# Patient Record
Sex: FEMALE | Race: WHITE | NOT HISPANIC OR LATINO | Employment: UNEMPLOYED | URBAN - METROPOLITAN AREA
[De-identification: names, ages, dates, MRNs, and addresses within clinical notes are randomized per-mention and may not be internally consistent; named-entity substitution may affect disease eponyms.]

---

## 2023-01-10 ENCOUNTER — TRANSFERRED RECORDS (OUTPATIENT)
Dept: HEALTH INFORMATION MANAGEMENT | Facility: CLINIC | Age: 53
End: 2023-01-10
Payer: COMMERCIAL

## 2023-01-20 ENCOUNTER — PATIENT OUTREACH (OUTPATIENT)
Dept: ONCOLOGY | Facility: CLINIC | Age: 53
End: 2023-01-20
Payer: COMMERCIAL

## 2023-01-20 ENCOUNTER — TRANSCRIBE ORDERS (OUTPATIENT)
Dept: OTHER | Age: 53
End: 2023-01-20

## 2023-01-20 DIAGNOSIS — C50.919 INVASIVE DUCTAL CARCINOMA OF BREAST (H): Primary | ICD-10-CM

## 2023-01-23 ENCOUNTER — PRE VISIT (OUTPATIENT)
Dept: ONCOLOGY | Facility: CLINIC | Age: 53
End: 2023-01-23
Payer: COMMERCIAL

## 2023-01-23 NOTE — PROGRESS NOTES
New Patient Oncology Nurse Navigator Note     Referring provider: Self     Referred to (specialty:) Medical Oncology and Cancer Surgery     Requested provider (if applicable): Dr. Marc Judd or Dr. Maria G Mckeon     Date Referral Received: January 23, 2023     Evaluation for:  Breast cancer     Clinical History (per Nurse review of records provided):       Patient had bilateral screening mammograms on 12/27/22 and indeterminate grouped versus clustered fine microcalcifications at the 2:00 position of the left breast, middle depth. Recommend correlation with a mediolateral view and spot magnification CC and MLO views of the left breast for further evaluation. Diagnostic imaging followed on 12/30 and clustered microcalcifications in the upper outer aspect of the left breast are suspicious of malignancy. Recommend stereotactically guided biopsy.     1/10/23 - Left breast, stereotactic needle core biopsy at 2:00 at [facility]   Invasive ductal carcinoma, only microinvasion present (not graded) , DCIS, Grade 3, ER+ (weak) in DCIS, GA not performed, HER2 not performed    1/19/23 - She did meet with Karley Garcia -  CONSULT NOTE NEEDED    Genetic counseling consult scheduled for 1/25 at [facility] - with Trang Chacko MS, [facility]  GENETIC TESTING RESULTS NEEDED WHEN AVAILABLE    Records Location: Care Everywhere, Media and See Bookmarked material     Records Needed:   Breast imaging for past 5 years  Pathology report from 1/10/23  Consult notes from Dr. Karley Garcia 1/19/23  Genetic counseling notes (scheduled for 1/25/23)     Additional testing needed prior to consult:   Possible breast MRI ordered at [facility]    Telephoned and spoke with Philippe to assist in scheduling consult. Both of her parents are nurses and they recommended Dr. Judd or Dr. Mckeon.  Philippe had appropriate questions regarding sequence of events in early stage cancer workup. We were in process of discussing  these when she received another call from [facility] (schedulers) and we agreed to discuss when she called back. Writer did leave voice message with my number inviting calls.

## 2023-01-23 NOTE — PROGRESS NOTES
MEDICAL ONCOLOGY NEW PATIENT NOTE    Philippe Tom  Female, 52 year old, 1970  MRN:   [id number]    January 26, 2023     Dear Dr. Diggs,    Thank you for referring Philippe Tom regarding a newly diagnosed DCIS with a microinvasive component.    PROBLEM: New diagnosis of left sided DCIS with invasive component    HISTORY OF PRESENT ILLNESS: Ms. Tom is a 52 year old woman from [place], [place] with a new diagnosis of left breast Nuclear Grade 3 ER+(15-20%) DCIS with less than 1 mm of invasive disease as part of workup following routine screening mammogram that detected calcifications. She has not undergone surgical management as of yet, and is establishing care at the [facility] with Medical Oncology, and will be seeing Dr. Noamn Diggs for surgical consultation on 1/27/2023.     Her oncologic diagnosis came to light after routine screening mammogram on 12/27/2022 demonstrated indeterminate grouped versus clustered fine microcalcifcations at the 2 o'clock position of the left breast at mid depth. Of note, she does have a history of previous breast biopsies in the right breast on 12/10/2015 that detected fibroadenomas at the 8 o'clock position, 5 cm from the nipple measuring 11 x 6 x 10 mm, and at the 10 o'clock position, 7 cm from the nipple measuring 8 x 4 x 7 mm. Within the 8 o'clock position, rare calcifications were noted adjacent to the fibroadenoma. She also had a diagnostic mammogram for a palpable lump in the left breast on 5/21/2019 at the 2 o'clock position. Focused left breast ultrasound demonstrated an anechoic cyst at the 2 o'clock psition, 8 cm from the nipple measuring 1.7 x 1.2 x 1.5 cm, noted to be benign. An additional anechoic cyst was noted at 1:30 position, 7 cm from the nipple measurign up to 8 mm and routine yearly mammography was recommended.    She underwent stereotactic guided biopsy at [facility] on 1/10/2023 with pathology (Specimen ID  [id number]) demonstrating Nuclear Grade 3 ER+ (11-20%, weak) DCIS with central comedonecrosis, with less than 1 mm of invasive disease noted within the specimen.     She was seen by Dr. Kelly Garcia for surgical consultation on 1/19/2023 who discussed surgical options with the patient. Note unavailable in care everywhere    She saw Medical Genetics at [facility] on 1/25/2023, seeing Sasha Chacko. [facility]'s STAT breast panel was sent.     SUMMARY OF OUTSIDE STUDIES:  Patient had bilateral screening mammograms on 12/27/22 and indeterminate grouped versus clustered fine microcalcifications at the 2:00 position of the left breast, middle depth. Recommend correlation with a mediolateral view and spot magnification CC and MLO views of the left breast for further evaluation. Diagnostic imaging followed on 12/30 and clustered microcalcifications in the upper outer aspect of the left breast are suspicious of malignancy. Recommend stereotactically guided biopsy.     1/10/23 - Left breast, stereotactic needle core biopsy at 2:00:  Biopsy at [facility] - pathology report needed.   Left breast, needle core biopsy at 2:00: Ductal carcinoma in situ with   calcifications and at least microinvasion.    Patient states this was left invasive ductal carcinoma,  ER+    <1mm of invasive -- micro  1/19/23 - She did meet with Karley Garcia - consult note needed  Genetic counseling consult scheduled for 1/25 at [facility] - consult note needed.    RECENT HISTORY:  Ms. Tom is seen today with her , Esau Medellin, and her sister. She notes that she has been feeling some twinges in the left breast, intermittent, and self-remitting. She describes this as similar to the sensation of milk letting down. She notes anxiety and depression, for which she is seeing a counselor. She notes ongoing fatigue. She is working full time as a  at [employer]. She denies pain, weight  loss, numbness, tingling or weakness in the arms or legs. Regarding surgical management, the patient states that she has not made a decision regarding lumpectomy versus mastectomy yet, as she is hopeful to have genetics prior to making this decision.    Breast Cancer Risk Factors:   Breast biopsies: 2 breast biopsies on the right breast that demonstrated fibroadenomas  OCPs: duration of 10 years in college  Menarche: around 7th grade, estimated at 15 years old  Gestational history: 3 pregnancies, 2 live births at 30 years and 32 years, 1  at 20 years  Currently using Mirena IUD for significant bleeding from fibroids. Estimated LMP is 2023  No first degree relatives with breast or ovarian cancer.  She does have a family history of a maternal great aunt and maternal first cousin with breast cancer.    Menstrual history: Menarche age 15.  First pregnancy age 20.  Ended in .  Pregnancies age 30 and 32 with live births.  She has fibroids has a Mirena IUD with light periods.  She did have a history of heavy menstrual bleeding related to the fibroids, before the Mirena IUD was placed.  No history of hormone replacement therapy.    PMH:   Attention deficit disorder  Allergies  Migraines  Fibroids  The patient does not have a history of low bone density, diabetes mellitus, hyperlipidemia, or hypertension    She has no history of breast surgery, breast cancer in the past, radiation therapy in the past, radiation exposure in the past, tumor of any kind, heart problems, heart attack, breathing problems, blood clots, seizures, arthritis, peptic ulcer disease, osteoporosis, bone fractures.  She is not currently participating in a clinical trial.    PSH:  Intrauterine device insertion    MEDICATIONS:  Cetirizine  Cyclobenzaprine  Fremanezumab-vfrm (Ajovy)  Methylphenidate  Sumatriptan  Tretinoin  Adderall  Rimegepant  Epinephrine 0.3 mg/0.3 mL    ALLERGY:  Bee  "pollen  Walnuts  Doxycycline  Sulfa  Wasps       Dog hair  - She follows with Dr. Rufus Cardenas at [facility]    FAMILY HISTORY:  Mother with skin cancer on the leg at 65 years  Father with prostate cancer at 62 years old, currently alive, no recurrence [id number]. Menard 3+4.  Paternal grandfather with prostate cancer, passed at 78 years  Maternal aunt with breast cancer at 70 years old  Maternal first cousin with breast cancer  Maternal grandfather with non-Hodgkin's lymphoma at 82 years  Maternal uncle with colon cancer at 70 years  No male relatives with breast cancer.      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 The last noted menstrual cycle completed on January 22, 2023.     REVIEW OF SYMPTOMS: She has no breast complaints and specifically no breast lumps, skin changes nipple changes or dimpling.  She denies fevers or chills, cough, chest pain, shortness of breath, nausea, vomiting, diarrhea, bone pain, back pain, headache, numbness or tingling in the hands or feet, hearing loss, or depression.  A full 10-point review of systems was performed as per nursing note.  Pertinent negatives and positives reviewed.       PHYSICAL EXAMINATION:    /74 (BP Location: Right arm, Patient Position: Sitting, Cuff Size: Adult Regular)   Pulse 84   Temp 98.8  F (37.1  C) (Oral)   Ht 1.65 m (5' 4.96\")   Wt 74.3 kg (163 lb 14.4 oz)   SpO2 100%   BMI 27.31 kg/m    Gen: Alert, " well-appearing 52 year old woman in no acute distress  Eyes: EOMI, sclera anicteric  HENT      Head: NC/AT     Ears: No external auricular lesions     Nose/sinus: No rhinorrhea or epistaxis     Oral Cavity/Oropharynx: MMM, no thrush noted  Pulm: Breathing comfortably on room air, no audible wheezes or ronchi  CV: Well-perfused, no cyanosis  Abdominal: Soft, nondistended  Skin: Normal color and turgor  Neurologic/MSK: motor grossly intact, normal gait  Lymph: No supraclavicular, infraclavicular, or axillary adenopathy noted bilaterally  Breast: Both breasts are examined in the seated and supine positions. The breasts and nipples appear everted and symmetric. There are no palpable masses noted in the bilateral breasts. No cervical, supraclavicular, subclavicular, or axillary adenopathy noted bilaterally.  Left breast biopsy site in left breast well healed.   Psych: Appropriate mood and affect    ASSESSMENT AND PLAN:   1.  Philippe Tom is a 52 year old pre-menopausal woman with a recent diagnosis of a left breast  nuclear grade 3 DCIS with a microinvasive component < 1 mm diagnosed from a stereotactic biopsy after routine screening mammogram revealed calcifications. ECOG 0. She has not undergone surgical management, and will meet with Dr. Diggs tomorrow to discuss surgical options. Given that we do not have the full pathology, as will be delineated after surgical management, we discussed in general what options would be. We also discussed OncotypeDx would only be sent if her invasive component of disease was 5 mm or greater.   2.  Grade 3 DCIS with invasive component  - Awaiting surgical management. She meets with Dr. Noman Diggs on 1/27/2023. We will plan on seeing the patient after she has had surgery regarding management. Given her age, she will likely require adjuvant radiation therapy and endocrine therapy with tamoxifen for 5 years. Genetics panel is pending.   - The patient asked many good questions regarding  possible management strategies; these are all dependent on her final pathology.  Other calcifications remain to be biopsied. Contrast mammogram may be helpful.  - We will plan on her returning to clinic following completion of staging and pathology review.  - Review in Multidisciplinary Breast Conference tomorrow, 1/27/2023  3. Menorrhagia secondary to fibroids  - Currently using a Mirena IUD for menorrhagia  We do recommend discontinuation of the Mirena IUD in consultation with her gynecologist because of the association between levonorgestrel and breast cancer risk in epidemiological studies.   4.  Recommendations of exercise of 150 minutes/week based on the lace and pathway study.  5. Recommendation of a Mediterranean style diet low in saturated fat but not low in fat with more fruits and vegetables and less red meat.   6.  Recommendation for bone health includes vitamin D3 2000 international units daily and calcium 1 g/day either by diet or reading labels.  We also recommend bone strengthening exercises including stretch band hand weights and yoga.  7. Fatigue / Anxiety  - She currently does have a counselor. Not currently using medication towards this.  8. COVID-19 vaccination  - She has had her last (4th) booster in November 2022.   10.  Migraines, associated with menses  - Using sumatriptan  11.  Follow up with me in 2 weeks to go over the results of imaging and pathology.     Thank you for allowing us to participate in this patient's care.  The patient was seen and evaluated by me.  I discussed the patient with the resident and agree with the findings and plan in the note, which was edited by me.    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 Judd, who agrees with the above assessment and plan.      Tigist Rolon MD  PGY4  Department of Radiation Oncology  [phone] Clinic  [phone] Pager    ADDENDUM: Discussion in breast conference resulted in recommendation of a contrast mammogram, a stereo tactic biopsy of additional calcifications which have not been biopsied and consultation with Dr. Noman Diggs.     ADDENDUM2:  I called on 2-5-23 and recommended that she call us about removal of the Mirena. Pathology is pending from 1-30-23 biopsy.      ADDENDUM3:  NF1 mosaic.  We would be interested in the variant allele frequency, which is something the patient's genetic counselor would be able to get from [employer].    I spent 60 minutes with the patient more than 50% of which was in counseling and coordination of care.

## 2023-01-23 NOTE — TELEPHONE ENCOUNTER
RECORDS STATUS - BREAST    RECORDS REQUESTED FROM: [facility]   DATE REQUESTED:    NOTES DETAILS STATUS   OFFICE NOTE from referring provider     OFFICE NOTE from medical oncologist     OFFICE NOTE from surgeon     OFFICE NOTE from radiation oncologist     DISCHARGE SUMMARY from hospital     DISCHARGE REPORT from the ER     OPERATIVE REPORT     MEDICATION LIST     CLINICAL TRIAL TREATMENTS TO DATE     LABS     PATHOLOGY REPORTS  (Tissue diagnosis, Stage, ER/NJ percentage positive and intensity of staining, HER2 IHC, FISH, and all biopsies from breast and any distant metastasis)                 Lab [facility], report received , slides requested  [employer] 1/10/23:    GENONOMIC TESTING     TYPE:   (Next Generation Sequencing, including Foundation One testing, and Oncotype score)     IMAGING (NEED IMAGES & REPORT)     CT SCANS     MRI     [facility]  1/10/23, 22    [facility]  22, 21      19, 12/10/15, 14, 13, 11   ULTRASOUND PACS   19, 12/17/15, 12/16/15, 13, 11   PET     BONE SCAN     BRAIN MRI

## 2023-01-25 ENCOUNTER — TRANSFERRED RECORDS (OUTPATIENT)
Dept: HEALTH INFORMATION MANAGEMENT | Facility: CLINIC | Age: 53
End: 2023-01-25

## 2023-01-26 ENCOUNTER — ONCOLOGY VISIT (OUTPATIENT)
Dept: ONCOLOGY | Facility: CLINIC | Age: 53
End: 2023-01-26
Attending: INTERNAL MEDICINE
Payer: COMMERCIAL

## 2023-01-26 VITALS
BODY MASS INDEX: 27.31 KG/M2 | WEIGHT: 163.9 LBS | OXYGEN SATURATION: 100 % | HEART RATE: 84 BPM | SYSTOLIC BLOOD PRESSURE: 130 MMHG | HEIGHT: 65 IN | TEMPERATURE: 98.8 F | DIASTOLIC BLOOD PRESSURE: 74 MMHG

## 2023-01-26 DIAGNOSIS — Z17.0 MALIGNANT NEOPLASM OF UPPER-OUTER QUADRANT OF LEFT BREAST IN FEMALE, ESTROGEN RECEPTOR POSITIVE (H): Primary | ICD-10-CM

## 2023-01-26 DIAGNOSIS — C50.412 MALIGNANT NEOPLASM OF UPPER-OUTER QUADRANT OF LEFT BREAST IN FEMALE, ESTROGEN RECEPTOR POSITIVE (H): Primary | ICD-10-CM

## 2023-01-26 DIAGNOSIS — C50.919 INVASIVE DUCTAL CARCINOMA OF BREAST (H): ICD-10-CM

## 2023-01-26 PROCEDURE — G0463 HOSPITAL OUTPT CLINIC VISIT: HCPCS

## 2023-01-26 PROCEDURE — 99205 OFFICE O/P NEW HI 60 MIN: CPT | Performed by: INTERNAL MEDICINE

## 2023-01-26 PROCEDURE — G0463 HOSPITAL OUTPT CLINIC VISIT: HCPCS | Performed by: INTERNAL MEDICINE

## 2023-01-26 RX ORDER — AMMONIUM LACTATE 12 G/100G
1 CREAM TOPICAL
COMMUNITY

## 2023-01-26 RX ORDER — ZOLMITRIPTAN 5 MG/1
5 TABLET, ORALLY DISINTEGRATING ORAL PRN
COMMUNITY
Start: 2023-01-20

## 2023-01-26 RX ORDER — ACETAMINOPHEN 325 MG/1
TABLET, COATED ORAL
COMMUNITY
Start: 2022-08-03 | End: 2023-01-27

## 2023-01-26 RX ORDER — METHYLPHENIDATE HYDROCHLORIDE 36 MG/1
36 TABLET ORAL
COMMUNITY
Start: 2022-12-07 | End: 2023-01-27

## 2023-01-26 RX ORDER — SUMATRIPTAN 20 MG/1
1 SPRAY NASAL PRN
COMMUNITY
Start: 2022-10-23

## 2023-01-26 RX ORDER — CYCLOBENZAPRINE HCL 10 MG
1 TABLET ORAL EVERY EVENING
COMMUNITY
Start: 2022-10-21

## 2023-01-26 RX ORDER — FREMANEZUMAB-VFRM 225 MG/1.5ML
INJECTION SUBCUTANEOUS
COMMUNITY
Start: 2022-10-16 | End: 2023-11-07

## 2023-01-26 RX ORDER — TRETINOIN 0.25 MG/G
CREAM TOPICAL AT BEDTIME
COMMUNITY
Start: 2021-08-21

## 2023-01-26 RX ORDER — SUMATRIPTAN 100 MG/1
2 TABLET, FILM COATED ORAL
COMMUNITY
Start: 2022-10-13

## 2023-01-26 RX ORDER — METHYLPHENIDATE HYDROCHLORIDE 20 MG/1
20 TABLET ORAL EVERY MORNING
COMMUNITY
Start: 2021-11-10

## 2023-01-26 RX ORDER — CETIRIZINE HYDROCHLORIDE 10 MG/1
1 TABLET ORAL EVERY EVENING
COMMUNITY
Start: 2022-08-03 | End: 2023-03-23

## 2023-01-26 ASSESSMENT — PAIN SCALES - GENERAL: PAINLEVEL: NO PAIN (0)

## 2023-01-26 NOTE — NURSING NOTE
"Oncology Rooming Note    January 26, 2023 10:14 AM   Philippe Tom is a 52 year old female who presents for:    Chief Complaint   Patient presents with     Oncology Clinic Visit     Malignant neoplasm of upper-outer quadrant of left breast      Initial Vitals: /74 (BP Location: Right arm, Patient Position: Sitting, Cuff Size: Adult Regular)   Pulse 84   Temp 98.8  F (37.1  C) (Oral)   Ht 1.65 m (5' 4.96\")   Wt 74.3 kg (163 lb 14.4 oz)   SpO2 100%   BMI 27.31 kg/m   Estimated body mass index is 27.31 kg/m  as calculated from the following:    Height as of this encounter: 1.65 m (5' 4.96\").    Weight as of this encounter: 74.3 kg (163 lb 14.4 oz). Body surface area is 1.85 meters squared.  No Pain (0) Comment: Data Unavailable   No LMP recorded.  Allergies reviewed: Yes  Medications reviewed: Yes    Medications: Medication refills not needed today.  Pharmacy name entered into [facility]:    [id number] [place] [address]  [facility] [place]    Clinical concerns: none.       Maurilio Rivera            "

## 2023-01-26 NOTE — LETTER
1/26/2023         RE: Philippe Tom  [address]  [facility] [address]        Dear Colleague,    Thank you for referring your patient, Philippe Tom, to the [facility]. Please see a copy of my visit note below.    MEDICAL ONCOLOGY NEW PATIENT NOTE    Philippe Tom  Female, 52 year old, 1970  MRN:   [id number]    January 26, 2023     Dear Dr. Diggs,    Thank you for referring Philippe Tom regarding a newly diagnosed DCIS with a microinvasive component.    PROBLEM: New diagnosis of left sided DCIS with invasive component    HISTORY OF PRESENT ILLNESS: Ms. Tom is a 52 year old woman from [place], [place] with a new diagnosis of Nuclear Grade 3 ER+(15-20%) DCIS with less than 1 mm of invasive disease as part of workup following routine screening mammogram that detected calcifications. She has not undergone surgical management as of yet, and is establishing care at the [facility] with Medical Oncology, and will be seeing Dr. Noman Diggs for surgical consultation on 1/27/2023.     Her oncologic diagnosis came to light after routine screening mammogram on 12/27/2022 demonstrated indeterminate grouped versus clustered fine microcalcifcations at the 2 o'clock position of the left breast at mid depth. Of note, she does have a history of previous breast biopsies in the right breast on 12/10/2015 that detected fibroadenomas at the 8 o'clock position, 5 cm from the nipple measuring 11 x 6 x 10 mm, and at the 10 o'clock position, 7 cm from the nipple measuring 8 x 4 x 7 mm. Within the 8 o'clock position, rare calcifications were noted adjacent to the fibroadenoma. She also had a diagnostic mammogram for a palpable lump in the left breast on 5/21/2019 at the 2 o'clock position. Focused left breast ultrasound demonstrated an anechoic cyst at the 2 o'clock psition, 8 cm from the nipple measuring 1.7 x 1.2 x 1.5 cm, noted to be benign. An additional anechoic  cyst was noted at 1:30 position, 7 cm from the nipple measurign up to 8 mm and routine yearly mammography was recommended.    She underwent stereotactic guided biopsy at [facility] on 1/10/2023 with pathology (Specimen -[id number]) demonstrating Nuclear Grade 3 ER+ (11-20%, weak) DCIS with central comedonecrosis, with less than 1 mm of invasive disease noted within the specimen.     She was seen by Dr. Kelly Garcia for surgical consultation on 1/19/2023 who discussed surgical options with the patient. Note unavailable in care everywhere    She saw Medical Genetics at [facility] on 1/25/2023, seeing Sasha Chacko. [email or web address]'s STAT breast panel was sent.     SUMMARY OF OUTSIDE STUDIES:  Patient had bilateral screening mammograms on 12/27/22 and indeterminate grouped versus clustered fine microcalcifications at the 2:00 position of the left breast, middle depth. Recommend correlation with a mediolateral view and spot magnification CC and MLO views of the left breast for further evaluation. Diagnostic imaging followed on 12/30 and clustered microcalcifications in the upper outer aspect of the left breast are suspicious of malignancy. Recommend stereotactically guided biopsy.     1/10/23 - Left breast, stereotactic needle core biopsy at 2:00:  Biopsy at [facility] - pathology report needed.   Left breast, needle core biopsy at 2:00: Ductal carcinoma in situ with   calcifications and at least microinvasion.    Patient states this was left invasive ductal carcinoma,  ER+    <1mm of invasive -- micro  1/19/23 - She did meet with Karley Garcia - consult note needed  Genetic counseling consult scheduled for 1/25 at [facility] - consult note needed.    RECENT HISTORY:  Ms. Tom is seen today with her , Esau Medellin, and her sister. She notes that she has been feeling some twinges in the left breast, intermittent, and self-remitting. She describes this as  similar to the sensation of milk letting down. She notes anxiety and depression, for which she is seeing a counselor. She notes ongoing fatigue. She is working full time as a  at [employer]. She denies pain, weight loss, numbness, tingling or weakness in the arms or legs. Regarding surgical management, the patient states that she has not made a decision regarding lumpectomy versus mastectomy yet, as she is hopeful to have genetics prior to making this decision.    Breast Cancer Risk Factors:   Breast biopsies: 2 breast biopsies on the right breast that demonstrated fibroadenomas  OCPs: duration of 10 years in college  Menarche: around 7th grade, estimated at 15 years old  Gestational history: 3 pregnancies, 2 live births at 30 years and 32 years, 1  at 20 years  Currently using Mirena IUD for significant bleeding from fibroids. Estimated LMP is 2023  No first degree relatives with breast or ovarian cancer.  She does have a family history of a maternal great aunt and maternal first cousin with breast cancer.    Menstrual history: Menarche age 15.  First pregnancy age 20.  Ended in .  Pregnancies age 30 and 32 with live births.  She has fibroids has a Mirena IUD with light periods.  She did have a history of heavy menstrual bleeding related to the fibroids, before the Mirena IUD was placed.  No history of hormone replacement therapy.    PMH:   Attention deficit disorder  Allergies  Migraines  Fibroids  The patient does not have a history of low bone density, diabetes mellitus, hyperlipidemia, or hypertension    She has no history of breast surgery, breast cancer in the past, radiation therapy in the past, radiation exposure in the past, tumor of any kind, heart problems, heart attack, breathing problems, blood clots, seizures, arthritis, peptic ulcer disease, osteoporosis, bone fractures.  She is not currently participating in a clinical trial.    PSH:  Intrauterine  device insertion    MEDICATIONS:  Cetirizine  Cyclobenzaprine  Fremanezumab-vfrm (Ajovy)  Methylphenidate  Sumatriptan  Tretinoin  Adderall  Rimegepant  Epinephrine 0.3 mg/0.3 mL    ALLERGY:  Bee pollen  Walnuts  Doxycycline  Sulfa  Wasps       Dog hair  - She follows with Dr. Rufus Cardenas at [facility]    FAMILY HISTORY:  Mother with skin cancer on the leg at 65 years  Father with prostate cancer at 62 years old, currently alive, no recurrence  Paternal grandfather with prostate cancer, passed at 78 years  Maternal aunt with breast cancer at 70 years old  Maternal first cousin with breast cancer  Maternal grandfather with non-Hodgkin's lymphoma at 82 years  Maternal uncle with colon cancer at 70 years  No male relatives with breast cancer.      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 The last noted menstrual cycle completed on January 22, 2023.     REVIEW OF SYMPTOMS: She has no breast complaints and specifically no breast lumps, skin changes nipple changes or dimpling.  She denies fevers or chills, cough, chest pain, shortness of breath, nausea, vomiting, diarrhea, bone pain, back pain, headache, numbness or tingling in the hands or feet, hearing loss, or depression.  A full 10-point review of systems was performed as per nursing note.  Pertinent negatives and positives reviewed.       PHYSICAL EXAMINATION:    /74 (BP Location: Right arm, Patient Position: Sitting,  "Cuff Size: Adult Regular)   Pulse 84   Temp 98.8  F (37.1  C) (Oral)   Ht 1.65 m (5' 4.96\")   Wt 74.3 kg (163 lb 14.4 oz)   SpO2 100%   BMI 27.31 kg/m    Gen: Alert, well-appearing 52 year old woman in no acute distress  Eyes: EOMI, sclera anicteric  HENT      Head: NC/AT     Ears: No external auricular lesions     Nose/sinus: No rhinorrhea or epistaxis     Oral Cavity/Oropharynx: MMM, no thrush noted  Pulm: Breathing comfortably on room air, no audible wheezes or ronchi  CV: Well-perfused, no cyanosis  Abdominal: Soft, nondistended  Skin: Normal color and turgor  Neurologic/MSK: motor grossly intact, normal gait  Lymph: No supraclavicular, infraclavicular, or axillary adenopathy noted bilaterally  Breast: Both breasts are examined in the seated and supine positions. The breasts and nipples appear everted and symmetric. There are no palpable masses noted in the bilateral breasts. No cervical, supraclavicular, subclavicular, or axillary adenopathy noted bilaterally.  Psych: Appropriate mood and affect    ASSESSMENT AND PLAN:   1.  Philippe Tom is a 52 year old pre-menopausal woman with a recent diagnosis of nuclear grade 3 DCIS with a microinvasive component < 1 mm diagnosed from a stereotactic biopsy after routine screening mammogram revealed calcifications. ECOG 0. She has not undergone surgical management, and will meet with Dr. Diggs tomorrow to discuss surgical options. Given that we do not have the full pathology, as will be delineated after surgical management, we discussed in general what options would be. We also discussed OncotypeDx would only be sent if her invasive component of disease was 5 mm or greater.   2.  Grade 3 DCIS with invasive component  - Awaiting surgical management. She meets with Dr. Noman Diggs on 1/27/2023. We will plan on seeing the patient after she has had surgery regarding management. Given her age, she will likely require adjuvant radiation therapy and endocrine therapy with " tamoxifen for 5 years. Genetics panel is pending.   - The patient asked many good questions regarding possible management strategies; these are all dependent on her final pathology.  Other calcifications remain to be biopsied. Contrast mammogram may be helpful.  - We will plan on her returning to clinic following completion of staging and pathology review.  - Review in Multidisciplinary Breast Conference tomorrow, 1/27/2023  3. Menorrhagia secondary to fibroids  - Currently using a Mirena IUD for menorrhagia  We do recommend discontinuation of the Mirena IUD in consultation with her gynecologist because of the association between levonorgestrel and breast cancer risk in epidemiological studies.   4.  Recommendations of exercise of 150 minutes/week based on the lace and pathway study.  5. Recommendation of a Mediterranean style diet low in saturated fat but not low in fat with more fruits and vegetables and less red meat.   6.  Recommendation for bone health includes vitamin D3 2000 international units daily and calcium 1 g/day either by diet or reading labels.  We also recommend bone strengthening exercises including stretch band hand weights and yoga.  7. Fatigue / Anxiety  - She currently does have a counselor. Not currently using medication towards this.  8. COVID-19 vaccination  - She has had her last (4th) booster in November 2022.   10.  Migraines, associated with menses  - Using sumatriptan  11.  Follow up with me in 2 weeks to go over the results of imaging and pathology.     Thank you for allowing us to participate in this patient's care.  The patient was seen and evaluated by me.  I discussed the patient with the resident and agree with the findings and plan in the note, which was edited by me.    Sincerely,     Marc Judd MD  Professor  [facility]  [phone]      The patient was seen and assessed with staff, Dr. Judd, who agrees with the above assessment and plan.      Tigist  MD Ольга PGY4  Department of Radiation Oncology  [phone] Clinic  [phone] Pager    ADDENDUM: Discussion in breast conference resulted in recommendation of a contrast mammogram, a stereo tactic biopsy of additional calcifications which have not been biopsied and consultation with Dr. Noman Diggs.    I spent 60 minutes with the patient more than 50% of which was in counseling and coordination of care.

## 2023-01-27 ENCOUNTER — TUMOR CONFERENCE (OUTPATIENT)
Dept: ONCOLOGY | Facility: CLINIC | Age: 53
End: 2023-01-27
Payer: COMMERCIAL

## 2023-01-27 ENCOUNTER — ONCOLOGY VISIT (OUTPATIENT)
Dept: ONCOLOGY | Facility: CLINIC | Age: 53
End: 2023-01-27
Attending: SURGERY
Payer: COMMERCIAL

## 2023-01-27 ENCOUNTER — APPOINTMENT (OUTPATIENT)
Dept: ONCOLOGY | Facility: CLINIC | Age: 53
End: 2023-01-27
Attending: INTERNAL MEDICINE
Payer: COMMERCIAL

## 2023-01-27 VITALS
WEIGHT: 163.8 LBS | HEART RATE: 93 BPM | DIASTOLIC BLOOD PRESSURE: 88 MMHG | TEMPERATURE: 98.3 F | SYSTOLIC BLOOD PRESSURE: 125 MMHG | HEIGHT: 65 IN | RESPIRATION RATE: 16 BRPM | BODY MASS INDEX: 27.29 KG/M2 | OXYGEN SATURATION: 99 %

## 2023-01-27 DIAGNOSIS — Z17.0 MALIGNANT NEOPLASM OF UPPER-OUTER QUADRANT OF LEFT BREAST IN FEMALE, ESTROGEN RECEPTOR POSITIVE (H): Primary | ICD-10-CM

## 2023-01-27 DIAGNOSIS — C50.412 MALIGNANT NEOPLASM OF UPPER-OUTER QUADRANT OF LEFT BREAST IN FEMALE, ESTROGEN RECEPTOR POSITIVE (H): Primary | ICD-10-CM

## 2023-01-27 PROCEDURE — 99205 OFFICE O/P NEW HI 60 MIN: CPT | Performed by: SURGERY

## 2023-01-27 PROCEDURE — G0463 HOSPITAL OUTPT CLINIC VISIT: HCPCS | Performed by: SURGERY

## 2023-01-27 PROCEDURE — G0463 HOSPITAL OUTPT CLINIC VISIT: HCPCS

## 2023-01-27 RX ORDER — EPINEPHRINE 0.3 MG/.3ML
0.3 INJECTION SUBCUTANEOUS PRN
COMMUNITY

## 2023-01-27 RX ORDER — MULTIVIT-MIN/IRON/FOLIC ACID/K 18-600-40
1 CAPSULE ORAL DAILY
COMMUNITY

## 2023-01-27 ASSESSMENT — PAIN SCALES - GENERAL: PAINLEVEL: NO PAIN (0)

## 2023-01-27 NOTE — TELEPHONE ENCOUNTER
Action January 27, 2023 11:19 AM ABT   Action Taken Slides from NM received and taken to 5th floor path lab for review.

## 2023-01-27 NOTE — PROGRESS NOTES
HISTORY OF PRESENT ILLNESS:  Philippe Tom is a 52-year-old woman who presents with a new diagnosis of left ductal carcinoma in situ with microinvasion.  She underwent a screening mammogram, which revealed a subcentimeter area of microcalcifications.  She underwent a diagnostic mammogram, which confirmed that she had underwent a needle biopsy which demonstrated grade 3 DCIS with microinvasion.  The microinvasion was not graded and did not have ER or HER-2 status reported.  In reviewing her mammograms this morning at breast conference, we did notice another area of microcalcifications about 3 cm away from the biopsied lesion.  This area looks similar but was not biopsied.  She has no breast symptoms at the present time.  She did have a history of a fibroadenoma biopsied in the past.  She has seen a surgeon, a medical oncologist and a genetic counselor.  She had her blood drawn on Wednesday for genetic testing.  She is otherwise healthy with no major medical problems.    IMPRESSION:  DCIS with microinvasion.    PLAN:  I talked to her about the various treatment options including mastectomy with or without reconstruction versus a lumpectomy plus radiation therapy.  I told her that there is no difference in overall recurrence rates or survival.  If she is contemplating breast conservation, which she is, I have recommended a contrast-enhanced mammography and potential biopsy of this second area of microcalcifications.  I did discuss this with the breast radiologist this morning.  Additionally, I recommended a sentinel lymph node biopsy.  I told her that decisions regarding chemotherapy and endocrine therapy would not be made until after her surgery when we know the characteristics of her invasive breast cancer.  So presently, we will schedule her for a left seed-localized lumpectomy and sentinel lymph node biopsy.  We will also schedule her for a contrast-enhanced mammography and possible left breast biopsy.  We will  also await the results of her genetic testing results.  I will likely talk to her after those tests so that we can help coordinate her care.    Total time:  60 minutes, which involved reviewing her imaging, in-person visit and coordinating her care.

## 2023-01-27 NOTE — NURSING NOTE
"Oncology Rooming Note    January 27, 2023 10:18 AM   Philippe Tom is a 52 year old female who presents for:    Chief Complaint   Patient presents with     Oncology Clinic Visit     Invasive ductal carcinoma of breast     Initial Vitals: /88   Pulse 93   Temp 98.3  F (36.8  C) (Oral)   Resp 16   Ht 1.65 m (5' 4.96\")   Wt 74.3 kg (163 lb 12.8 oz)   SpO2 99%   BMI 27.29 kg/m   Estimated body mass index is 27.29 kg/m  as calculated from the following:    Height as of this encounter: 1.65 m (5' 4.96\").    Weight as of this encounter: 74.3 kg (163 lb 12.8 oz). Body surface area is 1.85 meters squared.  No Pain (0) Comment: Data Unavailable   No LMP recorded.  Allergies reviewed: Yes  Medications reviewed: Yes    Medications: Medication refills not needed today.  Pharmacy name entered into [employer]:    [id number] [place] [facility] [id number]  [facility] [place] [place]    Clinical concerns: none       Marylin Garrison CMA            "

## 2023-01-27 NOTE — LETTER
1/27/2023         RE: Philippe Tom  [address] [facility] [address]        Dear Colleague,    Thank you for referring your patient, Philippe Tom, to the [facility]. Please see a copy of my visit note below.    HISTORY OF PRESENT ILLNESS:  Philippe Tom is a 52-year-old woman who presents with a new diagnosis of left ductal carcinoma in situ with microinvasion.  She underwent a screening mammogram, which revealed a subcentimeter area of microcalcifications.  She underwent a diagnostic mammogram, which confirmed that she had underwent a needle biopsy which demonstrated grade 3 DCIS with microinvasion.  The microinvasion was not graded and did not have ER or HER-2 status reported.  In reviewing her mammograms this morning at breast conference, we did notice another area of microcalcifications about 3 cm away from the biopsied lesion.  This area looks similar but was not biopsied.  She has no breast symptoms at the present time.  She did have a history of a fibroadenoma biopsied in the past.  She has seen a surgeon, a medical oncologist and a genetic counselor.  She had her blood drawn on Wednesday for genetic testing.  She is otherwise healthy with no major medical problems.    IMPRESSION:  DCIS with microinvasion.    PLAN:  I talked to her about the various treatment options including mastectomy with or without reconstruction versus a lumpectomy plus radiation therapy.  I told her that there is no difference in overall recurrence rates or survival.  If she is contemplating breast conservation, which she is, I have recommended a contrast-enhanced mammography and potential biopsy of this second area of microcalcifications.  I did discuss this with the breast radiologist this morning.  Additionally, I recommended a sentinel lymph node biopsy.  I told her that decisions regarding chemotherapy and endocrine therapy would not be made until after her surgery when we  know the characteristics of her invasive breast cancer.  So presently, we will schedule her for a left seed-localized lumpectomy and sentinel lymph node biopsy.  We will also schedule her for a contrast-enhanced mammography and possible left breast biopsy.  We will also await the results of her genetic testing results.  I will likely talk to her after those tests so that we can help coordinate her care.    Total time:  60 minutes, which involved reviewing her imaging, in-person visit and coordinating her care.      Sincerely,        Noman Diggs MD

## 2023-01-30 ENCOUNTER — ANCILLARY PROCEDURE (OUTPATIENT)
Dept: MAMMOGRAPHY | Facility: CLINIC | Age: 53
End: 2023-01-30
Attending: SURGERY
Payer: COMMERCIAL

## 2023-01-30 ENCOUNTER — TELEPHONE (OUTPATIENT)
Dept: ONCOLOGY | Facility: CLINIC | Age: 53
End: 2023-01-30

## 2023-01-30 DIAGNOSIS — R92.8 ABNORMAL FINDING ON BREAST IMAGING: ICD-10-CM

## 2023-01-30 DIAGNOSIS — D05.10 DCIS (DUCTAL CARCINOMA IN SITU) OF BREAST: ICD-10-CM

## 2023-01-30 PROCEDURE — 77066 DX MAMMO INCL CAD BI: CPT

## 2023-01-30 PROCEDURE — G0279 TOMOSYNTHESIS, MAMMO: HCPCS

## 2023-01-30 PROCEDURE — 99207 MA STEREOTACTIC BREAST BIOPSY VACUUM LT: CPT

## 2023-01-30 RX ORDER — LIDOCAINE HYDROCHLORIDE AND EPINEPHRINE 10; 10 MG/ML; UG/ML
10 INJECTION, SOLUTION INFILTRATION; PERINEURAL ONCE
Status: COMPLETED | OUTPATIENT
Start: 2023-01-30 | End: 2023-01-30

## 2023-01-30 RX ORDER — IOPAMIDOL 755 MG/ML
100 INJECTION, SOLUTION INTRAVASCULAR ONCE
Status: COMPLETED | OUTPATIENT
Start: 2023-01-30 | End: 2023-01-30

## 2023-01-30 RX ADMIN — LIDOCAINE HYDROCHLORIDE AND EPINEPHRINE 10 ML: 10; 10 INJECTION, SOLUTION INFILTRATION; PERINEURAL at 13:39

## 2023-01-30 RX ADMIN — IOPAMIDOL 90 ML: 755 INJECTION, SOLUTION INTRAVASCULAR at 13:14

## 2023-01-30 NOTE — TELEPHONE ENCOUNTER
LA paperwork received via triage from [facility]. Will be placed in provider folder for signature upon completion.     Fax: [phone]      Renard Fermin

## 2023-02-01 ENCOUNTER — TRANSFERRED RECORDS (OUTPATIENT)
Dept: HEALTH INFORMATION MANAGEMENT | Facility: CLINIC | Age: 53
End: 2023-02-01

## 2023-02-02 ENCOUNTER — PATIENT OUTREACH (OUTPATIENT)
Dept: ONCOLOGY | Facility: CLINIC | Age: 53
End: 2023-02-02
Payer: COMMERCIAL

## 2023-02-02 NOTE — PROGRESS NOTES
[facility]: Surgical Oncology Cancer Care Short Note                                     Discussion with Patient:                                                       INBOUND CALL:     Received call from Gus stating enid received her genetics results and they came back negative. Reviewed with Philippe we will move forward with scheduling her surgery and associated appointments.       Encouraged patient to call with any further questions or concerns.     Rebeca Judd, RNCC  [facility]   Surgical Oncology

## 2023-02-05 ENCOUNTER — TELEPHONE (OUTPATIENT)
Dept: ONCOLOGY | Facility: CLINIC | Age: 53
End: 2023-02-05
Payer: COMMERCIAL

## 2023-02-05 NOTE — TELEPHONE ENCOUNTER
I called and left a message on her cell phone to call us this week about removal of the Mirena.     Marc Judd MD

## 2023-02-05 NOTE — TELEPHONE ENCOUNTER
I called Philippe and went over the plan of lumpectomy with Dr. Diggs.     So presently, we will schedule her for a left seed-localized lumpectomy and sentinel lymph node biopsy.  We will also schedule her for a contrast-enhanced mammography and possible left breast biopsy.      No breast biopsy was necessary after review with radiology.  Pathology review pending.     She met with her gynecologist and plans for a hysterectomy and possibly oophorectomy.     All of her questions were answered.     Marc Judd MD

## 2023-02-06 ENCOUNTER — PATIENT OUTREACH (OUTPATIENT)
Dept: ONCOLOGY | Facility: CLINIC | Age: 53
End: 2023-02-06

## 2023-02-06 ENCOUNTER — TELEPHONE (OUTPATIENT)
Dept: ONCOLOGY | Facility: CLINIC | Age: 53
End: 2023-02-06

## 2023-02-06 ENCOUNTER — ANCILLARY PROCEDURE (OUTPATIENT)
Dept: MAMMOGRAPHY | Facility: CLINIC | Age: 53
End: 2023-02-06
Attending: SURGERY
Payer: COMMERCIAL

## 2023-02-06 DIAGNOSIS — D05.12 DUCTAL CARCINOMA IN SITU (DCIS) OF LEFT BREAST: ICD-10-CM

## 2023-02-06 PROCEDURE — 19281 PERQ DEVICE BREAST 1ST IMAG: CPT | Mod: LT

## 2023-02-06 NOTE — TELEPHONE ENCOUNTER
FUTURE VISIT INFORMATION      SURGERY INFORMATION:     02/15/23 Dr. Diggs    Consult: ov 1/27/23    RECORDS REQUESTED FROM:       Primary Care Provider: [facility]

## 2023-02-06 NOTE — PROGRESS NOTES
Called patient and left a VM with Dr Judd's recommendations to have her Mirena removed. Daria Ortega RN, BSN Breast Center Nurse Coordinator

## 2023-02-06 NOTE — TELEPHONE ENCOUNTER
RN Care Coordinator: Rebeca Judd RN     Surgery is scheduled with Dr. Noman Diggs  Date: 2/15/2023   Location: [facility].  Scheduled per next available date    Nuc Med injection scheduled to be delivered to the OR for surgeon to inject.    H&P to be completed by PAC clinic on 2/9     Post-op: 3/3/2023 at 9:00am, in person visit    Patient will receive surgery arrival and start time from PAC.        Left a message for the patient to call to go over all the details waiting for a call back.    The surgery packet was: Not needed per staff note from Rebeca    ______________    Hina Fountain on 2/6/2023 at 1:25 PM

## 2023-02-07 ENCOUNTER — TRANSFERRED RECORDS (OUTPATIENT)
Dept: HEALTH INFORMATION MANAGEMENT | Facility: CLINIC | Age: 53
End: 2023-02-07

## 2023-02-09 ENCOUNTER — ANESTHESIA EVENT (OUTPATIENT)
Dept: SURGERY | Facility: CLINIC | Age: 53
End: 2023-02-09
Payer: COMMERCIAL

## 2023-02-09 ENCOUNTER — PRE VISIT (OUTPATIENT)
Dept: SURGERY | Facility: CLINIC | Age: 53
End: 2023-02-09

## 2023-02-09 ENCOUNTER — VIRTUAL VISIT (OUTPATIENT)
Dept: SURGERY | Facility: CLINIC | Age: 53
End: 2023-02-09
Payer: COMMERCIAL

## 2023-02-09 DIAGNOSIS — Z01.818 PREOP EXAMINATION: Primary | ICD-10-CM

## 2023-02-09 DIAGNOSIS — C50.412 MALIGNANT NEOPLASM OF UPPER-OUTER QUADRANT OF LEFT BREAST IN FEMALE, ESTROGEN RECEPTOR POSITIVE (H): ICD-10-CM

## 2023-02-09 DIAGNOSIS — Z17.0 MALIGNANT NEOPLASM OF UPPER-OUTER QUADRANT OF LEFT BREAST IN FEMALE, ESTROGEN RECEPTOR POSITIVE (H): ICD-10-CM

## 2023-02-09 PROCEDURE — 99203 OFFICE O/P NEW LOW 30 MIN: CPT | Mod: VID | Performed by: PHYSICIAN ASSISTANT

## 2023-02-09 RX ORDER — METHYLPHENIDATE HYDROCHLORIDE 36 MG/1
36 TABLET ORAL 2 TIMES DAILY
COMMUNITY

## 2023-02-09 NOTE — PROGRESS NOTES
Philippe is a 52 year old who is being evaluated via a billable video visit.      How would you like to obtain your AVS? Zaid Bates   Philippe is a 52 year old;  presenting for the following health issues:  Pre-Op Exam (/)      HPI         Review of Systems       Physical Exam       CLAUDY Tidwell LPN

## 2023-02-09 NOTE — PATIENT INSTRUCTIONS
Preparing for Your Surgery      Name:  Philippe Tom   MRN:  [id number]   :  1970   Today's Date:  2023       Arriving for surgery:  Surgery date:  02/15/2023  Arrival time:  6:00 am     Surgeries and procedures: Adult patients can have 2 visitors all through the surgery process.     Visiting hours: 8 a.m. to 8:30 p.m.     Hospital: Adult patients and children under age 18 can have 4 visitor at a time     No visitors under the age of 5 are allowed for hospital patients.  Double occupancy rooms: Patients can have only two visitors at a time.     Patients with disabilities: Can have a support person with them (family member, service provider     Or someone well informed about their needs) plus the allowed number of visitors     Patients confirmed or suspected to have symptoms of COVID 19 or flu:     No visitors allowed for adult patients.   Children (under age 18) can have 1 named visitor.     People who are sick or showing symptoms of COVID 19 or flu:    Are not allowed to visit patients--we can only make exceptions in special situations.       Please follow these guidelines for your visit:   Arrive wearing a mask over your mouth and nose; we will give you a medical mask to wear    If you arrive wearing a cloth mask.   Keep it on during your entire visit, even when in patient's room.   If you don't wear a mask we'll ask you to leave.     Clean your hands with alcohol hand . Do this when you arrive at and leave the building and patient room,    And again after you touch your mask or anything in the room.     You can t visit if you have a fever, cough, shortness of breath, muscle aches, headaches, sore throat    Or diarrhea      Stay 6 feet away from others during your visit and between visits     Go directly to and from the room you are visiting.     Stay in the patient s room during your visit. Limit going to other places in the hospital as much as possible     Leave bags and jackets at home or  in the car.     For everyone s health, please don t come and go during your visit. That includes for smoking   during your visit.     Please come to:     [facility] [address]  [address] [place]  [address]    - ? parking is available in front of the hospital      -    Please proceed to [address] on the 3rd floor. [phone]?     - ?If you are in need of directions, wheelchair or escort please stop at the Information Desk in the lobby.        What can I eat or drink?  -  You may eat and drink normally up to 8 hours prior to arrival time. (Until Midnight)  -  You may have clear liquids until 2 hours prior to arrival time. (Until 4 am)    Examples of clear liquids:  Water  Clear broth  Juices (apple, white grape, white cranberry  and cider) without pulp  Noncarbonated, powder based beverages  (lemonade and Evan-Aid)  Sodas (Sprite, 7-Up, ginger ale and seltzer)  Coffee or tea (without milk or cream)  Gatorade    -  No Alcohol for at least 24 hours before surgery.     Which medicines can I take?    Hold Aspirin for 7 days before surgery.   Hold Multivitamins for 7 days before surgery.  Hold Supplements for 7 days before surgery. (Turkey Tail )  Hold Ibuprofen (Advil, Motrin) for 1 day before surgery--unless otherwise directed by surgeon.  Hold Naproxen (Aleve) for 4 days before surgery.    Hold Zolmitriptan and sumatriptan for 24 hours before surgery        -  DO NOT take these medications the day of surgery:  Vitamin C  Methylphenidate (ritalin)  Sudafed  Vitamin D      -  PLEASE TAKE these medications the day of surgery:  Eye drops per your routine  Inhaler per your routine     How do I prepare myself?  - Please take 2 showers before surgery using Scrubcare or Hibiclens soap.    Use this soap only from the neck to your toes.     Leave the soap on your skin for one minute--then rinse thoroughly.      You may use your own shampoo and conditioner.  No other hair products.   - Please remove all jewelry and body piercings.  - No lotions, deodorants or fragrance.  - No makeup or fingernail polish.   - Bring your ID and insurance card.    -If you have a Deep Brain Stimulator, Spinal Cord Stimulator, or any Neuro Stimulator device---you must bring the remote control to the hospital.      ALL PATIENTS GOING HOME THE SAME DAY OF SURGERY ARE REQUIRED TO HAVE A RESPONSIBLE ADULT TO DRIVE AND BE IN ATTENDANCE WITH THEM FOR 24 HOURS FOLLOWING SURGERY.    Covid testing policy as of 12/06/2022  Your surgeon will notify and schedule you for a COVID test if one is needed before surgery--please direct any questions or COVID symptoms to your surgeon      Questions or Concerns:    - For any questions regarding the day of surgery or your hospital stay, please contact the Pre Admission Nursing Office at [phone].       - If you have health changes between today and your surgery, please call your surgeon.       - For questions after surgery, please call your surgeons office.

## 2023-02-09 NOTE — H&P
Pre-Operative H & P     CC:  Preoperative exam to assess for increased cardiopulmonary risk while undergoing surgery and anesthesia.    Date of Encounter: 2/9/2023  Primary Care Physician:  Hiwot Lr     Reason for visit:   Encounter Diagnoses   Name Primary?     Preop examination Yes     Malignant neoplasm of upper-outer quadrant of left breast in female, estrogen receptor positive (H)        HPI  Philippe Tom is a 52 year old female who presents for pre-operative H & P in preparation for  Procedure Information     Case: [id number] Date/Time: 02/15/23 0915    Procedures:       LUMPECTOMY, BREAST, LOCALIZED USING RADIOFREQUENCY IDENTIFICATION (Left: Breast)      BIOPSY, LYMPH NODE, SENTINEL (Left: Axilla)    Anesthesia type: General    Diagnosis: Malignant neoplasm of upper-outer quadrant of left breast in female, estrogen receptor positive (H) [C50.412, Z17.0]    Pre-op diagnosis: Malignant neoplasm of upper-outer quadrant of left breast in female, estrogen receptor positive (H) [C50.412, Z17.0]    Location:  OR  /  OR    Providers: Noman Diggs MD          Philippe Tom is a 52 year old female with PMH significant for asthma, ADD, migraine headaches and menorrhagia for which she had an IUD placed.  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  She underwent a screening mammogram, which revealed a subcentimeter area of microcalcifications.  She underwent a diagnostic mammogram, which confirmed that she had underwent a needle biopsy which demonstrated grade 3 DCIS with microinvasion. The above procedure is now planned.    She asks today about her thyroid and wonders if she has thyroid issues.  She states she has gained 13 lbs and can't get rid of it.  She reports a strong FH of thyroid disorder.    History is obtained from the patient and chart review    Hx of abnormal bleeding or anti-platelet use: denies    Menstrual history: Patient's last menstrual period was  02/07/2023.:      Past Medical History  Past Medical History:   Diagnosis Date     ADD (attention deficit disorder)     diagnosed 1998      Allergies     had allergy testing as a child     Asthma      Migraine        Past Surgical History  Past Surgical History:   Procedure Laterality Date     DENTAL SURGERY       INSERT INTRAUTERINE DEVICE         Prior to Admission Medications  Current Outpatient Medications   Medication Sig Dispense Refill     Bioflavonoid Products (VITAMIN C) CHEW Take by mouth daily (with lunch)       cetirizine (ZYRTEC) 10 MG tablet Take 1 tablet by mouth every evening       cyclobenzaprine (FLEXERIL) 10 MG tablet Take 1 tablet by mouth every evening       EPINEPHrine (ANY BX GENERIC EQUIV) 0.3 MG/0.3ML injection 2-pack Inject 0.3 mg into the muscle as needed for anaphylaxis May repeat one time in 5-15 minutes if response to initial dose is inadequate.       fremanezumab-vfrm (AJOVY) SOSY subcutaneous every 30 days Next dose 2/12/23       methylphenidate (RITALIN) 20 MG tablet Take 20 mg by mouth every morning       Multiple Vitamins-Minerals (EYE VITAMINS PO) Take by mouth daily (with lunch)       MULTIVITAMINS OR Take by mouth daily (with lunch)       PROAIR  (90 BASE) MCG/ACT IN AERS Inhale into the lungs as needed       SUDAFED 30 MG OR TABS Take by mouth as needed       SUMAtriptan (IMITREX) 100 MG tablet Take 1 tablet by mouth at onset of headache       SUMAtriptan (IMITREX) 20 MG/ACT nasal spray Spray 1 spray in nostril as needed       UNABLE TO FIND daily (with lunch) MEDICATION NAME: Turkey Tail       Vitamin D (Cholecalciferol) 25 MCG (1000 UT) TABS Take by mouth daily (with lunch)       ZOLMitriptan (ZOMIG-ZMT) 5 MG ODT Take 5 mg by mouth as needed       ammonium lactate (AMLACTIN) 12 % external cream Apply 1 Application topically       ketotifen (ZADITOR) 0.025 % ophthalmic solution 1 drop into affected eye       methylphenidate (CONCERTA) 36 MG CR tablet 2 tablets        tretinoin (RETIN-A) 0.025 % external cream 1 application in the evening to face         Allergies  Allergies   Allergen Reactions     Bee Pollen Anaphylaxis     Other reaction(s): anaphylaxis  BEE Venom       Dog Epithelium Difficulty breathing and Shortness Of Breath     Other reaction(s): difficulty breathing     Walnuts [Nuts] Hives     Doxycycline Rash     Sulfa Drugs Rash     Sulfamethoxazole W/Trimethoprim      Other reaction(s): Unknown     Wasp Venom      Other reaction(s): Unknown       Social History  Social History     Socioeconomic History     Marital status:      Spouse name: Not on file     Number of children: Not on file     Years of education: Not on file     Highest education level: Not on file   Occupational History     Not on file   Tobacco Use     Smoking status: Never     Smokeless tobacco: Never     Tobacco comments:     no smokers in the household   Substance and Sexual Activity     Alcohol use: Not Currently     Drug use: No     Sexual activity: Yes     Partners: Male     Birth control/protection: I.U.D.   Other Topics Concern     Parent/sibling w/ CABG, MI or angioplasty before 65F 55M? Yes   Social History Narrative     Not on file     Social Determinants of Health     Financial Resource Strain: Not on file   Food Insecurity: Not on file   Transportation Needs: Not on file   Physical Activity: Not on file   Stress: Not on file   Social Connections: Not on file   Intimate Partner Violence: Not on file   Housing Stability: Not on file       Family History  Family History   Problem Relation Age of Onset     Gastrointestinal Disease Mother         irritable bowel     Prostate Cancer Father         age 60     Cardiovascular Father         anurysyms     Thyroid Disease Sister         hypothyroid     Thyroid Disease Maternal Grandmother         hypothyroid     Osteoporosis Maternal Grandmother      Cancer Maternal Grandfather         non hodgkins lymphoma     Osteoporosis Paternal  Grandmother      Cerebrovascular Disease Paternal Grandmother      Cardiovascular Paternal Grandmother      Venous thrombosis Paternal Grandmother      Prostate Cancer Paternal Grandfather      Allergies Daughter         food     Allergies Son         food     Anesthesia Reaction No family hx of        Review of Systems  The complete review of systems is negative other than noted in the HPI or here.     Anesthesia Evaluation   Pt has had prior anesthetic. Type of anesthetic: anesthesia for teeth extraction.    No history of anesthetic complications       ROS/MED HX  ENT/Pulmonary: Comment: Wears  for bruxism    (+) SUZAN risk factors, snores loudly, allergic rhinitis, Intermittent, asthma Last exacerbation: non recent,  Treatment: Inhaler prn,      Neurologic: Comment: ADD on Ritalin    (+) migraines,     Cardiovascular:  - neg cardiovascular ROS     METS/Exercise Tolerance: >4 METS    Hematologic:  - neg hematologic  ROS     Musculoskeletal:  - neg musculoskeletal ROS     GI/Hepatic:  - neg GI/hepatic ROS     Renal/Genitourinary:  - neg Renal ROS     Endo:  - neg endo ROS     Psychiatric/Substance Use:  - neg psychiatric ROS     Infectious Disease:  - neg infectious disease ROS     Malignancy:   (+) Malignancy, History of Breast.Breast CA Active status post.        Other:  - neg other ROS          Virtual visit -  No vitals were obtained    Physical Exam  Constitutional: Awake, alert, cooperative, no apparent distress, and appears stated age.  HENT: Normocephalic  Respiratory: non labored breathing   Neurologic: Awake, alert, oriented to name, place and time.   Neuropsychiatric: Calm, cooperative. Normal affect.      Prior Labs/Diagnostic Studies   All labs and imaging personally reviewed     Component      Latest Ref Rng & Units 2/10/2023   Sodium      136 - 145 mmol/L 140   Potassium      3.4 - 5.3 mmol/L 3.9   Chloride      98 - 107 mmol/L 103   Carbon Dioxide (CO2)      22 - 29 mmol/L 26   Anion Gap       7 - 15 mmol/L 11   Urea Nitrogen      6.0 - 20.0 mg/dL 12.3   Creatinine      0.51 - 0.95 mg/dL 0.59   Calcium      8.6 - 10.0 mg/dL 8.8   Glucose      70 - 99 mg/dL 120 (H)   GFR Estimate      >60 mL/min/1.73m2 >90     Component      Latest Ref Rng & Units 2/10/2023   WBC      4.0 - 11.0 10e3/uL 5.4   RBC Count      3.80 - 5.20 10e6/uL 4.75   Hemoglobin      11.7 - 15.7 g/dL 13.7   Hematocrit      35.0 - 47.0 % 42.5   MCV      78 - 100 fL 90   MCH      26.5 - 33.0 pg 28.8   MCHC      31.5 - 36.5 g/dL 32.2   RDW      10.0 - 15.0 % 12.8   Platelet Count      150 - 450 10e3/uL 211       EKG/ stress test - if available please see in ROS above       The patient's records and results personally reviewed by this provider.     Outside records reviewed from: Care Everywhere      Assessment      Philippe Tom is a 52 year old female seen as a PAC referral for risk assessment and optimization for anesthesia.    Plan/Recommendations  Pt will be optimized for the proposed procedure.  See below for details on the assessment, risk, and preoperative recommendations    NEUROLOGY  - No history of TIA, CVA or seizure  - ADD, hold Ritalin DOS  - migraine headaches: hold triptan for 24 hours prior to DOS  -Post Op delirium risk factors:  No risk identified    ENT  - No current airway concerns.  Will need to be reassessed day of surgery.  Mallampati: Unable to assess  TM: Unable to assess    CARDIAC  - No history of CAD, Hypertension and Afib  - METS (Metabolic Equivalents)  Patient performs 4 or more METS exercise without symptoms            Total Score: 0      RCRI-Very low risk: Class 1 0.4% complication rate            Total Score: 0        PULMONARY    SUZAN Low Risk            Total Score: 2    SUZAN: Snores loudly    SUZAN: Over 50 ys old      - Asthma, rare use of albuterol  Well controlled  - Tobacco History      History   Smoking Status     Never   Smokeless Tobacco     Never       GI  - denies GERD  PONV High Risk  Total Score:  "3           1 AN PONV: Pt is Female    1 AN PONV: Patient is not a current smoker    1 AN PONV: Intended Post Op Opioids        /RENAL  - Baseline Creatinine: labs pending    ENDOCRINE    - BMI: Estimated body mass index is 27.29 kg/m  as calculated from the following:    Height as of 1/27/23: 1.65 m (5' 4.96\").    Weight as of 1/27/23: 74.3 kg (163 lb 12.8 oz).  Overweight (BMI 25.0-29.9)  - No history of Diabetes Mellitus     - pt asked about thyroid labs.  Will order but she understands that follow up with be with her PCP.      HEME  VTE High Risk 3%            Total Score: 9    VTE: Family Hx of VTE    VTE: Current cancer      - No history of abnormal bleeding or antiplatelet use.          Different anesthesia methods/types have been discussed with the patient, but they are aware that the final plan will be decided by the assigned anesthesia provider on the date of service.      The patient is optimized for their procedure. AVS with information on surgery time/arrival time, meds and NPO status given by nursing staff. No further diagnostic testing indicated.    Please refer to the physical examination documented by the anesthesiologist in the anesthesia record on the day of surgery.    Video-Visit Details    Type of service:  Video Visit    Provider received verbal consent for a Video Visit from the patient? Yes     Originating Location (pt. Location): Home    Distant Location (provider location):  Off-site  Mode of Communication:  Video Conference via [employer]  On the day of service:     Prep time: 6 minutes  Visit time: 22 minutes  Documentation time: 10 minutes  ------------------------------------------  Total time: 38 minutes      Blanca Andre PA-C  Preoperative Assessment Center  [facility]  Cl[facility] and [facility] Center  Phone: [phone]  Fax: [phone]  "

## 2023-02-10 ENCOUNTER — LAB (OUTPATIENT)
Dept: LAB | Facility: CLINIC | Age: 53
End: 2023-02-10
Payer: COMMERCIAL

## 2023-02-10 DIAGNOSIS — C50.412 MALIGNANT NEOPLASM OF UPPER-OUTER QUADRANT OF LEFT BREAST IN FEMALE, ESTROGEN RECEPTOR POSITIVE (H): ICD-10-CM

## 2023-02-10 DIAGNOSIS — Z01.818 PREOP EXAMINATION: ICD-10-CM

## 2023-02-10 DIAGNOSIS — Z17.0 MALIGNANT NEOPLASM OF UPPER-OUTER QUADRANT OF LEFT BREAST IN FEMALE, ESTROGEN RECEPTOR POSITIVE (H): ICD-10-CM

## 2023-02-10 LAB
ANION GAP SERPL CALCULATED.3IONS-SCNC: 11 MMOL/L (ref 7–15)
BUN SERPL-MCNC: 12.3 MG/DL (ref 6–20)
CALCIUM SERPL-MCNC: 8.8 MG/DL (ref 8.6–10)
CHLORIDE SERPL-SCNC: 103 MMOL/L (ref 98–107)
CREAT SERPL-MCNC: 0.59 MG/DL (ref 0.51–0.95)
DEPRECATED HCO3 PLAS-SCNC: 26 MMOL/L (ref 22–29)
ERYTHROCYTE [DISTWIDTH] IN BLOOD BY AUTOMATED COUNT: 12.8 % (ref 10–15)
GFR SERPL CREATININE-BSD FRML MDRD: >90 ML/MIN/1.73M2
GLUCOSE SERPL-MCNC: 120 MG/DL (ref 70–99)
HCT VFR BLD AUTO: 42.5 % (ref 35–47)
HGB BLD-MCNC: 13.7 G/DL (ref 11.7–15.7)
MCH RBC QN AUTO: 28.8 PG (ref 26.5–33)
MCHC RBC AUTO-ENTMCNC: 32.2 G/DL (ref 31.5–36.5)
MCV RBC AUTO: 90 FL (ref 78–100)
PLATELET # BLD AUTO: 211 10E3/UL (ref 150–450)
POTASSIUM SERPL-SCNC: 3.9 MMOL/L (ref 3.4–5.3)
RBC # BLD AUTO: 4.75 10E6/UL (ref 3.8–5.2)
SODIUM SERPL-SCNC: 140 MMOL/L (ref 136–145)
TSH SERPL DL<=0.005 MIU/L-ACNC: 2.03 UIU/ML (ref 0.3–4.2)
WBC # BLD AUTO: 5.4 10E3/UL (ref 4–11)

## 2023-02-10 PROCEDURE — 36415 COLL VENOUS BLD VENIPUNCTURE: CPT

## 2023-02-10 PROCEDURE — 85027 COMPLETE CBC AUTOMATED: CPT

## 2023-02-10 PROCEDURE — 84443 ASSAY THYROID STIM HORMONE: CPT

## 2023-02-10 PROCEDURE — 80048 BASIC METABOLIC PNL TOTAL CA: CPT

## 2023-02-13 ENCOUNTER — VIRTUAL VISIT (OUTPATIENT)
Dept: ONCOLOGY | Facility: CLINIC | Age: 53
End: 2023-02-13
Attending: SURGERY
Payer: COMMERCIAL

## 2023-02-13 DIAGNOSIS — D05.12 DUCTAL CARCINOMA IN SITU (DCIS) OF LEFT BREAST: Primary | ICD-10-CM

## 2023-02-13 PROCEDURE — 99213 OFFICE O/P EST LOW 20 MIN: CPT | Mod: TEL | Performed by: SURGERY

## 2023-02-13 NOTE — NURSING NOTE
Is the patient currently in the state of [place]? YES    Visit mode:TELEPHONE    If the visit is dropped, the patient can be reconnected by: TELEPHONE VISIT: Phone number: [phone]     Will anyone else be joining the visit? NO      How would you like to obtain your AVS? MyChart    Are changes needed to the allergy or medication list? NO    Comments or concerns regarding today's visit: none      Marta FIERRO

## 2023-02-13 NOTE — LETTER
2/13/2023         RE: Philippe Medellin  [facility] [address]        Dear Colleague,    Thank you for referring your patient, Philippe Medellin, to the [facility]. Please see a copy of my visit note below.    TELEPHONE VISIT     Today I had a phone visit with Philippe Sandhu.  She was diagnosed with a subcentimeter DCIS with microinvasion.  Since I have seen her last, she had a contrast-enhanced mammography, which showed no other suspicious findings.  She also had a radiofrequency seed placed on 02/06.  She is scheduled for a lumpectomy and a sentinel lymph node biopsy in 2 days.  She had multiple questions today about whether or not she can have a breast reduction afterwards, when she can have a hysterectomy, assessment of her margins and radiation.  All of her questions were asked and answered.    The phone visit was 18 minutes long.  The total visit was 25 minutes long in reviewing her most recent imaging.  We will tentatively schedule her for a seed localized lumpectomy and sentinel lymph node biopsy in 2 days.    Video-Visit Details    Type of service:  Video Visit          Noman Diggs MD

## 2023-02-13 NOTE — PROGRESS NOTES
TELEPHONE VISIT     Today I had a phone visit with Philippe Sandhu.  She was diagnosed with a subcentimeter DCIS with microinvasion.  Since I have seen her last, she had a contrast-enhanced mammography, which showed no other suspicious findings.  She also had a radiofrequency seed placed on 02/06.  She is scheduled for a lumpectomy and a sentinel lymph node biopsy in 2 days.  She had multiple questions today about whether or not she can have a breast reduction afterwards, when she can have a hysterectomy, assessment of her margins and radiation.  All of her questions were asked and answered.    The phone visit was 18 minutes long.  The total visit was 25 minutes long in reviewing her most recent imaging.  We will tentatively schedule her for a seed localized lumpectomy and sentinel lymph node biopsy in 2 days.

## 2023-02-14 ASSESSMENT — LIFESTYLE VARIABLES: TOBACCO_USE: 0

## 2023-02-14 NOTE — TELEPHONE ENCOUNTER
Signed form received and faxed to [facility], fax # [phone]. Successful transmission verified via rightfax. Copy of forms sent to scanning, original forms mailed to patient's home address on file.    Renard Fermin on 2/14/2023 at 9:44 AM

## 2023-02-14 NOTE — ANESTHESIA PREPROCEDURE EVALUATION
Anesthesia Pre-Procedure Evaluation    Patient: Philippe Tom   MRN: [id number] : 1970        Procedure : Procedure(s):  LUMPECTOMY, BREAST, WITH RADIOACTIVE SEED LOCALIZATION AND SENTINEL LYMPH NODE BIOPSY          Past Medical History:   Diagnosis Date     ADD (attention deficit disorder)     diagnosed       Allergies     had allergy testing as a child     Asthma      Migraine       Past Surgical History:   Procedure Laterality Date     DENTAL SURGERY       INSERT INTRAUTERINE DEVICE        Allergies   Allergen Reactions     Bee Pollen Anaphylaxis     Other reaction(s): anaphylaxis  BEE Venom       Dog Epithelium Difficulty breathing and Shortness Of Breath     Other reaction(s): difficulty breathing     Walnuts [Nuts] Hives     Doxycycline Rash     Sulfa Drugs Rash     Sulfamethoxazole W/Trimethoprim      Other reaction(s): Unknown     Wasp Venom      Other reaction(s): Unknown      Social History     Tobacco Use     Smoking status: Never     Smokeless tobacco: Never     Tobacco comments:     no smokers in the household   Substance Use Topics     Alcohol use: Not Currently      Wt Readings from Last 1 Encounters:   23 74.3 kg (163 lb 12.8 oz)        Anesthesia Evaluation            ROS/MED HX  ENT/Pulmonary:     (+) sleep apnea, asthma  (-) tobacco use   Neurologic:     (+) migraines,     Cardiovascular:       METS/Exercise Tolerance: >4 METS    Hematologic:       Musculoskeletal:       GI/Hepatic:       Renal/Genitourinary:       Endo:       Psychiatric/Substance Use: Comment: ADD      Infectious Disease:       Malignancy:   (+) Malignancy, History of Breast.    Other:            Physical Exam    Airway        Mallampati: II   TM distance: > 3 FB   Neck ROM: full   Mouth opening: > 3 cm    Respiratory Devices and Support         Dental       (+) Completely normal teeth      Cardiovascular   cardiovascular exam normal          Pulmonary   pulmonary exam normal                OUTSIDE  LABS:  CBC:   Lab Results   Component Value Date    WBC 5.4 02/10/2023    WBC 9.6 04/03/2009    HGB 13.7 02/10/2023    HGB 13.9 04/03/2009    HCT 42.5 02/10/2023    HCT 41.3 04/03/2009     02/10/2023     04/03/2009     BMP:   Lab Results   Component Value Date     02/10/2023    POTASSIUM 3.9 02/10/2023    CHLORIDE 103 02/10/2023    CO2 26 02/10/2023    BUN 12.3 02/10/2023    CR 0.59 02/10/2023     (H) 02/10/2023     COAGS: No results found for: PTT, INR, FIBR  POC:   Lab Results   Component Value Date    HCG Negative 04/03/2009     HEPATIC: No results found for: ALBUMIN, PROTTOTAL, ALT, AST, GGT, ALKPHOS, BILITOTAL, BILIDIRECT, JANEE  OTHER:   Lab Results   Component Value Date    NATHANAEL 8.8 02/10/2023    TSH 2.03 02/10/2023       Anesthesia Plan    ASA Status:  2   NPO Status:  NPO Appropriate    Anesthesia Type: General.     - Airway: LMA   Induction: Intravenous.           Consents    Anesthesia Plan(s) and associated risks, benefits, and realistic alternatives discussed. Questions answered and patient/representative(s) expressed understanding.     - Discussed: Risks, Benefits and Alternatives for BOTH SEDATION and the PROCEDURE were discussed     - Discussed with:  Patient         Postoperative Care    Pain management: IV analgesics, Multi-modal analgesia.   PONV prophylaxis: Ondansetron (or other 5HT-3), Dexamethasone or Solumedrol     Comments:                Adonay Urias MD

## 2023-02-15 ENCOUNTER — HOSPITAL ENCOUNTER (OUTPATIENT)
Facility: CLINIC | Age: 53
Discharge: HOME | End: 2023-02-15
Attending: SURGERY | Admitting: SURGERY
Payer: COMMERCIAL

## 2023-02-15 ENCOUNTER — ANESTHESIA (OUTPATIENT)
Dept: SURGERY | Facility: CLINIC | Age: 53
End: 2023-02-15
Payer: COMMERCIAL

## 2023-02-15 ENCOUNTER — ANCILLARY PROCEDURE (OUTPATIENT)
Dept: MAMMOGRAPHY | Facility: CLINIC | Age: 53
End: 2023-02-15
Attending: SURGERY
Payer: COMMERCIAL

## 2023-02-15 ENCOUNTER — HOSPITAL ENCOUNTER (OUTPATIENT)
Dept: NUCLEAR MEDICINE | Facility: CLINIC | Age: 53
Setting detail: NUCLEAR MEDICINE
Discharge: HOME | End: 2023-02-15
Attending: SURGERY | Admitting: RADIOLOGY
Payer: COMMERCIAL

## 2023-02-15 VITALS
SYSTOLIC BLOOD PRESSURE: 119 MMHG | DIASTOLIC BLOOD PRESSURE: 72 MMHG | RESPIRATION RATE: 16 BRPM | WEIGHT: 164.02 LBS | TEMPERATURE: 98.1 F | OXYGEN SATURATION: 97 % | HEART RATE: 82 BPM | BODY MASS INDEX: 27.33 KG/M2 | HEIGHT: 65 IN

## 2023-02-15 DIAGNOSIS — C50.412 MALIGNANT NEOPLASM OF UPPER-OUTER QUADRANT OF LEFT BREAST IN FEMALE, ESTROGEN RECEPTOR POSITIVE (H): ICD-10-CM

## 2023-02-15 DIAGNOSIS — Z17.0 MALIGNANT NEOPLASM OF UPPER-OUTER QUADRANT OF LEFT BREAST IN FEMALE, ESTROGEN RECEPTOR POSITIVE (H): ICD-10-CM

## 2023-02-15 DIAGNOSIS — D05.12 DUCTAL CARCINOMA IN SITU (DCIS) OF LEFT BREAST: ICD-10-CM

## 2023-02-15 DIAGNOSIS — D05.12 DUCTAL CARCINOMA IN SITU (DCIS) OF LEFT BREAST: Primary | ICD-10-CM

## 2023-02-15 PROCEDURE — 343N000001 HC RX 343: Performed by: SURGERY

## 2023-02-15 PROCEDURE — 250N000011 HC RX IP 250 OP 636: Performed by: ANESTHESIOLOGY

## 2023-02-15 PROCEDURE — 88305 TISSUE EXAM BY PATHOLOGIST: CPT | Mod: TC | Performed by: SURGERY

## 2023-02-15 PROCEDURE — 272N000001 HC OR GENERAL SUPPLY STERILE: Performed by: SURGERY

## 2023-02-15 PROCEDURE — 19301 PARTIAL MASTECTOMY: CPT | Mod: LT | Performed by: SURGERY

## 2023-02-15 PROCEDURE — 250N000009 HC RX 250: Performed by: SURGERY

## 2023-02-15 PROCEDURE — 38900 IO MAP OF SENT LYMPH NODE: CPT | Mod: LT | Performed by: SURGERY

## 2023-02-15 PROCEDURE — 250N000011 HC RX IP 250 OP 636: Performed by: NURSE ANESTHETIST, CERTIFIED REGISTERED

## 2023-02-15 PROCEDURE — 88307 TISSUE EXAM BY PATHOLOGIST: CPT | Mod: 26 | Performed by: PATHOLOGY

## 2023-02-15 PROCEDURE — 88305 TISSUE EXAM BY PATHOLOGIST: CPT | Mod: 26 | Performed by: PATHOLOGY

## 2023-02-15 PROCEDURE — 38525 BIOPSY/REMOVAL LYMPH NODES: CPT | Mod: LT | Performed by: SURGERY

## 2023-02-15 PROCEDURE — 250N000025 HC SEVOFLURANE, PER MIN: Performed by: SURGERY

## 2023-02-15 PROCEDURE — 258N000003 HC RX IP 258 OP 636: Performed by: NURSE ANESTHETIST, CERTIFIED REGISTERED

## 2023-02-15 PROCEDURE — 38792 RA TRACER ID OF SENTINL NODE: CPT

## 2023-02-15 PROCEDURE — 370N000017 HC ANESTHESIA TECHNICAL FEE, PER MIN: Performed by: SURGERY

## 2023-02-15 PROCEDURE — 360N000075 HC SURGERY LEVEL 2, PER MIN: Performed by: SURGERY

## 2023-02-15 PROCEDURE — 250N000013 HC RX MED GY IP 250 OP 250 PS 637: Performed by: STUDENT IN AN ORGANIZED HEALTH CARE EDUCATION/TRAINING PROGRAM

## 2023-02-15 PROCEDURE — 250N000009 HC RX 250: Performed by: ANESTHESIOLOGY

## 2023-02-15 PROCEDURE — 88360 TUMOR IMMUNOHISTOCHEM/MANUAL: CPT | Mod: 26 | Performed by: PATHOLOGY

## 2023-02-15 PROCEDURE — 250N000013 HC RX MED GY IP 250 OP 250 PS 637: Performed by: ANESTHESIOLOGY

## 2023-02-15 PROCEDURE — 710N000010 HC RECOVERY PHASE 1, LEVEL 2, PER MIN: Performed by: SURGERY

## 2023-02-15 PROCEDURE — 710N000012 HC RECOVERY PHASE 2, PER MINUTE: Performed by: SURGERY

## 2023-02-15 PROCEDURE — 99207 NM LYMPHOSCINTIGRAPHY INJECTION ONLY: CPT | Performed by: RADIOLOGY

## 2023-02-15 PROCEDURE — 999N000141 HC STATISTIC PRE-PROCEDURE NURSING ASSESSMENT: Performed by: SURGERY

## 2023-02-15 PROCEDURE — 272N000002 HC OR SUPPLY OTHER OPNP: Performed by: SURGERY

## 2023-02-15 PROCEDURE — A9520 TC99 TILMANOCEPT DIAG 0.5MCI: HCPCS | Performed by: SURGERY

## 2023-02-15 PROCEDURE — 76098 X-RAY EXAM SURGICAL SPECIMEN: CPT | Mod: LT

## 2023-02-15 RX ORDER — SODIUM CHLORIDE, SODIUM LACTATE, POTASSIUM CHLORIDE, CALCIUM CHLORIDE 600; 310; 30; 20 MG/100ML; MG/100ML; MG/100ML; MG/100ML
INJECTION, SOLUTION INTRAVENOUS CONTINUOUS PRN
Status: DISCONTINUED | OUTPATIENT
Start: 2023-02-15 | End: 2023-02-15

## 2023-02-15 RX ORDER — SODIUM CHLORIDE, SODIUM LACTATE, POTASSIUM CHLORIDE, CALCIUM CHLORIDE 600; 310; 30; 20 MG/100ML; MG/100ML; MG/100ML; MG/100ML
INJECTION, SOLUTION INTRAVENOUS CONTINUOUS
Status: DISCONTINUED | OUTPATIENT
Start: 2023-02-15 | End: 2023-02-15 | Stop reason: HOSPADM

## 2023-02-15 RX ORDER — ONDANSETRON 2 MG/ML
INJECTION INTRAMUSCULAR; INTRAVENOUS PRN
Status: DISCONTINUED | OUTPATIENT
Start: 2023-02-15 | End: 2023-02-15

## 2023-02-15 RX ORDER — ACETAMINOPHEN 325 MG/1
975 TABLET ORAL ONCE
Status: COMPLETED | OUTPATIENT
Start: 2023-02-15 | End: 2023-02-15

## 2023-02-15 RX ORDER — CEFAZOLIN SODIUM 1 G/3ML
INJECTION, POWDER, FOR SOLUTION INTRAMUSCULAR; INTRAVENOUS PRN
Status: DISCONTINUED | OUTPATIENT
Start: 2023-02-15 | End: 2023-02-15

## 2023-02-15 RX ORDER — ACETAMINOPHEN 325 MG/1
650 TABLET ORAL
Status: CANCELLED | OUTPATIENT
Start: 2023-02-15

## 2023-02-15 RX ORDER — ONDANSETRON 4 MG/1
4 TABLET, ORALLY DISINTEGRATING ORAL EVERY 30 MIN PRN
Status: DISCONTINUED | OUTPATIENT
Start: 2023-02-15 | End: 2023-02-15 | Stop reason: HOSPADM

## 2023-02-15 RX ORDER — LIDOCAINE 40 MG/G
CREAM TOPICAL
Status: DISCONTINUED | OUTPATIENT
Start: 2023-02-15 | End: 2023-02-15 | Stop reason: HOSPADM

## 2023-02-15 RX ORDER — ACETAMINOPHEN 325 MG/1
650 TABLET ORAL EVERY 4 HOURS PRN
Qty: 50 TABLET | Refills: 0 | Status: SHIPPED | OUTPATIENT
Start: 2023-02-15

## 2023-02-15 RX ORDER — OXYCODONE HYDROCHLORIDE 10 MG/1
10 TABLET ORAL EVERY 4 HOURS PRN
Status: DISCONTINUED | OUTPATIENT
Start: 2023-02-15 | End: 2023-02-15 | Stop reason: HOSPADM

## 2023-02-15 RX ORDER — BUPIVACAINE HYDROCHLORIDE AND EPINEPHRINE 2.5; 5 MG/ML; UG/ML
INJECTION, SOLUTION INFILTRATION; PERINEURAL PRN
Status: DISCONTINUED | OUTPATIENT
Start: 2023-02-15 | End: 2023-02-15 | Stop reason: HOSPADM

## 2023-02-15 RX ORDER — DEXAMETHASONE SODIUM PHOSPHATE 4 MG/ML
INJECTION, SOLUTION INTRA-ARTICULAR; INTRALESIONAL; INTRAMUSCULAR; INTRAVENOUS; SOFT TISSUE PRN
Status: DISCONTINUED | OUTPATIENT
Start: 2023-02-15 | End: 2023-02-15

## 2023-02-15 RX ORDER — ONDANSETRON 4 MG/1
4 TABLET, ORALLY DISINTEGRATING ORAL
Status: CANCELLED | OUTPATIENT
Start: 2023-02-15

## 2023-02-15 RX ORDER — FENTANYL CITRATE 50 UG/ML
25 INJECTION, SOLUTION INTRAMUSCULAR; INTRAVENOUS EVERY 5 MIN PRN
Status: DISCONTINUED | OUTPATIENT
Start: 2023-02-15 | End: 2023-02-15 | Stop reason: HOSPADM

## 2023-02-15 RX ORDER — LIDOCAINE HYDROCHLORIDE 20 MG/ML
INJECTION, SOLUTION INFILTRATION; PERINEURAL PRN
Status: DISCONTINUED | OUTPATIENT
Start: 2023-02-15 | End: 2023-02-15

## 2023-02-15 RX ORDER — FENTANYL CITRATE 50 UG/ML
INJECTION, SOLUTION INTRAMUSCULAR; INTRAVENOUS PRN
Status: DISCONTINUED | OUTPATIENT
Start: 2023-02-15 | End: 2023-02-15

## 2023-02-15 RX ORDER — OXYCODONE HYDROCHLORIDE 5 MG/1
5 TABLET ORAL
Status: CANCELLED | OUTPATIENT
Start: 2023-02-15

## 2023-02-15 RX ORDER — OXYCODONE HYDROCHLORIDE 5 MG/1
5 TABLET ORAL EVERY 4 HOURS PRN
Status: DISCONTINUED | OUTPATIENT
Start: 2023-02-15 | End: 2023-02-15 | Stop reason: HOSPADM

## 2023-02-15 RX ORDER — OXYCODONE HYDROCHLORIDE 5 MG/1
5-10 TABLET ORAL EVERY 4 HOURS PRN
Qty: 6 TABLET | Refills: 0 | Status: SHIPPED | OUTPATIENT
Start: 2023-02-15 | End: 2023-03-23

## 2023-02-15 RX ORDER — HYDROMORPHONE HYDROCHLORIDE 1 MG/ML
0.2 INJECTION, SOLUTION INTRAMUSCULAR; INTRAVENOUS; SUBCUTANEOUS EVERY 5 MIN PRN
Status: DISCONTINUED | OUTPATIENT
Start: 2023-02-15 | End: 2023-02-15 | Stop reason: HOSPADM

## 2023-02-15 RX ORDER — ONDANSETRON 2 MG/ML
4 INJECTION INTRAMUSCULAR; INTRAVENOUS EVERY 30 MIN PRN
Status: DISCONTINUED | OUTPATIENT
Start: 2023-02-15 | End: 2023-02-15 | Stop reason: HOSPADM

## 2023-02-15 RX ORDER — KETOROLAC TROMETHAMINE 30 MG/ML
INJECTION, SOLUTION INTRAMUSCULAR; INTRAVENOUS PRN
Status: DISCONTINUED | OUTPATIENT
Start: 2023-02-15 | End: 2023-02-15

## 2023-02-15 RX ORDER — PROPOFOL 10 MG/ML
INJECTION, EMULSION INTRAVENOUS PRN
Status: DISCONTINUED | OUTPATIENT
Start: 2023-02-15 | End: 2023-02-15

## 2023-02-15 RX ORDER — HYDROMORPHONE HYDROCHLORIDE 1 MG/ML
0.4 INJECTION, SOLUTION INTRAMUSCULAR; INTRAVENOUS; SUBCUTANEOUS EVERY 5 MIN PRN
Status: DISCONTINUED | OUTPATIENT
Start: 2023-02-15 | End: 2023-02-15 | Stop reason: HOSPADM

## 2023-02-15 RX ORDER — FENTANYL CITRATE 50 UG/ML
50 INJECTION, SOLUTION INTRAMUSCULAR; INTRAVENOUS EVERY 5 MIN PRN
Status: DISCONTINUED | OUTPATIENT
Start: 2023-02-15 | End: 2023-02-15 | Stop reason: HOSPADM

## 2023-02-15 RX ADMIN — FENTANYL CITRATE 50 MCG: 50 INJECTION, SOLUTION INTRAMUSCULAR; INTRAVENOUS at 09:27

## 2023-02-15 RX ADMIN — FENTANYL CITRATE 100 MCG: 50 INJECTION, SOLUTION INTRAMUSCULAR; INTRAVENOUS at 09:11

## 2023-02-15 RX ADMIN — PHENYLEPHRINE HYDROCHLORIDE 150 MCG: 10 INJECTION INTRAVENOUS at 09:37

## 2023-02-15 RX ADMIN — MIDAZOLAM 2 MG: 1 INJECTION INTRAMUSCULAR; INTRAVENOUS at 09:01

## 2023-02-15 RX ADMIN — ACETAMINOPHEN 975 MG: 325 TABLET ORAL at 07:58

## 2023-02-15 RX ADMIN — PHENYLEPHRINE HYDROCHLORIDE 100 MCG: 10 INJECTION INTRAVENOUS at 09:23

## 2023-02-15 RX ADMIN — FENTANYL CITRATE 25 MCG: 50 INJECTION, SOLUTION INTRAMUSCULAR; INTRAVENOUS at 10:46

## 2023-02-15 RX ADMIN — DEXAMETHASONE SODIUM PHOSPHATE 8 MG: 4 INJECTION, SOLUTION INTRA-ARTICULAR; INTRALESIONAL; INTRAMUSCULAR; INTRAVENOUS; SOFT TISSUE at 09:19

## 2023-02-15 RX ADMIN — FENTANYL CITRATE 25 MCG: 50 INJECTION, SOLUTION INTRAMUSCULAR; INTRAVENOUS at 12:45

## 2023-02-15 RX ADMIN — SODIUM CHLORIDE, POTASSIUM CHLORIDE, SODIUM LACTATE AND CALCIUM CHLORIDE: 600; 310; 30; 20 INJECTION, SOLUTION INTRAVENOUS at 09:07

## 2023-02-15 RX ADMIN — KETOROLAC TROMETHAMINE 30 MG: 30 INJECTION, SOLUTION INTRAMUSCULAR at 10:55

## 2023-02-15 RX ADMIN — OXYCODONE HYDROCHLORIDE 5 MG: 5 TABLET ORAL at 12:22

## 2023-02-15 RX ADMIN — ONDANSETRON HYDROCHLORIDE 4 MG: 2 INJECTION, SOLUTION INTRAMUSCULAR; INTRAVENOUS at 11:34

## 2023-02-15 RX ADMIN — PHENYLEPHRINE HYDROCHLORIDE 100 MCG: 10 INJECTION INTRAVENOUS at 09:11

## 2023-02-15 RX ADMIN — CEFAZOLIN 2 G: 1 INJECTION, POWDER, FOR SOLUTION INTRAMUSCULAR; INTRAVENOUS at 09:45

## 2023-02-15 RX ADMIN — ONDANSETRON 4 MG: 2 INJECTION INTRAMUSCULAR; INTRAVENOUS at 10:57

## 2023-02-15 RX ADMIN — LIDOCAINE HYDROCHLORIDE 100 MG: 20 INJECTION, SOLUTION INFILTRATION; PERINEURAL at 09:11

## 2023-02-15 RX ADMIN — PROPOFOL 180 MG: 10 INJECTION, EMULSION INTRAVENOUS at 09:11

## 2023-02-15 RX ADMIN — FENTANYL CITRATE 25 MCG: 50 INJECTION, SOLUTION INTRAMUSCULAR; INTRAVENOUS at 10:56

## 2023-02-15 ASSESSMENT — ACTIVITIES OF DAILY LIVING (ADL)
ADLS_ACUITY_SCORE: 35
ADLS_ACUITY_SCORE: 33

## 2023-02-15 NOTE — OR NURSING
Pt has have vaginal bleeding from hx of fibroids. Anesthesia notified, pt ok to keep tampon in place through procedure.

## 2023-02-15 NOTE — ANESTHESIA PROCEDURE NOTES
Airway       Patient location during procedure: OR  Staff -        CRNA: Becca Umanzor APRN CRNA       Performed By: CRNAIndications and Patient Condition       Indications for airway management: dmitry-procedural       Induction type:intravenous       Mask difficulty assessment: 1 - vent by mask    Final Airway Details       Final airway type: supraglottic airway    Supraglottic Airway Details        Type: LMA       Brand: I-Gel       LMA size: 4    Post intubation assessment        Placement verified by: capnometry        Number of attempts at approach: 1       Ease of procedure: easy       Dentition: Intact

## 2023-02-15 NOTE — BRIEF OP NOTE
[facility]    Brief Operative Note    Pre-operative diagnosis: Malignant neoplasm of upper-outer quadrant of left breast in female, estrogen receptor positive (H) [C50.412, Z17.0]  Post-operative diagnosis Same as pre-operative diagnosis  Procedure(s):  LUMPECTOMY, BREAST, WITH RADIOACTIVE SEED LOCALIZATION AND SENTINEL LYMPH NODE BIOPSY  Surgeon(s) and Role:     * Noman Diggs MD - Primary   Jodie Juarez MD PGY-5  Anesthesia: General   Estimated Blood Loss: 2 ml    Drains: None  Specimens:   ID Type Source Tests Collected by Time Destination   1 : left lumpectomy Tissue Breast, Left SURGICAL PATHOLOGY EXAM Noman Diggs MD 2/15/2023 10:23 AM    2 : new anterior margin, stitch at new margin Tissue Breast, Left SURGICAL PATHOLOGY EXAM Noman Diggs MD 2/15/2023 10:23 AM    3 : new lateral margin, stitch at new margin Tissue Breast, Left SURGICAL PATHOLOGY EXAM Noman Diggs MD 2/15/2023 10:25 AM    4 : new medial margin, stitch at new margin Tissue Breast, Left SURGICAL PATHOLOGY EXAM Noman Diggs MD 2/15/2023 10:27 AM    5 : left axillary sentinel lymph node #1 Tissue Lymph Node(s) [place], Breast, Left SURGICAL PATHOLOGY EXAM Noman Diggs MD 2/15/2023 10:41 AM    6 : left axillary sentinel lymph node #2 Tissue Lymph Node(s) Placedo, Breast, Left SURGICAL PATHOLOGY EXAM Noman Diggs MD 2/15/2023 10:45 AM      Findings:   Seed seen on xray  Complications: None.  Implants: * No implants in log *

## 2023-02-15 NOTE — ANESTHESIA CARE TRANSFER NOTE
Patient: Philippe Medellin    Procedure: Procedure(s):  LUMPECTOMY, BREAST, WITH RADIOACTIVE SEED LOCALIZATION AND SENTINEL LYMPH NODE BIOPSY       Diagnosis: Malignant neoplasm of upper-outer quadrant of left breast in female, estrogen receptor positive (H) [C50.412, Z17.0]  Diagnosis Additional Information: No value filed.    Anesthesia Type:   General     Note:    Oropharynx: oropharynx clear of all foreign objects and spontaneously breathing  Level of Consciousness: drowsy  Oxygen Supplementation: nasal cannula  Level of Supplemental Oxygen (L/min / FiO2): 3  Independent Airway: airway patency satisfactory and stable  Dentition: dentition unchanged  Vital Signs Stable: post-procedure vital signs reviewed and stable  Report to RN Given: handoff report given  Patient transferred to: PACU    Handoff Report: Identifed the Patient, Identified the Reponsible Provider, Reviewed the pertinent medical history, Discussed the surgical course, Reviewed Intra-OP anesthesia mangement and issues during anesthesia, Set expectations for post-procedure period and Allowed opportunity for questions and acknowledgement of understanding      Vitals:  Vitals Value Taken Time   /68 02/15/23 1118   Temp     Pulse 97 02/15/23 1118   Resp     SpO2 98 % 02/15/23 1119   Vitals shown include unvalidated device data.    Electronically Signed By: JOVANI Bates CRNA  February 15, 2023  11:20 AM

## 2023-02-15 NOTE — DISCHARGE INSTRUCTIONS
[facility], [place]  Same-Day Surgery   Adult Discharge Orders & Instructions     For 24 hours after surgery    Get plenty of rest.  A responsible adult must stay with you for at least 24 hours after you leave the hospital.   Do not drive or use heavy equipment.  If you have weakness or tingling, don't drive or use heavy equipment until this feeling goes away.  Do not drink alcohol.  Avoid strenuous or risky activities.  Ask for help when climbing stairs.   You may feel lightheaded.  IF so, sit for a few minutes before standing.  Have someone help you get up.   If you have nausea (feel sick to your stomach): Drink only clear liquids such as apple juice, ginger ale, broth or 7-Up.  Rest may also help.  Be sure to drink enough fluids.  Move to a regular diet as you feel able.  You may have a slight fever. Call the doctor if your fever is over 100 F (37.7 C) (taken under the tongue) or lasts longer than 24 hours.  You may have a dry mouth, a sore throat, muscle aches or trouble sleeping.  These should go away after 24 hours.  Do not make important or legal decisions.   Call your doctor for any of the followin.  Signs of infection (fever, growing tenderness at the surgery site, a large amount of drainage or bleeding, severe pain, foul-smelling drainage, redness, swelling).    2. It has been over 8 to 10 hours since surgery and you are still not able to urinate (pass water).    3.  Headache for over 24 hours.      To contact a doctor, call __________Dr Diggs's office at [phone] ([facility] clinic) __________ or:    '   [phone] and ask for the resident on call for   ______________surgical oncology ________ (answered 24 hours a day)  '   Emergency Department:    [facility]: [phone]       (TTY for hearing impaired: [phone])

## 2023-02-15 NOTE — ANESTHESIA POSTPROCEDURE EVALUATION
Patient: Philippe Medellin    Procedure: Procedure(s):  LUMPECTOMY, BREAST, WITH RADIOACTIVE SEED LOCALIZATION AND SENTINEL LYMPH NODE BIOPSY       Anesthesia Type:  General    Note:  Disposition: Outpatient   Postop Pain Control: Uneventful            Sign Out: Well controlled pain   PONV: No   Neuro/Psych: Uneventful            Sign Out: Acceptable/Baseline neuro status   Airway/Respiratory: Uneventful            Sign Out: Acceptable/Baseline resp. status   CV/Hemodynamics: Uneventful            Sign Out: Acceptable CV status; No obvious hypovolemia; No obvious fluid overload   Other NRE: NONE   DID A NON-ROUTINE EVENT OCCUR? No           Last vitals:  Vitals Value Taken Time   /68 02/15/23 1118   Temp     Pulse 107 02/15/23 1131   Resp 30 02/15/23 1131   SpO2 98 % 02/15/23 1131   Vitals shown include unvalidated device data.    Electronically Signed By: Adonay Urias MD  February 15, 2023  11:32 AM

## 2023-02-15 NOTE — OP NOTE
Procedure Date: 02/15/2023    PREOPERATIVE DIAGNOSIS:  Left breast cancer.    POSTOPERATIVE DIAGNOSIS:  Left breast cancer.    PROCEDURE:  Seed localized left lumpectomy, lymphatic mapping and left axillary sentinel lymph node biopsy x2.    ATTENDING SURGEON:  Noman Diggs M.D.     RESIDENT SURGEON:  Jodie Juarez M.D.     ANESTHESIA:  General with LMA.    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  She had a radiofrequency seed placed in the breast center before surgery.  She now presents for surgical treatment.    DESCRIPTION OF PROCEDURE:  After informed consent, the patient was brought to the operating room, given a general anesthetic, and LMA was placed.  I injected technetium sulfur colloid and ICG in her left breast.  She was then prepped and draped in the usual fashion.  We started with the lumpectomy.  We identified the tumor with the localizer in the outer portion of the left breast.  A curvilinear incision was made with a scalpel.  The Bovie cautery was used to incise subcutaneous tissues.  We used the localizer to identify the tumor.  We dissected circumferentially around the seed to ensure adequate surgical margins.  The specimen was removed, oriented, and a specimen radiograph demonstrated complete excision of the targeted lesion and the radiofrequency seed.  I did take additional medial, lateral and anterior margins because I thought those were the closest margins.  Strict hemostasis was obtained with the Bovie cautery.  Surgical clips were placed in the resection bed to facilitate radiation therapy.  Next, we identified a transcutaneous hot spot in the left axilla.  Local anesthetic was administered and a transverse axillary incision was made with a scalpel.  Bovie cautery was used to incise subcutaneous tissues.  The dissection went through the clavipectoral fascia.  I identified sentinel lymph  node #1.  It was removed and had ex vivo counts of 150 counts per second.  This lymph node was also fluorescent.  I identified a second sentinel lymph node that was removed and had ex vivo counts of 350 counts per second.  This lymph node was not fluorescent.  Strict hemostasis was obtained with the Bovie cautery.  After removal of the second sentinel lymph node, there were no additional radioactive, fluorescent or palpable lymph nodes.  Local anesthetic was administered to the lumpectomy cavity.  Both incisions were closed with interrupted 3-0 Vicryl suture for the dermal layer and a running 4-0 PDS subcuticular stitch for the skin.  Dermabond was placed and the patient was taken to the recovery room in stable condition.    Noman Diggs MD    [place] Node Biopsy for Breast Cancer - Left  Operation performed with curative intent Yes   Tracer(s) used to identify sentinel nodes in the upfront surgery (non-neoadjuvant) setting Dye   Tracer(s) used to identify sentinel nodes in the neoadjuvant setting Radioactive tracer   All nodes (colored or non-colored) present at the end of a dye-filled lymphatic channel were removed Yes   All significantly radioactive nodes were removed Yes   All palpably suspicious nodes were removed Yes   Biopsy-proven positive nodes marked with clips prior to chemotherapy were identified and removed Yes             D: 02/15/2023   T: 02/15/2023   MT: [id number]    Name:     JEFF PLATAHUSSAIN  MRN:      [id number]        Account:        [id number]   :      1970           Procedure Date: 02/15/2023     Document: [id number]

## 2023-02-16 ENCOUNTER — PATIENT OUTREACH (OUTPATIENT)
Dept: ONCOLOGY | Facility: CLINIC | Age: 53
End: 2023-02-16
Payer: COMMERCIAL

## 2023-02-16 NOTE — PROGRESS NOTES
Post Op Discharge Call     Surgery:       Seed localized left lumpectomy, lymphatic mapping and left axillary sentinel lymph node biopsy x2.    Surgery date:  2/15/2023     Discharge Date:  2/15/2023    Date of Post-op Call:   2/16/2023       Immediate Concerns:     Plan for breast reduction on 2/24 with Dr. Arnold. She had questions regarding how deep Dr. Diggs went in surgery, etc. Explained to Philippe writer did not have this information available. Also discussed that pathology may not be back by 2/24. This was upsetting to Philippe and emotional support was provided. Recommended she reach out to Dr. Arnold and recommend he contact Dr. Diggs to address questions so that it can be determined if she should move forward with her elective surgery plan.      Pain:  No concerns with pain management. Minimal pain.   Using pain medications as recommended with appropriate relief.   Discussed using medication only as needed. Not scheduled.      Incision:   No concerns, healing well, no redness, drainage or edema reported.      Activity:   No difficulty with ADLs reported.   Patient is up independently at home.   Encourage patient to continue to stay active.      Post op/follow up plans:      Post op appointment scheduled,confirmed date and time with patient. Contact number provided for any additional questions or concerns.       Rebeca Judd, RNCC  [facility]  Surgical Oncology       Future Appointments:       Future Appointments   Date Time Provider Department Center   3/3/2023  9:00 AM Noman Diggs MD [facility]   3/14/2023  1:00 PM [facility] LAB DRAW [facility]   3/14/2023  1:30 PM Marc Judd MD [facility]   3/27/2023  4:00 PM Adrián Dempsey MD [facility] CS

## 2023-02-20 ENCOUNTER — PATIENT OUTREACH (OUTPATIENT)
Dept: CARE COORDINATION | Facility: CLINIC | Age: 53
End: 2023-02-20

## 2023-02-20 ENCOUNTER — PATIENT OUTREACH (OUTPATIENT)
Dept: ONCOLOGY | Facility: CLINIC | Age: 53
End: 2023-02-20
Payer: COMMERCIAL

## 2023-02-20 NOTE — PROGRESS NOTES
Patient called with update from her outside OB GYN visit at [facility] with Dr Malik. She has increasing size and vaginal bleeding requiring surgery. Patient would like to know when to time the surgery and would prefer use of the daVinci for surgery requesting Dr Lion as she uses the daVinci. Will discuss with Dr Judd and patient is post surgery seeing him 3/14/2023.   Answered all patient's questions and verbalized understanding. Daria Ortega RN, BSN.    Writer spoke to Dr Judd for a plan with when a hysterectomy should be done. With heavy menses patient should have a consult with her choice to see Dr Lion and a referral was placed for the consult. Patient has a follow up with Dr Judd March 12, 2023.  Answered all patient's questions and verbalized understanding. Daria Ortega RN, BSN.

## 2023-02-20 NOTE — PROGRESS NOTES
Social Work Intervention  [facility]    Data/Intervention:    Patient Name:  Philippe Medellin  /Age:  1970 (52 year old)    Visit Type: telephone  Referral Source: [facility]  Reason for Referral:  Resources    Collaborated With:    -Patient    Psychosocial Information/Concerns:  Patient requested SW outreach for support resources.    Intervention/Education/Resources Provided:  SW called patient, introduced self and explained the reason for calling. Patient reported that they were looking for supportive resources for their son, who is 21 years old. Patient reported that their son has a good understanding of their diagnosis and the treatment, but wonders if they are needing more support or would benefit from speaking with someone. SW discussed resources available for children who have parents going through cancer. Although these resources typically focus on younger children still may be beneficial.     Assessment/Plan:  SW encouraged patient to review resources discussed today. SW also inquired if the clinic that patients child goes to has a SW available. If available encouraged connection. SW will continue to remain available as needed. Provided patient/family with contact information and availability.    JULIAN Arriaga,LGYISEL  Hematology/Oncology Social Worker  Phone:[phone] Pager: [phone]

## 2023-02-21 ENCOUNTER — PATIENT OUTREACH (OUTPATIENT)
Dept: ONCOLOGY | Facility: CLINIC | Age: 53
End: 2023-02-21
Payer: COMMERCIAL

## 2023-02-21 DIAGNOSIS — C50.412 MALIGNANT NEOPLASM OF UPPER-OUTER QUADRANT OF LEFT BREAST IN FEMALE, ESTROGEN RECEPTOR POSITIVE (H): Primary | ICD-10-CM

## 2023-02-21 DIAGNOSIS — Z17.0 MALIGNANT NEOPLASM OF UPPER-OUTER QUADRANT OF LEFT BREAST IN FEMALE, ESTROGEN RECEPTOR POSITIVE (H): Primary | ICD-10-CM

## 2023-02-21 NOTE — PROGRESS NOTES
"New Patient Hematology / Oncology Nurse Navigator Note     Referral Date: 2/21/23    Referring provider: Dr. Judd    Referring Clinic/Organization: [facility]     Referred to: GynOnc    Requested provider (if applicable): Dr. Lion    Evaluation for : Surgical consult, vaginal bleeding and h/o breast cancer.     Clinical History (per Nurse review of records provided):      Nursing note 2/20:    \"Patient called with update from her outside OB GYN visit at [facility] with Dr Malik. She has increasing size and vaginal bleeding requiring surgery. Patient would like to know when to time the surgery and would prefer use of the daVinci for surgery requesting Dr Lion as she uses the daVinci. Will discuss with Dr Judd and patient is post surgery seeing him 3/14/2023.\" -- BOOKMARKED    Pt sent some records from OBGYN via [facility]:      Records Needed:   Need complete records from [facility] OBGYN. Will ask intake team to obtain relevant OBGYN records then follow-up accordingly (labs, imaging, office notes, any path)    Referral updates and Plan:   OUTGOING CALL to pt:  Introduced my role as nurse navigator with [facility] Hematology/Oncology dept and that we have recd the referral for surgical consult with Dr. Lion from Dr Judd.  Pt confirms they are aware of the referral and ready to schedule  Provided contact information if future questions arise.     -Transferred pt to NPS line [phone] to schedule appt per scheduling instructions. Hold Dr. Lion 3/2 11AM @ [facility] pending review of records.    Explained to patient we are still working to obtain complete medical records from OBGYn and she may receive a call from our intake team regarding her records. Will need all records prior to consult with Dr. Lion.     Liane Lemus, BSN, RN, PHN, OCN  Hematology/Oncology Nurse Navigator  [facility]  [phone]      "

## 2023-02-22 ENCOUNTER — TRANSFERRED RECORDS (OUTPATIENT)
Dept: HEALTH INFORMATION MANAGEMENT | Facility: CLINIC | Age: 53
End: 2023-02-22
Payer: COMMERCIAL

## 2023-02-22 ENCOUNTER — TELEPHONE (OUTPATIENT)
Dept: ONCOLOGY | Facility: CLINIC | Age: 53
End: 2023-02-22
Payer: COMMERCIAL

## 2023-02-22 DIAGNOSIS — Z17.0 MALIGNANT NEOPLASM OF UPPER-OUTER QUADRANT OF LEFT BREAST IN FEMALE, ESTROGEN RECEPTOR POSITIVE (H): Primary | ICD-10-CM

## 2023-02-22 DIAGNOSIS — C50.412 MALIGNANT NEOPLASM OF UPPER-OUTER QUADRANT OF LEFT BREAST IN FEMALE, ESTROGEN RECEPTOR POSITIVE (H): Primary | ICD-10-CM

## 2023-02-22 LAB
PATH REPORT.ADDENDUM SPEC: ABNORMAL
PATH REPORT.COMMENTS IMP SPEC: ABNORMAL
PATH REPORT.COMMENTS IMP SPEC: ABNORMAL
PATH REPORT.COMMENTS IMP SPEC: YES
PATH REPORT.FINAL DX SPEC: ABNORMAL
PATH REPORT.GROSS SPEC: ABNORMAL
PATH REPORT.MICROSCOPIC SPEC OTHER STN: ABNORMAL
PATH REPORT.MICROSCOPIC SPEC OTHER STN: ABNORMAL
PATH REPORT.RELEVANT HX SPEC: ABNORMAL
PATHOLOGY SYNOPTIC REPORT: ABNORMAL
PHOTO IMAGE: ABNORMAL

## 2023-02-23 NOTE — TELEPHONE ENCOUNTER
Philippe sent via text this evening regarding her upcoming plastic surgery.  She is planning on having a breast reduction following the lumpectomy performed by Dr. Diggs.  The plastic surgeon is Dr. Marc Lemos.  She says that she did talk with Dr. Diggs about the plastic surgery which would be performed prior to postlumpectomy radiation.    I went over the results of the pathology showing a T1 a N0 MX invasive ductal carcinoma 2.5 mm in size with negative margins for the invasive cancer and the DCIS.  I discussed with her that the HER2 IHC came back as negative today.  I discussed that there would be no recommendation of chemotherapy and that we would not send an Oncotype on a T1a tumor.  I discussed that there are some concerns given that the estrogen receptor staining was weak 60%.  WA was strong.      I discussed that we would recommend adjuvant hormonal therapy according to the soft and text approach of goserelin every 28 days and exemestane, continued for 5 years and then we would recommend additional hormonal therapy for 2-1/2 to 5 years thereafter.  I discussed the data supporting this approach.    I did recommend that she consult with Dr. Nehemiah Rodrigues from radiation oncology prior to having plastic surgery so that the cosmetic results can be discussed from both plastic surgery and radiation oncology perspective.    She also has been having significant vaginal bleeding and is planning on having hysterectomy.  I recommended that an oophorectomy could be performed at the same time and this would remove the need for good Serlin shots.    I did recommend continuing with 150 minutes of exercise a week which she is already doing and a healthful lifestyle along with stretch band hand weights and yoga calcium and vitamin D.    All her questions were answered.  We will see her in follow-up in clinic after she has had her surgeries.     MD MILA Chambers. LEFT BREAST, SEED-LOCALIZED LUMPECTOMY:  - INVASIVE DUCTAL  CARCINOMA, Grover grade 2, measuring 2.5 mm in greatest extent.  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  - Margins are negative for invasive carcinoma; closest uninvolved anterior margin is 6 mm.  - Margins are negative for DCIS; closest uninvolved inferior and medial margins are 1.5 mm.  - AJCC pathologic staging is pT1a pN0(sn).  - Biopsy marker and biopsy site changes present.  - Fibrocystic change and columnar cell change with associated calcifications.  - Invasive carcinoma is ER (60%, weak) and CO (30%, moderate) positive, HER2 IHC is pending and the result will be reported in an addendum.     B. LEFT BREAST, NEW ANTERIOR MARGIN, EXCISION:  - Benign breast tissue with fibrocystic change, sclerosis adenosis and associated calcifications.  - Negative for atypia or malignancy.     C. LEFT BREAST, NEW LATERAL MARGIN, EXCISION:  - Benign breast tissue with fibrocystic change.  - Negative for atypia or malignancy.     D. LEFT BREAST, NEW MEDIAL MARGIN, EXCISION:  - Benign breast tissue with fibrocystic change, columnar cell change and associated calcifications.  - Negative for atypia or malignancy.     E. SENTINEL LYMPH NODE, LEFT AXILLA, BIOPSY #1:  - One lymph node, negative for malignancy (0/1).     F. SENTINEL LYMPH NODE, LEFT AXILLA, BIOPSY #2:  - One lymph node, negative for malignancy (0/1).

## 2023-02-24 ENCOUNTER — TRANSFERRED RECORDS (OUTPATIENT)
Dept: HEALTH INFORMATION MANAGEMENT | Facility: CLINIC | Age: 53
End: 2023-02-24
Payer: COMMERCIAL

## 2023-02-24 ENCOUNTER — LAB REQUISITION (OUTPATIENT)
Dept: LAB | Facility: CLINIC | Age: 53
End: 2023-02-24
Payer: COMMERCIAL

## 2023-02-24 PROCEDURE — 88305 TISSUE EXAM BY PATHOLOGIST: CPT | Mod: 26 | Performed by: PATHOLOGY

## 2023-02-24 PROCEDURE — 88305 TISSUE EXAM BY PATHOLOGIST: CPT | Mod: TC,ORL | Performed by: PLASTIC SURGERY

## 2023-02-24 PROCEDURE — 88341 IMHCHEM/IMCYTCHM EA ADD ANTB: CPT | Mod: 26 | Performed by: PATHOLOGY

## 2023-02-24 PROCEDURE — 88342 IMHCHEM/IMCYTCHM 1ST ANTB: CPT | Mod: 26 | Performed by: PATHOLOGY

## 2023-02-28 LAB
PATH REPORT.COMMENTS IMP SPEC: NORMAL
PATH REPORT.COMMENTS IMP SPEC: NORMAL
PATH REPORT.FINAL DX SPEC: NORMAL
PATH REPORT.GROSS SPEC: NORMAL
PATH REPORT.MICROSCOPIC SPEC OTHER STN: NORMAL
PATH REPORT.RELEVANT HX SPEC: NORMAL
PHOTO IMAGE: NORMAL

## 2023-03-02 ENCOUNTER — ONCOLOGY VISIT (OUTPATIENT)
Dept: ONCOLOGY | Facility: CLINIC | Age: 53
End: 2023-03-02
Attending: INTERNAL MEDICINE
Payer: COMMERCIAL

## 2023-03-02 VITALS
RESPIRATION RATE: 18 BRPM | BODY MASS INDEX: 27.27 KG/M2 | HEART RATE: 86 BPM | WEIGHT: 163.7 LBS | TEMPERATURE: 98.6 F | HEIGHT: 65 IN | OXYGEN SATURATION: 100 % | DIASTOLIC BLOOD PRESSURE: 83 MMHG | SYSTOLIC BLOOD PRESSURE: 121 MMHG

## 2023-03-02 DIAGNOSIS — C50.412 MALIGNANT NEOPLASM OF UPPER-OUTER QUADRANT OF LEFT BREAST IN FEMALE, ESTROGEN RECEPTOR POSITIVE (H): ICD-10-CM

## 2023-03-02 DIAGNOSIS — N93.8 DYSFUNCTIONAL UTERINE BLEEDING: Primary | ICD-10-CM

## 2023-03-02 DIAGNOSIS — Z17.0 MALIGNANT NEOPLASM OF UPPER-OUTER QUADRANT OF LEFT BREAST IN FEMALE, ESTROGEN RECEPTOR POSITIVE (H): ICD-10-CM

## 2023-03-02 PROCEDURE — G0463 HOSPITAL OUTPT CLINIC VISIT: HCPCS | Performed by: OBSTETRICS & GYNECOLOGY

## 2023-03-02 PROCEDURE — 99205 OFFICE O/P NEW HI 60 MIN: CPT | Performed by: OBSTETRICS & GYNECOLOGY

## 2023-03-02 ASSESSMENT — PAIN SCALES - GENERAL: PAINLEVEL: NO PAIN (0)

## 2023-03-02 NOTE — NURSING NOTE
"Oncology Rooming Note    March 2, 2023 11:23 AM   Philippe Medellin is a 52 year old female who presents for:    Chief Complaint   Patient presents with     Oncology Clinic Visit     NEW - BREAST CANCER; ENLARGING UTERINE FIBROIDS     Initial Vitals: /83 (BP Location: Right arm, Patient Position: Sitting, Cuff Size: Adult Regular)   Pulse 86   Temp 98.6  F (37  C) (Oral)   Resp 18   Ht 1.646 m (5' 4.8\")   Wt 74.3 kg (163 lb 11.2 oz)   LMP 02/07/2023   SpO2 100%   BMI 27.41 kg/m   Estimated body mass index is 27.41 kg/m  as calculated from the following:    Height as of this encounter: 1.646 m (5' 4.8\").    Weight as of this encounter: 74.3 kg (163 lb 11.2 oz). Body surface area is 1.84 meters squared.  No Pain (0) Comment: Data Unavailable   Patient's last menstrual period was 02/07/2023.  Allergies reviewed: Yes  Medications reviewed: Yes    Medications: Medication refills not needed today.  Pharmacy name entered into EPIC:    [id number] [address]    Clinical concerns: Surgical options.       Trang Farrell CMA            "

## 2023-03-02 NOTE — LETTER
3/2/2023         RE: Philippe Medellin  [address]  [facility] [address]        Dear Colleague,    Thank you for referring your patient, Philippe Medellin, to the [facility]. Please see a copy of my visit note below.    GYNECOLOGIC  ONCOLOGY CONSULT    Referring provider:    aMrc Judd MD  [address] [facility] [address]  [place] [address]   RE: Philippe Medellin  : 1970  CLARIBEL: 3/2/2023    CC: T1 a NO MX invasive ductal carcinoma    HPI: Ms Philippe Medellin is a 52 year old female who presents for consultation regarding invasive ductal carcinoma of the left breast, estrogen receptor positive. She presents to discuss surgical options for ovarian suppression.    Today she has questions about the potential side effects of pre-menopausal oophorectomy versus hormonal ovarian suppression. There are many changes current ongoing since the recent diagnosis. Denies any current hot flashes. No family history of ovarian cancer.       2/15/23   Surgery: Seed localized left lumpectomy, SNL left axillary   Path: Invasive ductal carcinoma grade 2, extensive DCIS, margins negative  ER (+60%), ID (+30%), HER2 negtive    23 Consultation with Medical Oncology: discussed adjuvant hormone therapy as ER is weak positive. Consideration of goserlin for ovarian suppression and aromatase inhibitor alternatively performing oophorectomy would remove need for medical hormonal suppression  Under consideration for adjuvant radiation therapy ( consult with Dr. 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,  possibly mosaic- not considered a pathogenic variant    OBGYN history and Health Maintenance:    Last Pap Smear:  Neg/HPV negative        Review of Systems:  Systemic:No weight changes.    Skin : No skin changes or new lesions.   Eye : No changes in vision.   Pulmonary: No cough or SOB.   Cardiovascular: No CP or palpitations  Gastrointestinal : No diarrhea, constipation, abdominal pain. Bowel habits normal.   Genitourinary: No dysuria, urgency or bleeding  Psychiatric: No depression or anxiety  Hematologic : No palpable lymph nodes.   Endocrine : No hot flashes. No heat/cold intolerance.      Neurological: No headaches, no numbness.     Past Medical History:   Diagnosis Date    ADD (attention deficit disorder)     diagnosed      Allergies     had allergy testing as a child    Asthma     Breast cancer (H) 2023    Migraine        Past Surgical History:   Procedure Laterality Date    DENTAL SURGERY      INSERT INTRAUTERINE DEVICE      LUMPECTOMY BREAST, SEED LOCALIZATION, SENTINEL NODE Left 2/15/2023    Procedure: LUMPECTOMY, BREAST, WITH RADIOACTIVE SEED LOCALIZATION AND SENTINEL LYMPH NODE BIOPSY;  Surgeon: Noman Diggs MD;  Location: [facility]          Current Outpatient Medications   Medication Sig Dispense Refill    acetaminophen (TYLENOL) 325 MG tablet Take 2 tablets (650 mg) by mouth every 4 hours as needed for mild pain 50 tablet 0    ammonium lactate (AMLACTIN) 12 % external cream Apply 1 Application topically      Bioflavonoid Products (VITAMIN C) CHEW Take by mouth daily (with lunch)      cetirizine (ZYRTEC) 10 MG tablet Take 1 tablet by mouth every evening      cyclobenzaprine (FLEXERIL) 10 MG tablet Take 1 tablet by mouth every evening      EPINEPHrine (ANY BX GENERIC EQUIV) 0.3 MG/0.3ML injection 2-pack Inject 0.3 mg into the muscle as needed for anaphylaxis May repeat one time in 5-15 minutes if response to initial dose is inadequate.      fremanezumab-vfrm (AJOVY) SOSY subcutaneous every  30 days Next dose 2/12/23      ketotifen (ZADITOR) 0.025 % ophthalmic solution 1 drop into affected eye      methylphenidate (CONCERTA) 36 MG CR tablet 2 tablets      methylphenidate (RITALIN) 20 MG tablet Take 20 mg by mouth every morning      Multiple Vitamins-Minerals (EYE VITAMINS PO) Take by mouth daily (with lunch)      MULTIVITAMINS OR Take by mouth daily (with lunch)      oxyCODONE (ROXICODONE) 5 MG tablet Take 1-2 tablets (5-10 mg) by mouth every 4 hours as needed for moderate to severe pain 6 tablet 0    PROAIR  (90 BASE) MCG/ACT IN AERS Inhale into the lungs as needed      SUDAFED 30 MG OR TABS Take by mouth as needed      SUMAtriptan (IMITREX) 100 MG tablet Take 1 tablet by mouth at onset of headache      SUMAtriptan (IMITREX) 20 MG/ACT nasal spray Spray 1 spray in nostril as needed      tretinoin (RETIN-A) 0.025 % external cream 1 application in the evening to face      UNABLE TO FIND daily (with lunch) MEDICATION NAME: Turkey Tail      Vitamin D (Cholecalciferol) 25 MCG (1000 UT) TABS Take by mouth daily (with lunch)      ZOLMitriptan (ZOMIG-ZMT) 5 MG ODT Take 5 mg by mouth as needed           Allergies   Allergen Reactions    Bee Pollen Anaphylaxis     Other reaction(s): anaphylaxis  BEE Venom      Dog Epithelium Difficulty breathing and Shortness Of Breath     Other reaction(s): difficulty breathing    Walnuts [Nuts] Hives    Doxycycline Rash    Sulfa Drugs Rash    Sulfamethoxazole W/Trimethoprim      Other reaction(s): Unknown    Wasp Venom      Other reaction(s): Unknown       Social History:  Social History     Tobacco Use    Smoking status: Never    Smokeless tobacco: Never    Tobacco comments:     no smokers in the household   Substance Use Topics    Alcohol use: Not Currently       Family History:   The patient's family history is notable for   Family History   Problem Relation Age of Onset    Gastrointestinal Disease Mother         irritable bowel    Skin Cancer Mother     Prostate  "Cancer Father         age 60    Cardiovascular Father         anurysyms    Thyroid Disease Sister         hypothyroid    Thyroid Disease Maternal Grandmother         hypothyroid    Osteoporosis Maternal Grandmother     Cancer Maternal Grandfather         non hodgkins lymphoma    Osteoporosis Paternal Grandmother     Cerebrovascular Disease Paternal Grandmother     Cardiovascular Paternal Grandmother     Venous thrombosis Paternal Grandmother     Prostate Cancer Paternal Grandfather     Allergies Daughter         food    Allergies Son         food    Breast Cancer Maternal Aunt     Breast Cancer Maternal Cousin         1st cousin    Colon Cancer Maternal Uncle     Anesthesia Reaction No family hx of          Physical Exam:     /83 (BP Location: Right arm, Patient Position: Sitting, Cuff Size: Adult Regular)   Pulse 86   Temp 98.6  F (37  C) (Oral)   Resp 18   Ht 1.646 m (5' 4.8\")   Wt 74.3 kg (163 lb 11.2 oz)   LMP 02/07/2023   SpO2 100%   BMI 27.41 kg/m    Body mass index is 27.41 kg/m .    General: Alert and oriented, no acute distress  Psych: Mood stable    Assessment: Philippe RITCHIE Negro Medellin is a 52 year old woman with a diagnosis of T1 a NO MX invasive ductal carcinoma, ER positive.   Pre-menopausal status, planning for aromatase inhibitor treatment    We discussed at length the plan for ovarian suppression versus bilateral salpingo-oophorectomy. All of her questions were addressed.    At this time she agrees to try medical ovarian suppression with Goserlin. This will give her a clearer understanding of menopausal symptoms. She would like to get further out from surgery and radiation before undergoing another surgery.    She will follow up in May/2023 with Pelvic US and will discuss role of surgery at that time.      Kirstin Lion M.D., MPH,  F.A.C.O.G.  Professor  Department of Ob/Gyn and Women's Health  Division of Gynecologic Oncology  [facility] [place]  [phone]      Time: total time spent today, 3/2/23, including preparation, review of outside records, face to face counseling, and documentation was 60 minutes.

## 2023-03-02 NOTE — PROGRESS NOTES
GYNECOLOGIC  ONCOLOGY CONSULT    Referring provider:    Marc Judd MD  [address] [facility]  [place] [address]   RE: Philippe Medellin  : 1970  CLARIBEL: 3/2/2023    CC: T1 a NO MX invasive ductal carcinoma    HPI: Ms Philippe Medellin is a 52 year old female who presents for consultation regarding invasive ductal carcinoma of the left breast, estrogen receptor positive. She presents to discuss surgical options for ovarian suppression.    Today she has questions about the potential side effects of pre-menopausal oophorectomy versus hormonal ovarian suppression. There are many changes current ongoing since the recent diagnosis. Denies any current hot flashes. No family history of ovarian cancer.       2/15/23   Surgery: Seed localized left lumpectomy, SNL left axillary   Path: Invasive ductal carcinoma grade 2, extensive DCIS, margins negative  ER (+60%), SD (+30%), HER2 negtive    23 Consultation with Medical Oncology: discussed adjuvant hormone therapy as ER is weak positive. Consideration of goserlin for ovarian suppression and aromatase inhibitor alternatively performing oophorectomy would remove need for medical hormonal suppression  Under consideration for adjuvant radiation therapy ( consult with Dr. 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, possibly mosaic- not considered a pathogenic variant    OBGYN history and Health Maintenance:    Last Pap Smear:  Neg/HPV negative        Review of Systems:  Systemic:No weight changes.    Skin : No skin changes or new lesions.   Eye : No changes in vision.    Pulmonary: No cough or SOB.   Cardiovascular: No CP or palpitations  Gastrointestinal : No diarrhea, constipation, abdominal pain. Bowel habits normal.   Genitourinary: No dysuria, urgency or bleeding  Psychiatric: No depression or anxiety  Hematologic : No palpable lymph nodes.   Endocrine : No hot flashes. No heat/cold intolerance.      Neurological: No headaches, no numbness.     Past Medical History:   Diagnosis Date     ADD (attention deficit disorder)     diagnosed 1998      Allergies     had allergy testing as a child     Asthma      Breast cancer (H) 01/2023     Migraine        Past Surgical History:   Procedure Laterality Date     DENTAL SURGERY       INSERT INTRAUTERINE DEVICE       LUMPECTOMY BREAST, SEED LOCALIZATION, SENTINEL NODE Left 2/15/2023    Procedure: LUMPECTOMY, BREAST, WITH RADIOACTIVE SEED LOCALIZATION AND SENTINEL LYMPH NODE BIOPSY;  Surgeon: Noman Diggs MD;  Location:  OR          Current Outpatient Medications   Medication Sig Dispense Refill     acetaminophen (TYLENOL) 325 MG tablet Take 2 tablets (650 mg) by mouth every 4 hours as needed for mild pain 50 tablet 0     ammonium lactate (AMLACTIN) 12 % external cream Apply 1 Application topically       Bioflavonoid Products (VITAMIN C) CHEW Take by mouth daily (with lunch)       cetirizine (ZYRTEC) 10 MG tablet Take 1 tablet by mouth every evening       cyclobenzaprine (FLEXERIL) 10 MG tablet Take 1 tablet by mouth every evening       EPINEPHrine (ANY BX GENERIC EQUIV) 0.3 MG/0.3ML injection 2-pack Inject 0.3 mg into the muscle as needed for anaphylaxis May repeat one time in 5-15 minutes if response to initial dose is inadequate.       fremanezumab-vfrm (AJOVY) SOSY subcutaneous every 30 days Next dose 2/12/23       ketotifen (ZADITOR) 0.025 % ophthalmic solution 1 drop into affected eye       methylphenidate (CONCERTA) 36 MG CR tablet 2 tablets       methylphenidate (RITALIN) 20 MG tablet Take 20 mg by mouth every morning        Multiple Vitamins-Minerals (EYE VITAMINS PO) Take by mouth daily (with lunch)       MULTIVITAMINS OR Take by mouth daily (with lunch)       oxyCODONE (ROXICODONE) 5 MG tablet Take 1-2 tablets (5-10 mg) by mouth every 4 hours as needed for moderate to severe pain 6 tablet 0     PROAIR  (90 BASE) MCG/ACT IN AERS Inhale into the lungs as needed       SUDAFED 30 MG OR TABS Take by mouth as needed       SUMAtriptan (IMITREX) 100 MG tablet Take 1 tablet by mouth at onset of headache       SUMAtriptan (IMITREX) 20 MG/ACT nasal spray Spray 1 spray in nostril as needed       tretinoin (RETIN-A) 0.025 % external cream 1 application in the evening to face       UNABLE TO FIND daily (with lunch) MEDICATION NAME: Turkey Tail       Vitamin D (Cholecalciferol) 25 MCG (1000 UT) TABS Take by mouth daily (with lunch)       ZOLMitriptan (ZOMIG-ZMT) 5 MG ODT Take 5 mg by mouth as needed           Allergies   Allergen Reactions     Bee Pollen Anaphylaxis     Other reaction(s): anaphylaxis  BEE Venom       Dog Epithelium Difficulty breathing and Shortness Of Breath     Other reaction(s): difficulty breathing     Walnuts [Nuts] Hives     Doxycycline Rash     Sulfa Drugs Rash     Sulfamethoxazole W/Trimethoprim      Other reaction(s): Unknown     Wasp Venom      Other reaction(s): Unknown       Social History:  Social History     Tobacco Use     Smoking status: Never     Smokeless tobacco: Never     Tobacco comments:     no smokers in the household   Substance Use Topics     Alcohol use: Not Currently       Family History:   The patient's family history is notable for   Family History   Problem Relation Age of Onset     Gastrointestinal Disease Mother         irritable bowel     Skin Cancer Mother      Prostate Cancer Father         age 60     Cardiovascular Father         anurysyms     Thyroid Disease Sister         hypothyroid     Thyroid Disease Maternal Grandmother         hypothyroid     Osteoporosis Maternal Grandmother       "Cancer Maternal Grandfather         non hodgkins lymphoma     Osteoporosis Paternal Grandmother      Cerebrovascular Disease Paternal Grandmother      Cardiovascular Paternal Grandmother      Venous thrombosis Paternal Grandmother      Prostate Cancer Paternal Grandfather      Allergies Daughter         food     Allergies Son         food     Breast Cancer Maternal Aunt      Breast Cancer Maternal Cousin         1st cousin     Colon Cancer Maternal Uncle      Anesthesia Reaction No family hx of          Physical Exam:     /83 (BP Location: Right arm, Patient Position: Sitting, Cuff Size: Adult Regular)   Pulse 86   Temp 98.6  F (37  C) (Oral)   Resp 18   Ht 1.646 m (5' 4.8\")   Wt 74.3 kg (163 lb 11.2 oz)   LMP 02/07/2023   SpO2 100%   BMI 27.41 kg/m    Body mass index is 27.41 kg/m .    General: Alert and oriented, no acute distress  Psych: Mood stable    Assessment: Philippe RITCHIE Negro Medellin is a 52 year old woman with a diagnosis of T1 a NO MX invasive ductal carcinoma, ER positive.   Pre-menopausal status, planning for aromatase inhibitor treatment    We discussed at length the plan for ovarian suppression versus bilateral salpingo-oophorectomy. All of her questions were addressed.    At this time she agrees to try medical ovarian suppression with Goserlin. This will give her a clearer understanding of menopausal symptoms. She would like to get further out from surgery and radiation before undergoing another surgery.    She will follow up in May/2023 with Pelvic US and will discuss role of surgery at that time.      Kirstin Lion M.D., MPH,  F.A.C.O.G.  Professor  Department of Ob/Gyn and Women's Health  Division of Gynecologic Oncology  [facility] [facility]  [phone]      Time: total time spent today, 3/2/23, including preparation, review of outside records, face to face counseling, and documentation was 60 minutes.       "

## 2023-03-09 ENCOUNTER — ONCOLOGY VISIT (OUTPATIENT)
Dept: ONCOLOGY | Facility: CLINIC | Age: 53
End: 2023-03-09
Attending: SURGERY
Payer: COMMERCIAL

## 2023-03-09 VITALS
SYSTOLIC BLOOD PRESSURE: 117 MMHG | OXYGEN SATURATION: 100 % | HEART RATE: 87 BPM | DIASTOLIC BLOOD PRESSURE: 76 MMHG | RESPIRATION RATE: 16 BRPM | WEIGHT: 165.2 LBS | TEMPERATURE: 97.8 F | BODY MASS INDEX: 27.66 KG/M2

## 2023-03-09 DIAGNOSIS — C50.412 MALIGNANT NEOPLASM OF UPPER-OUTER QUADRANT OF LEFT BREAST IN FEMALE, ESTROGEN RECEPTOR POSITIVE (H): Primary | ICD-10-CM

## 2023-03-09 DIAGNOSIS — Z17.0 MALIGNANT NEOPLASM OF UPPER-OUTER QUADRANT OF LEFT BREAST IN FEMALE, ESTROGEN RECEPTOR POSITIVE (H): Primary | ICD-10-CM

## 2023-03-09 ASSESSMENT — PAIN SCALES - GENERAL: PAINLEVEL: MILD PAIN (3)

## 2023-03-09 NOTE — PROGRESS NOTES
HISTORY OF PRESENT ILLNESS:  Philippe Medellin is here for a postoperative visit after undergoing a lumpectomy for sentinel node biopsy for DCIS.  Her final pathology revealed a 2.5 mm invasive cancer and 20 mm of DCIS.  All of her margins were negative.  Her sentinel node was negative.  She subsequently underwent bilateral breast reduction surgery.  There was no disease in that specimen.  She has seen Dr. Judd, who has recommended endocrine therapy alone.  She has an appointment later this month with Radiation Oncology.  I will see her in the future if any problems arise.

## 2023-03-09 NOTE — LETTER
3/9/2023         RE: Philippe Medellin  [address] [facility] [address]        Dear Colleague,    Thank you for referring your patient, Philippe Medellin, to the [facility] BREAST [facility]. Please see a copy of my visit note below.    HISTORY OF PRESENT ILLNESS:  Philippe Medellin is here for a postoperative visit after undergoing a lumpectomy for sentinel node biopsy for DCIS.  Her final pathology revealed a 2.5 mm invasive cancer and 20 mm of DCIS.  All of her margins were negative.  Her sentinel node was negative.  She subsequently underwent bilateral breast reduction surgery.  There was no disease in that specimen.  She has seen Dr. Judd, who has recommended endocrine therapy alone.  She has an appointment later this month with Radiation Oncology.  I will see her in the future if any problems arise.      Noman Diggs MD

## 2023-03-09 NOTE — NURSING NOTE
"Oncology Rooming Note    March 9, 2023 9:57 AM   Philippe Medellin is a 52 year old female who presents for:    Chief Complaint   Patient presents with     Oncology Clinic Visit     Malignant neoplasm of upper-outer quadrant of left breast in female, estrogen receptor positive (H)     Initial Vitals: /76 (BP Location: Right arm, Patient Position: Sitting, Cuff Size: Adult Regular)   Pulse 87   Temp 97.8  F (36.6  C) (Oral)   Resp 16   Wt 74.9 kg (165 lb 3.2 oz)   LMP 02/07/2023   SpO2 100%   BMI 27.66 kg/m   Estimated body mass index is 27.66 kg/m  as calculated from the following:    Height as of 3/2/23: 1.646 m (5' 4.8\").    Weight as of this encounter: 74.9 kg (165 lb 3.2 oz). Body surface area is 1.85 meters squared.  Mild Pain (3) Comment: Data Unavailable   Patient's last menstrual period was 02/07/2023.  Allergies reviewed: Yes  Medications reviewed: Yes    Medications: Medication refills not needed today.  Pharmacy name entered into EPIC:    [id number] [address]    Clinical concerns: No additional clinical concerns.        Linette Damico), LPN March 9, 2023 9:58 AM              "

## 2023-03-11 NOTE — PROGRESS NOTES
MEDICAL ONCOLOGY FOLLOW UP NOTE     Philippe Tom  Female, 52 year old, 1970  MRN:   [id number]          Dear Dr. Diggs,     Thank you for referring Philippe Tom regarding a newly diagnosed DCIS with a microinvasive component.     PROBLEM: New diagnosis of left sided DCIS with invasive component     HISTORY OF PRESENT ILLNESS: Ms. Tom is a 52 year old woman from [place], [place] with a new diagnosis of left breast Nuclear Grade 3 ER+(15-20%) DCIS with less than 1 mm of invasive disease as part of workup following routine screening mammogram that detected calcifications. She has not undergone surgical management as of yet, and is establishing care at the [facility] with Medical Oncology, and will be seeing Dr. Noman Diggs for surgical consultation on 1/27/2023.      Her oncologic diagnosis came to light after routine screening mammogram on 12/27/2022 demonstrated indeterminate grouped versus clustered fine microcalcifcations at the 2 o'clock position of the left breast at mid depth. Of note, she does have a history of previous breast biopsies in the right breast on 12/10/2015 that detected fibroadenomas at the 8 o'clock position, 5 cm from the nipple measuring 11 x 6 x 10 mm, and at the 10 o'clock position, 7 cm from the nipple measuring 8 x 4 x 7 mm. Within the 8 o'clock position, rare calcifications were noted adjacent to the fibroadenoma. She also had a diagnostic mammogram for a palpable lump in the left breast on 5/21/2019 at the 2 o'clock position. Focused left breast ultrasound demonstrated an anechoic cyst at the 2 o'clock psition, 8 cm from the nipple measuring 1.7 x 1.2 x 1.5 cm, noted to be benign. An additional anechoic cyst was noted at 1:30 position, 7 cm from the nipple measurign up to 8 mm and routine yearly mammography was recommended.     She underwent stereotactic guided biopsy at [facility] on 1/10/2023 with pathology (Specimen -[id number])  demonstrating Nuclear Grade 3 ER+ (11-20%, weak) DCIS with central comedonecrosis, with less than 1 mm of invasive disease noted within the specimen.      She was seen by Dr. Kelly Garcia for surgical consultation on 2023 who discussed surgical options with the patient. Note unavailable in care everywhere    She saw Medical Genetics at [facility] on 2023, seeing Sasha Chacko. [facility]'s STAT breast panel was sent.      SUMMARY OF OUTSIDE STUDIES:  Patient had bilateral screening mammograms on 22 and indeterminate grouped versus clustered fine microcalcifications at the 2:00 position of the left breast, middle depth. Recommend correlation with a mediolateral view and spot magnification CC and MLO views of the left breast for further evaluation. Diagnostic imaging followed on  and clustered microcalcifications in the upper outer aspect of the left breast are suspicious of malignancy. Recommend stereotactically guided biopsy.      1/10/23 - Left breast, stereotactic needle core biopsy at 2:00:  Biopsy at [facility] - pathology report needed.   Left breast, needle core biopsy at 2:00: Ductal carcinoma in situ with   calcifications and at least microinvasion.    Patient states this was left invasive ductal carcinoma,  ER+    <1mm of invasive -- micro  23 - She did meet with Karley Garcia - consult note needed  Genetic counseling consult scheduled for  at [facility] - consult note needed.        Breast Cancer Risk Factors:   Breast biopsies: 2 breast biopsies on the right breast that demonstrated fibroadenomas  OCPs: duration of 10 years in college  Menarche: around 7th grade, estimated at 15 years old  Gestational history: 3 pregnancies, 2 live births at 30 years and 32 years, 1  at 20 years  Currently using Mirena IUD for significant bleeding from fibroids. Estimated LMP is 2023  No first degree relatives with breast or ovarian  cancer.  She does have a family history of a maternal great aunt and maternal first cousin with breast cancer.     Menstrual history: Menarche age 15.  First pregnancy age 20.  Ended in .  Pregnancies age 30 and 32 with live births.  She has fibroids has a Mirena IUD with light periods.  She did have a history of heavy menstrual bleeding related to the fibroids, before the Mirena IUD was placed.  No history of hormone replacement therapy.     PMH:   Attention deficit disorder  Allergies  Migraines  Fibroids  The patient does not have a history of low bone density, diabetes mellitus, hyperlipidemia, or hypertension     She has no history of breast surgery, breast cancer in the past, radiation therapy in the past, radiation exposure in the past, tumor of any kind, heart problems, heart attack, breathing problems, blood clots, seizures, arthritis, peptic ulcer disease, osteoporosis, bone fractures.  She is not currently participating in a clinical trial.     PSH:  Intrauterine device insertion     MEDICATIONS:  Cetirizine  Cyclobenzaprine  Fremanezumab-vfrm (Ajovy)  Methylphenidate  Sumatriptan  Tretinoin  Adderall  Rimegepant  Epinephrine 0.3 mg/0.3 mL     ALLERGY:  Bee pollen  Walnuts  Doxycycline  Sulfa  Wasps       Dog hair  - She follows with Dr. Rufus Cardenas at [facility]     FAMILY HISTORY:  Mother with skin cancer on the leg at 65 years  Father with prostate cancer at 62 years old, currently alive, no recurrence [id number]. [place] 3+4.  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 The last noted menstrual cycle completed on January 22, 2023.     GENETICS:  NF1 [id number]>C ([id number]) possibly mosaic Uncertain Significance    PATHOLOGY:  A. LEFT BREAST, SEED-LOCALIZED LUMPECTOMY:  - INVASIVE DUCTAL CARCINOMA, [place] grade 2, measuring 2.5 mm in greatest extent.  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  - Margins are negative for invasive carcinoma; closest uninvolved anterior margin is 6 mm.  - Margins are negative for DCIS; closest uninvolved inferior and medial margins are 1.5 mm.  - AJCC pathologic staging is pT1a pN0(sn).  - Biopsy marker and biopsy site changes present.  - Fibrocystic change and columnar cell change with associated calcifications.  - Invasive carcinoma is ER (60%, weak) and WV (30%, moderate) positive, HER2 IHC is pending and the result will be reported in an addendum.     B. LEFT BREAST, NEW ANTERIOR MARGIN, EXCISION:  - Benign breast tissue with fibrocystic change, sclerosis adenosis and associated calcifications.  - Negative for atypia or malignancy.     C. LEFT BREAST, NEW LATERAL MARGIN, EXCISION:  - Benign breast tissue with fibrocystic change.  - Negative for atypia or malignancy.     D. LEFT BREAST, NEW MEDIAL MARGIN, EXCISION:  - Benign breast tissue with fibrocystic change, columnar cell change and associated calcifications.  - Negative for atypia or malignancy.     E. SENTINEL LYMPH NODE, LEFT AXILLA, BIOPSY #1:  - One  lymph node, negative for malignancy (0/1).     F. SENTINEL LYMPH NODE, LEFT AXILLA, BIOPSY #2:  - One lymph node, negative for malignancy (0/1).    TREATMENT HISTORY:  A.  Diagnosis with [id number] invasive ductal cancer of the left breast.  - Invasive carcinoma is ER (60%, weak) and DC (30%, moderate) positive,  HER2 negative 1+  B.  Mirana IUD removed.  C.  Lumpectomy  B.  Bilateral mammoplasty breast reduction.  Follow up with Dr. Arnold.  D.  Radiation planned  E.  Hormonal therapy with tamoxifen planned.        INTERVAL HISTORY:  Philippe returns to clinic after having had breast reduction surgery performed by her plastic surgeon.  She has recovered well from the surgery.  She has some minor pain in both breasts.  She has mild fatigue, no depression, no anxiety.  She does have an area of irritation in the right breast reconstruction at the 6 o'clock position.    A 10-point review of systems is entirely negative.  She is eating a healthful diet and she does not consume alcohol.  She has been exercising at the  and has gone back to doing exercise this past week.  She takes vitamin D and calcium.  On 02/24/2023, she underwent a left reduction mammoplasty and a right reduction mammoplasty and the pathology of both procedures was benign.  The procedure was performed by Dr. Marc Arnold and the left breast reduction mammoplasty showed benign breast tissue with nonproliferative fibrocystic changes, negative for carcinoma or atypical breast proliferative tissue and the right breast reduction mammoplasty showed benign breast tissue with fibrocystic change including usual ductal hyperplasia, negative for carcinoma or atypical breast proliferative disease.    Philippe continues to have regular periods.  Her IUD was removed in February.  She initially had significant bleeding for her period, but her most recent period was quite light.  She has an appointment scheduled with Dr. Kirstin Lion to discuss CLEM-BSO in the  future.     PHYSICAL EXAMINATION:    /77 (BP Location: Right arm)   Pulse 66   Temp 96.8  F (36  C) (Oral)   Resp 16   Wt 75.3 kg (166 lb 1.6 oz)   LMP 02/07/2023   SpO2 99%   BMI 27.81 kg/m    VITAL SIGNS:  As noted.  GENERAL:  Philippe appeared generally well. She has no alopecia.  HEENT:  Examination of the oropharynx is without lesions.  LYMPH:  There is no palpable cervical, supraclavicular, subclavicular or axillary lymphadenopathy.  BREASTS:  Right breast reveals a mammoplasty incision that is well healed without masses.  There is erythema at the T-junction at the 6 o'clock position.  This area of blanching erythema is approximately 1 cm in size, and I did advise her to go back to Dr. Arnold for possible incision infection.  The area was somewhat warm and tender.  On the left breast, there is a reduction mammoplasty incision that is well-healed without erythema or masses.  No signs or symptoms of infection.  There is a well-healed incision at the 3 o'clock position, 2 fingerbreadths from the nipple areolar complex without erythema or masses, and there is a left axillary incision that is well-healed without erythema or masses.  There are no masses in either breast.  LUNGS:  Clear to percussion and auscultation.  HEART:  There is a regular rate and rhythm.  S1, S2.  ABDOMEN:  Soft, nontender, without hepatosplenomegaly.  EXTREMITIES:  Without edema.  PSYCH:  Mood and affect were normal.     ASSESSMENT AND PLAN:   1Michelle Tom is a 52 year old pre-menopausal woman with a recent diagnosis of a left breast  nuclear grade 3 DCIS with a microinvasive component < 1 mm diagnosed from a stereotactic biopsy after routine screening mammogram revealed calcifications. ECOG 0. She has not undergone surgical management, and will meet with Dr. Diggs tomorrow to discuss surgical options. Given that we do not have the full pathology, as will be delineated after surgical management, we discussed in general what  options would be. We also discussed OncotypeDx would only be sent if her invasive component of disease was 5 mm or greater.   2.  Grade 3 DCIS with invasive component.  Resected.  Philippe Medellin returns to clinic after her subsequent bilateral mammoplasty reduction.  I am concerned about a small incision infection at the 6 o'clock position of the right breast mammoplasty incision, and she will go back to Dr. Arnold later today for evaluation of the possible infection.  There is no manuel drainage, but there is blanching erythema and a slight bit of warmth at the 6 o'clock position at the T-junction of the right breast mammoplasty incision.  3.  Discussion of Radiation Oncology consultation.  Philippe will see Dr. Rodrigues for consultation regarding post-lumpectomy radiation for her T1a invasive breast cancer and DCIS.  The appointment is 03/23/2023.  4.  Consideration of hormonal therapy.  I discussed that we will discuss hormonal therapy after radiation is completed.  I discussed that tamoxifen for 5 years would be a reasonable option for her.  This may be an issue, though, with the fibroids and a CLEM-BSO may be a better option for her in the long term.  5.  Discussion of fremanezumab, which is a CGRP inhibitor for migraines, also known as Ajovy.  Philippe asked me whether there might be any interaction between this migraine medicine and breast cancer risk, and I discussed that I am not aware of it, but I could take a look through the literature.  Reviewing this, there is no link to breast cancer risk that I can find.   6.  All of her questions were answered.  7. Menorrhagia secondary to fibroids  Tamoxifen could be an issue. Mirena IUD removed because of link to breast cancer risk.   8.  Recommendations of exercise of 150 minutes/week based on the lace and pathway study.  9. Recommendation of a Mediterranean style diet low in saturated fat but not low in fat with more fruits and vegetables and less red meat.   10.   Recommendation for bone health includes vitamin D3 2000 international units daily and calcium 1 g/day either by diet or reading labels.  We also recommend bone strengthening exercises including stretch band hand weights and yoga.  11. Fatigue / Anxiety  - She currently does have a counselor. Not currently using medication towards this.  12. COVID-19 vaccination  - She has had her last (4th) booster in November 2022.   13.  Migraines, associated with menses  - Using sumatriptan and   14.  Follow up with Dr. Arnold for possible left breast skin infection.  She will call his office today. March 23 will start radiation. Follow up with me May 2 with CBC, CMP.     Thank you for allowing us to participate in this patient's care.     Sincerely,      Marc Judd MD  Professor  [facility]  [phone]        I spent 50 minutes with the patient more than 50% of which was in counseling and coordination of care.

## 2023-03-14 ENCOUNTER — ONCOLOGY VISIT (OUTPATIENT)
Dept: ONCOLOGY | Facility: CLINIC | Age: 53
End: 2023-03-14
Attending: INTERNAL MEDICINE
Payer: COMMERCIAL

## 2023-03-14 ENCOUNTER — APPOINTMENT (OUTPATIENT)
Dept: LAB | Facility: CLINIC | Age: 53
End: 2023-03-14
Attending: INTERNAL MEDICINE
Payer: COMMERCIAL

## 2023-03-14 VITALS
TEMPERATURE: 96.8 F | SYSTOLIC BLOOD PRESSURE: 127 MMHG | BODY MASS INDEX: 27.81 KG/M2 | OXYGEN SATURATION: 99 % | WEIGHT: 166.1 LBS | HEART RATE: 66 BPM | DIASTOLIC BLOOD PRESSURE: 77 MMHG | RESPIRATION RATE: 16 BRPM

## 2023-03-14 DIAGNOSIS — Z17.0 MALIGNANT NEOPLASM OF UPPER-OUTER QUADRANT OF LEFT BREAST IN FEMALE, ESTROGEN RECEPTOR POSITIVE (H): Primary | ICD-10-CM

## 2023-03-14 DIAGNOSIS — C50.412 MALIGNANT NEOPLASM OF UPPER-OUTER QUADRANT OF LEFT BREAST IN FEMALE, ESTROGEN RECEPTOR POSITIVE (H): Primary | ICD-10-CM

## 2023-03-14 PROCEDURE — 99215 OFFICE O/P EST HI 40 MIN: CPT | Performed by: INTERNAL MEDICINE

## 2023-03-14 PROCEDURE — G0463 HOSPITAL OUTPT CLINIC VISIT: HCPCS | Performed by: INTERNAL MEDICINE

## 2023-03-14 ASSESSMENT — PAIN SCALES - GENERAL: PAINLEVEL: MILD PAIN (3)

## 2023-03-14 NOTE — NURSING NOTE
"Oncology Rooming Note    March 14, 2023 1:31 PM   Philippe Medellin is a 52 year old female who presents for:    Chief Complaint   Patient presents with     Oncology Clinic Visit     Breast cancer     Initial Vitals: /77 (BP Location: Right arm)   Pulse 66   Temp 96.8  F (36  C) (Oral)   Resp 16   Wt 75.3 kg (166 lb 1.6 oz)   LMP 02/07/2023   SpO2 99%   BMI 27.81 kg/m   Estimated body mass index is 27.81 kg/m  as calculated from the following:    Height as of 3/2/23: 1.646 m (5' 4.8\").    Weight as of this encounter: 75.3 kg (166 lb 1.6 oz). Body surface area is 1.86 meters squared.  Mild Pain (3) Comment: Data Unavailable   Patient's last menstrual period was 02/07/2023.  Allergies reviewed: Yes  Medications reviewed: Yes    Medications: Medication refills not needed today.  Pharmacy name entered into EPIC:    [id number] - [place], [place] - [address]    Clinical concerns: none       Marylin Garrison CMA            "

## 2023-03-14 NOTE — LETTER
3/14/2023         RE: Philippe Medellin  [address]  [facility] [address]        Dear Colleague,    Thank you for referring your patient, Philippe Medellin, to the [facility]. Please see a copy of my visit note below.    MEDICAL ONCOLOGY FOLLOW UP NOTE     Philippe Tom  Female, 52 year old, 1970  MRN:   [id number]          Dear Dr. Diggs,     Thank you for referring Philippe Tom regarding a newly diagnosed DCIS with a microinvasive component.     PROBLEM: New diagnosis of left sided DCIS with invasive component     HISTORY OF PRESENT ILLNESS: Ms. Tom is a 52 year old woman from [place], [place] with a new diagnosis of left breast Nuclear Grade 3 ER+(15-20%) DCIS with less than 1 mm of invasive disease as part of workup following routine screening mammogram that detected calcifications. She has not undergone surgical management as of yet, and is establishing care at the [facility] with Medical Oncology, and will be seeing Dr. Noman Diggs for surgical consultation on 1/27/2023.      Her oncologic diagnosis came to light after routine screening mammogram on 12/27/2022 demonstrated indeterminate grouped versus clustered fine microcalcifcations at the 2 o'clock position of the left breast at mid depth. Of note, she does have a history of previous breast biopsies in the right breast on 12/10/2015 that detected fibroadenomas at the 8 o'clock position, 5 cm from the nipple measuring 11 x 6 x 10 mm, and at the 10 o'clock position, 7 cm from the nipple measuring 8 x 4 x 7 mm. Within the 8 o'clock position, rare calcifications were noted adjacent to the fibroadenoma. She also had a diagnostic mammogram for a palpable lump in the left breast on 5/21/2019 at the 2 o'clock position. Focused left breast ultrasound demonstrated an anechoic cyst at the 2 o'clock psition, 8 cm from the nipple measuring 1.7 x 1.2 x 1.5 cm, noted to be benign. An  additional anechoic cyst was noted at 1:30 position, 7 cm from the nipple measurign up to 8 mm and routine yearly mammography was recommended.     She underwent stereotactic guided biopsy at [facility] on 1/10/2023 with pathology (Specimen -[id number]) demonstrating Nuclear Grade 3 ER+ (11-20%, weak) DCIS with central comedonecrosis, with less than 1 mm of invasive disease noted within the specimen.      She was seen by Dr. Kelly Garcia for surgical consultation on 1/19/2023 who discussed surgical options with the patient. Note unavailable in care everywhere    She saw Medical Genetics at [facility] on 1/25/2023, seeing Sasha Chacko. [facility]s STAT breast panel was sent.      SUMMARY OF OUTSIDE STUDIES:  Patient had bilateral screening mammograms on 12/27/22 and indeterminate grouped versus clustered fine microcalcifications at the 2:00 position of the left breast, middle depth. Recommend correlation with a mediolateral view and spot magnification CC and MLO views of the left breast for further evaluation. Diagnostic imaging followed on 12/30 and clustered microcalcifications in the upper outer aspect of the left breast are suspicious of malignancy. Recommend stereotactically guided biopsy.      1/10/23 - Left breast, stereotactic needle core biopsy at 2:00:  Biopsy at [facility] [place] - pathology report needed.   Left breast, needle core biopsy at 2:00: Ductal carcinoma in situ with   calcifications and at least microinvasion.    Patient states this was left invasive ductal carcinoma,  ER+    <1mm of invasive -- micro  1/19/23 - She did meet with Karley Garcia - consult note needed  Genetic counseling consult scheduled for 1/25 at [facility] - consult note needed.        Breast Cancer Risk Factors:   Breast biopsies: 2 breast biopsies on the right breast that demonstrated fibroadenomas  OCPs: duration of 10 years in college  Menarche: around 7th grade,  estimated at 15 years old  Gestational history: 3 pregnancies, 2 live births at 30 years and 32 years, 1  at 20 years  Currently using Mirena IUD for significant bleeding from fibroids. Estimated LMP is 2023  No first degree relatives with breast or ovarian cancer.  She does have a family history of a maternal great aunt and maternal first cousin with breast cancer.     Menstrual history: Menarche age 15.  First pregnancy age 20.  Ended in .  Pregnancies age 30 and 32 with live births.  She has fibroids has a Mirena IUD with light periods.  She did have a history of heavy menstrual bleeding related to the fibroids, before the Mirena IUD was placed.  No history of hormone replacement therapy.     PMH:   Attention deficit disorder  Allergies  Migraines  Fibroids  The patient does not have a history of low bone density, diabetes mellitus, hyperlipidemia, or hypertension     She has no history of breast surgery, breast cancer in the past, radiation therapy in the past, radiation exposure in the past, tumor of any kind, heart problems, heart attack, breathing problems, blood clots, seizures, arthritis, peptic ulcer disease, osteoporosis, bone fractures.  She is not currently participating in a clinical trial.     PSH:  Intrauterine device insertion     MEDICATIONS:  Cetirizine  Cyclobenzaprine  Fremanezumab-vfrm (Ajovy)  Methylphenidate  Sumatriptan  Tretinoin  Adderall  Rimegepant  Epinephrine 0.3 mg/0.3 mL     ALLERGY:  Bee pollen  Walnuts  Doxycycline  Sulfa  Wasps       Dog hair  - She follows with Dr. Rufus Cardenas at [facility]     FAMILY HISTORY:  Mother with skin cancer on the leg at 65 years  Father with prostate cancer at 62 years old, currently alive, no recurrence [id number]. Lilia 3+4.  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 The last noted menstrual cycle completed on January 22, 2023.     GENETICS:  NF1 [id number] ([id number]) possibly mosaic Uncertain Significance    PATHOLOGY:  A. LEFT BREAST, SEED-LOCALIZED LUMPECTOMY:  - INVASIVE DUCTAL CARCINOMA, [place] grade 2, measuring 2.5 mm in greatest extent.  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  - Margins are negative for invasive carcinoma; closest uninvolved anterior margin is 6 mm.  - Margins are negative for DCIS; closest uninvolved inferior and medial margins are 1.5 mm.  - AJCC pathologic staging is pT1a pN0(sn).  - Biopsy marker and biopsy site changes present.  - Fibrocystic change and columnar cell change with associated calcifications.  - Invasive carcinoma is ER (60%, weak) and CA (30%, moderate) positive, HER2 IHC is pending and the result will be reported in an addendum.     B. LEFT BREAST, NEW ANTERIOR MARGIN, EXCISION:  - Benign breast tissue with fibrocystic change, sclerosis adenosis and associated calcifications.  - Negative for atypia or malignancy.     C. LEFT BREAST, NEW LATERAL MARGIN, EXCISION:  - Benign breast tissue with fibrocystic change.  -  Negative for atypia or malignancy.     D. LEFT BREAST, NEW MEDIAL MARGIN, EXCISION:  - Benign breast tissue with fibrocystic change, columnar cell change and associated calcifications.  - Negative for atypia or malignancy.     E. SENTINEL LYMPH NODE, LEFT AXILLA, BIOPSY #1:  - One lymph node, negative for malignancy (0/1).     F. SENTINEL LYMPH NODE, LEFT AXILLA, BIOPSY #2:  - One lymph node, negative for malignancy (0/1).    TREATMENT HISTORY:  A.  Diagnosis with [id number] invasive ductal cancer of the left breast.  - Invasive carcinoma is ER (60%, weak) and KS (30%, moderate) positive,  HER2 negative 1+  B.  Mirana IUD removed.  C.  Lumpectomy  B.  Bilateral mammoplasty breast reduction.  Follow up with Dr. Arnold.  D.  Radiation planned  E.  Hormonal therapy with tamoxifen planned.        INTERVAL HISTORY:  Philippe returns to clinic after having had breast reduction surgery performed by her plastic surgeon.  She has recovered well from the surgery.  She has some minor pain in both breasts.  She has mild fatigue, no depression, no anxiety.  She does have an area of irritation in the right breast reconstruction at the 6 o'clock position.    A 10-point review of systems is entirely negative.  She is eating a healthful diet and she does not consume alcohol.  [facility] to doing exercise this past week.  She takes vitamin D and calcium.  On 02/24/2023, she underwent a left reduction mammoplasty and a right reduction mammoplasty and the pathology of both procedures was benign.  The procedure was performed by Dr. Marc Arnold and the left breast reduction mammoplasty showed benign breast tissue with nonproliferative fibrocystic changes, negative for carcinoma or atypical breast proliferative tissue and the right breast reduction mammoplasty showed benign breast tissue with fibrocystic change including usual ductal hyperplasia, negative for carcinoma or atypical breast  proliferative disease.    Philippe continues to have regular periods.  Her IUD was removed in February.  She initially had significant bleeding for her period, but her most recent period was quite light.  She has an appointment scheduled with Dr. Kirstin Lion to discuss CLEM-BSO in the future.     PHYSICAL EXAMINATION:    /77 (BP Location: Right arm)   Pulse 66   Temp 96.8  F (36  C) (Oral)   Resp 16   Wt 75.3 kg (166 lb 1.6 oz)   LMP 02/07/2023   SpO2 99%   BMI 27.81 kg/m    VITAL SIGNS:  As noted.  GENERAL:  Philippe appeared generally well. She has no alopecia.  HEENT:  Examination of the oropharynx is without lesions.  LYMPH:  There is no palpable cervical, supraclavicular, subclavicular or axillary lymphadenopathy.  BREASTS:  Right breast reveals a mammoplasty incision that is well healed without masses.  There is erythema at the T-junction at the 6 o'clock position.  This area of blanching erythema is approximately 1 cm in size, and I did advise her to go back to Dr. Arnold for possible incision infection.  The area was somewhat warm and tender.  On the left breast, there is a reduction mammoplasty incision that is well-healed without erythema or masses.  No signs or symptoms of infection.  There is a well-healed incision at the 3 o'clock position, 2 fingerbreadths from the nipple areolar complex without erythema or masses, and there is a left axillary incision that is well-healed without erythema or masses.  There are no masses in either breast.  LUNGS:  Clear to percussion and auscultation.  HEART:  There is a regular rate and rhythm.  S1, S2.  ABDOMEN:  Soft, nontender, without hepatosplenomegaly.  EXTREMITIES:  Without edema.  PSYCH:  Mood and affect were normal.     ASSESSMENT AND PLAN:   1.  Philippe Tom is a 52 year old pre-menopausal woman with a recent diagnosis of a left breast  nuclear grade 3 DCIS with a microinvasive component < 1 mm diagnosed from a stereotactic biopsy after routine  screening mammogram revealed calcifications. ECOG 0. She has not undergone surgical management, and will meet with Dr. Diggs tomorrow to discuss surgical options. Given that we do not have the full pathology, as will be delineated after surgical management, we discussed in general what options would be. We also discussed OncotypeDx would only be sent if her invasive component of disease was 5 mm or greater.   2.  Grade 3 DCIS with invasive component.  Resected.  Philippe Medellin returns to clinic after her subsequent bilateral mammoplasty reduction.  I am concerned about a small incision infection at the 6 o'clock position of the right breast mammoplasty incision, and she will go back to Dr. Arnold later today for evaluation of the possible infection.  There is no manuel drainage, but there is blanching erythema and a slight bit of warmth at the 6 o'clock position at the T-junction of the right breast mammoplasty incision.  3.  Discussion of Radiation Oncology consultation.  Philippe will see Dr. Rodrigues for consultation regarding post-lumpectomy radiation for her T1a invasive breast cancer and DCIS.  The appointment is 03/23/2023.  4.  Consideration of hormonal therapy.  I discussed that we will discuss hormonal therapy after radiation is completed.  I discussed that tamoxifen for 5 years would be a reasonable option for her.  This may be an issue, though, with the fibroids and a CLEM-BSO may be a better option for her in the long term.  5.  Discussion of fremanezumab, which is a CGRP inhibitor for migraines, also known as Ajovy.  Philippe asked me whether there might be any interaction between this migraine medicine and breast cancer risk, and I discussed that I am not aware of it, but I could take a look through the literature.  Reviewing this, there is no link to breast cancer risk that I can find.   6.  All of her questions were answered.  7. Menorrhagia secondary to fibroids  Tamoxifen could be an issue. Mirena  IUD removed because of link to breast cancer risk.   8.  Recommendations of exercise of 150 minutes/week based on the lace and pathway study.  9. Recommendation of a Mediterranean style diet low in saturated fat but not low in fat with more fruits and vegetables and less red meat.   10.  Recommendation for bone health includes vitamin D3 2000 international units daily and calcium 1 g/day either by diet or reading labels.  We also recommend bone strengthening exercises including stretch band hand weights and yoga.  11. Fatigue / Anxiety  - She currently does have a counselor. Not currently using medication towards this.  12. COVID-19 vaccination  - She has had her last (4th) booster in November 2022.   13.  Migraines, associated with menses  - Using sumatriptan and   14.  Follow up with Dr. Arnold for possible left breast skin infection.  She will call his office today. March 23 will start radiation. Follow up with me May 2 with CBC, CMP.     Thank you for allowing us to participate in this patient's care.     Sincerely,      Marc Judd MD  Professor  [facility]  [phone]        I spent 50 minutes with the patient more than 50% of which was in counseling and coordination of care.

## 2023-03-14 NOTE — NURSING NOTE
Chief Complaint   Patient presents with     Oncology Clinic Visit     Breast cancer     Lab Only     Vitals taken by RN.     No labs ordered for pt. Let clinic know & checked in for appt.     Radha Lucio RN

## 2023-03-15 ENCOUNTER — TRANSFERRED RECORDS (OUTPATIENT)
Dept: MULTI SPECIALTY CLINIC | Facility: CLINIC | Age: 53
End: 2023-03-15

## 2023-03-15 PROBLEM — C50.412 MALIGNANT NEOPLASM OF UPPER-OUTER QUADRANT OF LEFT BREAST IN FEMALE, ESTROGEN RECEPTOR POSITIVE (H): Status: ACTIVE | Noted: 2023-03-15

## 2023-03-15 PROBLEM — Z17.0 MALIGNANT NEOPLASM OF UPPER-OUTER QUADRANT OF LEFT BREAST IN FEMALE, ESTROGEN RECEPTOR POSITIVE (H): Status: ACTIVE | Noted: 2023-03-15

## 2023-03-18 NOTE — PROGRESS NOTES
Department of Radiation Oncology                   [facility]  [address] [place]  [address]  Office:  [phone]  Fax:  [phone]   Radiation Oncology Clinic  [address] [place] [address]  Phone:  [phone]  Fax:  [phone]     RE: Philippe Medellin : 1970   MRN: [id number] CLARIBEL: 3/22/2023     OUTPATIENT VISIT NOTE       PROBLEM: Invasive ductal carcinoma of the LEFT breast, s/p lumpectomy, pT1aN0,     Ms.Fettig Medeliln was seen for initial consultation in the Dept of Radiation Oncology on 3/22/2023 at the request of Dr. Judd.     HISTORY OF PRESENT ILLNESS: Ms. Medellin is a 53 yo female with an invasive carcinoma of the left breast. She has a previous history of fibroadenoma of the right breast and 2 benign cysts in the left breast.     Screening mammogram on 2022 showed grouped vs. clustered fine microcalcifications at 2:00 position of the left breast, middle depth,  New compared to previous mammogram a year ago. This was confirmed by diagnostic mammogram on 2022. Stereotactic biopsy performed on 1/10/2023 was consistent with DCIS with at least microinvasion. DCIS was nuclear grade 3 with comedonecrosis and ER weakly positive (11-20%).     She then established care with Dr. Judd and Dr. Diggs. She was advised to have Mirena IUD removed. She did undergo further imaging with a contrast mammogram on 2023, which showed the biopsy proven DCIS with microinvasion to measure approximately 1 cm. The grouped amorphous calcification slightly medial to the biopsy proven cancer seen on the outside mammogram was not well appreciated. The plan for biopsy this area was thus canceled.     On 2/15/2023, Philippe underwent seed-localized lumpectomy and SLN biopsy by Dr. Diggs. Pathology showed invasive ductal carcinoma, Grade 2, measuring 2.5 mm in greatest extent. Associated DCIS was intermediate grade, cribriform and solid types with focal  "necrosis, measuring up to 2.0 cm.Shaved margins were taken with the final closest invasive margin being 6 mm anterior, DCIS 1.5 mm inferior and medial. Two SLN were both negative. Final stage pT1aN0(sn).  Invasive carcinoma was ER 60% weakly positive, NH 30% moderately positive, HER2 negative by IHC (1+).    Philippe desired breast reduction and proceeded with bilateral reduction mammoplasty by Dr. Arnold on 2/24/2023. Dr. Arnold left the 3 titanium clips in the tumor bed intact without disturbing the lumpectomy cavity. The bulk of the breast volume was removed primarily from the superior and medial breast. Oncoplastic closure was performed to fill the lumpectomy defect. Pathology was negative for atypia or malignancy.     Post-op, Dr. Judd recommended Goserelin with Exemestane. An Oncotype Dx score was not sent due to the small size of the invasive component. She is interested in hysterectomy due to heavy menstrual bleeding and is planning to see Dr. Lion. She may consider oophorectomies as well to minimize the burden of Goserelin injections.    Philippe is here with her  today. On interview, she is doing well and finally starting to feel back at her baseline after recovering from her surgeries. Her left breast has healed well and she has almost fully regained range of motion at her shoulder joint. She was prescribed antibiotic ointment for her right breast incision. She recently got sick with strep throat and is currently taking Amoxicillin , which also helped with her presumed infection of the right breast. Her throat is starting to feel a little better. She has not started Endocrine therapy with Dr. Judd yet. She plans to start after completing radiation. She saw Dr. Lemos yesterday and got the \"greenlight\" to start radiation therapy.    PAST MEDICAL HISTORY:   Past Medical History:   Diagnosis Date     ADD (attention deficit disorder)     diagnosed 1998      Allergies     had allergy testing as a " child     Asthma      Breast cancer (H) 01/2023     Migraine      Past Surgical History:   Procedure Laterality Date     DENTAL SURGERY       INSERT INTRAUTERINE DEVICE       LUMPECTOMY BREAST, SEED LOCALIZATION, SENTINEL NODE Left 2/15/2023    Procedure: LUMPECTOMY, BREAST, WITH RADIOACTIVE SEED LOCALIZATION AND SENTINEL LYMPH NODE BIOPSY;  Surgeon: Noman Diggs MD;  Location: [facility] OR     CHEMOTHERAPY HISTORY: None     PAST RADIATION THERAPY HISTORY: None    MEDICATIONS:   Current Outpatient Medications   Medication Sig Dispense Refill     acetaminophen (TYLENOL) 325 MG tablet Take 2 tablets (650 mg) by mouth every 4 hours as needed for mild pain 50 tablet 0     ammonium lactate (AMLACTIN) 12 % external cream Apply 1 Application topically       Bioflavonoid Products (VITAMIN C) CHEW Take by mouth daily (with lunch) (Patient not taking: Reported on 3/14/2023)       cetirizine (ZYRTEC) 10 MG tablet Take 1 tablet by mouth every evening (Patient not taking: Reported on 3/14/2023)       cyclobenzaprine (FLEXERIL) 10 MG tablet Take 1 tablet by mouth every evening       EPINEPHrine (ANY BX GENERIC EQUIV) 0.3 MG/0.3ML injection 2-pack Inject 0.3 mg into the muscle as needed for anaphylaxis May repeat one time in 5-15 minutes if response to initial dose is inadequate. (Patient not taking: Reported on 3/2/2023)       fremanezumab-vfrm (AJOVY) SOSY subcutaneous every 30 days Next dose 2/12/23 (Patient not taking: Reported on 3/14/2023)       ketotifen (ZADITOR) 0.025 % ophthalmic solution 1 drop into affected eye (Patient not taking: Reported on 3/14/2023)       methylphenidate (CONCERTA) 36 MG CR tablet 2 tablets (Patient not taking: Reported on 3/2/2023)       methylphenidate (RITALIN) 20 MG tablet Take 20 mg by mouth every morning       Multiple Vitamins-Minerals (EYE VITAMINS PO) Take by mouth daily (with lunch) (Patient not taking: Reported on 3/14/2023)       MULTIVITAMINS OR Take by mouth daily (with lunch) (Patient  not taking: Reported on 3/14/2023)       oxyCODONE (ROXICODONE) 5 MG tablet Take 1-2 tablets (5-10 mg) by mouth every 4 hours as needed for moderate to severe pain 6 tablet 0     PROAIR  (90 BASE) MCG/ACT IN AERS Inhale into the lungs as needed (Patient not taking: Reported on 3/14/2023)       SUDAFED 30 MG OR TABS Take by mouth as needed (Patient not taking: Reported on 3/14/2023)       SUMAtriptan (IMITREX) 100 MG tablet Take 1 tablet by mouth at onset of headache (Patient not taking: Reported on 3/14/2023)       SUMAtriptan (IMITREX) 20 MG/ACT nasal spray Spray 1 spray in nostril as needed (Patient not taking: Reported on 3/14/2023)       tretinoin (RETIN-A) 0.025 % external cream 1 application in the evening to face (Patient not taking: Reported on 3/14/2023)       UNABLE TO FIND daily (with lunch) MEDICATION NAME: [place] T[place] (Patient not taking: Reported on 3/14/2023)       Vitamin D (Cholecalciferol) 25 MCG (1000 UT) TABS Take by mouth daily (with lunch) (Patient not taking: Reported on 3/14/2023)       ZOLMitriptan (ZOMIG-ZMT) 5 MG ODT Take 5 mg by mouth as needed (Patient not taking: Reported on 3/14/2023)         ALLERGIES: Ms.Fettig Lara allergic to bee pollen, dog epithelium, walnuts [nuts], doxycycline, sulfa drugs, sulfamethoxazole w/trimethoprim, and wasp venom.    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 accommodations at [employer]  2 children age 20 and 22  Never smoker  Occasional alcohol     FAMILY HISTORY:   family history includes Allergies in her daughter and son; Breast Cancer in her maternal aunt and maternal cousin; Cancer in her maternal grandfather; Cardiovascular in her father and paternal grandmother; Cerebrovascular Disease in her paternal grandmother; Colon Cancer in her maternal uncle; Gastrointestinal Disease in her mother; Osteoporosis in her maternal  grandmother and paternal grandmother; Prostate Cancer in her father and paternal grandfather; Skin Cancer in her mother; Thyroid Disease in her maternal grandmother and sister; Venous thrombosis in her paternal grandmother.  Maternal great aunt: breast cancer  Maternal first cousin: breast cancer    REVIEW OF SYMPTOMS:  A full 14-point review of systems was performed and is negative unless otherwise noted in the HPI.     Menarche: age 15    First pregnancy age 20  2 live births at age 30 and 32  Mirena IUD for fibroids  LMP: 3/4/2023      PHYSICAL EXAMINATION:    LMP 2023   /76   Pulse 90   Wt 74.8 kg (165 lb)   LMP 2023   SpO2 99%   BMI 27.62 kg/m    General: Alert, conversant and pleasant woman, no apparent distress  Resp: Breathing comfortably on room air  CV: Appears well perfused  Neuro: fluent speech, no focal deficits  Breast: Well healed surgical incisions with very mild erythema in the horizontal incision on the right. No signs of infection.     IMAGING:      ASSESSMENT AND PLAN: In summary, Ms. Medellin is a 53 yo premenopausal woman with an early left breast cancer. She has primarily a 2.0 cm grade 3 DCIS with finding of a 2.5 mm grade 2 invasive component. Both invasive carcinoma and DCIS were ER weakly positive. Additionally, Philippe underwent reduction mammoplasty. The titanium clips were left undisturbed. An oncoplastic closure was performed.     I recommend a course of adjuvant radiation therapy to the left breast to complete her breast conservation therapy. I discussed the rationale.     Her treatment course will be 42.5 Gy over 16 fractions. If I can confidently identify the lumpectomy cavity, I will also add a 10 Gy boost over 4 fractions. An argument can be made for the boost due to large size of the DCIS and low ER expression. However, if it is difficult to discern the cavity, I will forgo the boost.    I discussed the logistics and the side effects of the treatment. In  particular, we discussed acute side effects including fatigue and sunburn-like skin reaction. We will use deep inspiration breath hold technique to reduce heart and lung dose. She does take some supplements. We discussed the importance of limiting intake of anti-oxidants during treatment as it may decrease the effectiveness of radiation.     Philippe and her  asked many good questions indicating their understanding to our discussion. She would like to proceed and signed the consent form. She underwent simulation today (3/23).    Thank you for allowing us to participate in this patient's care.  Please feel free to call with any questions or concerns.       Jaiden Rodrigues M.D./Ph.D.  Radiation Oncologist   Department of Radiation Oncology  [facility]  Phone: [phone]       I reviewed patient's chart, internal/external medical records, imaging studies (including actual images), labs and pathology reports.  I interviewed and counseled the patient face to face.  I additionally discussed the case with patient's referring physicians and care team.          Jaiden Rodrigues MD

## 2023-03-23 ENCOUNTER — OFFICE VISIT (OUTPATIENT)
Dept: RADIATION ONCOLOGY | Facility: CLINIC | Age: 53
End: 2023-03-23
Attending: RADIOLOGY
Payer: COMMERCIAL

## 2023-03-23 ENCOUNTER — OFFICE VISIT (OUTPATIENT)
Dept: RADIATION ONCOLOGY | Facility: CLINIC | Age: 53
End: 2023-03-23
Attending: INTERNAL MEDICINE
Payer: COMMERCIAL

## 2023-03-23 VITALS
HEART RATE: 90 BPM | BODY MASS INDEX: 27.62 KG/M2 | WEIGHT: 165 LBS | SYSTOLIC BLOOD PRESSURE: 128 MMHG | DIASTOLIC BLOOD PRESSURE: 76 MMHG | OXYGEN SATURATION: 99 %

## 2023-03-23 DIAGNOSIS — Z17.0 MALIGNANT NEOPLASM OF UPPER-OUTER QUADRANT OF LEFT BREAST IN FEMALE, ESTROGEN RECEPTOR POSITIVE (H): ICD-10-CM

## 2023-03-23 DIAGNOSIS — C50.412 MALIGNANT NEOPLASM OF UPPER-OUTER QUADRANT OF LEFT BREAST IN FEMALE, ESTROGEN RECEPTOR POSITIVE (H): Primary | ICD-10-CM

## 2023-03-23 DIAGNOSIS — C50.412 MALIGNANT NEOPLASM OF UPPER-OUTER QUADRANT OF LEFT BREAST IN FEMALE, ESTROGEN RECEPTOR POSITIVE (H): ICD-10-CM

## 2023-03-23 DIAGNOSIS — Z17.0 MALIGNANT NEOPLASM OF UPPER-OUTER QUADRANT OF LEFT BREAST IN FEMALE, ESTROGEN RECEPTOR POSITIVE (H): Primary | ICD-10-CM

## 2023-03-23 PROCEDURE — 99204 OFFICE O/P NEW MOD 45 MIN: CPT | Mod: 25 | Performed by: RADIOLOGY

## 2023-03-23 PROCEDURE — 77334 RADIATION TREATMENT AID(S): CPT | Mod: 26 | Performed by: RADIOLOGY

## 2023-03-23 PROCEDURE — 77334 RADIATION TREATMENT AID(S): CPT | Performed by: RADIOLOGY

## 2023-03-23 PROCEDURE — 77290 THER RAD SIMULAJ FIELD CPLX: CPT | Performed by: RADIOLOGY

## 2023-03-23 PROCEDURE — G0463 HOSPITAL OUTPT CLINIC VISIT: HCPCS | Mod: 25 | Performed by: RADIOLOGY

## 2023-03-23 PROCEDURE — 77290 THER RAD SIMULAJ FIELD CPLX: CPT | Mod: 26 | Performed by: RADIOLOGY

## 2023-03-23 ASSESSMENT — ENCOUNTER SYMPTOMS
DEPRESSION: 0
HEADACHES: 1
BRUISES/BLEEDS EASILY: 0
INSOMNIA: 0
DYSURIA: 0
VOMITING: 0
NECK PAIN: 0
DIAPHORESIS: 0
SEIZURES: 1
CONSTIPATION: 1
FEVER: 0
DIZZINESS: 1
TINGLING: 0
NERVOUS/ANXIOUS: 1
FREQUENCY: 0
BLOOD IN STOOL: 0
SHORTNESS OF BREATH: 0
COUGH: 0
FALLS: 0
BACK PAIN: 0
DIARRHEA: 0
DOUBLE VISION: 0
CHILLS: 0
EYE PAIN: 0
SORE THROAT: 1
WEIGHT LOSS: 0
BLURRED VISION: 0
NAUSEA: 0

## 2023-03-23 NOTE — LETTER
3/23/2023         RE: Philippe Medellin  [address] [facility] [id number]        Dear Colleague,    Thank you for referring your patient, Philippe Medellin, to the [facility] RADIATION ONCOLOGY. Please see a copy of my visit note below.    Radiation Therapy Patient Education    Person involved with teaching: Patient and     Patient educational needs for self management of treatment-related side effects assessment completed.  EPIC Patient Ed tab contains Patient Learning Assessment    Education Materials Given  Skin Care During Radiation Treatment,sim pamphlet and schedule    Educational Topics Discussed  Side effects expected, Skin care, Activity, Nutrition and weight loss and When to call MD/RN    Response To Teaching  Verbalizes understanding    Referrals sent: None    Chemotherapy?  No          Again, thank you for allowing me to participate in the care of your patient.        Sincerely,        Jaiden Rodrigues MD

## 2023-03-23 NOTE — PROGRESS NOTES
"  HPI    INITIAL PATIENT ASSESSMENT    Diagnosis: Breast cancer, 2/15/23 Lt lumpectomy,  Reconstuction scheduled for 2/24/23    Prior radiation therapy: None    Prior chemotherapy: None    Prior hormonal therapy:Yes: Birth control in college: 7-10 yrs, Mirena IUD: recent;y removed.    Pain Eval:  Denies    Psychosocial  Living arrangements:   Fall Risk: independent   referral needs: Not needed    Advanced Directive: No  Implantable Cardiac Device? No    Onset of menarche: # age 15  LMP: Patient's last menstrual period was 02/07/2023.  Onset of menopause: Pre menopause. Last period 3/4/23  Abnormal vaginal bleeding/discharge: No  Are you pregnant? No  Reproductive note: 2 children, 3 pregnancies    Nurse face-to-face time: Level 4:  15 min face to face time    Review of Systems   Constitutional: Positive for malaise/fatigue (R/T surgery). Negative for chills, diaphoresis, fever and weight loss.   HENT: Positive for sore throat (Finishes amoxicilon for strep 3/29/23). Negative for ear pain and nosebleeds.    Eyes: Negative for blurred vision, double vision and pain.   Respiratory: Negative for cough and shortness of breath.    Cardiovascular: Negative for chest pain and leg swelling.   Gastrointestinal: Positive for constipation (Getting back to \"normal after pain meds\"). Negative for blood in stool, diarrhea, nausea and vomiting.   Genitourinary: Negative for dysuria, frequency and urgency.   Musculoskeletal: Negative for back pain, falls, joint pain and neck pain.   Skin: Negative for rash.   Neurological: Positive for dizziness (Little dizzy with exercise last night. Rested and it subsiided), seizures (1 time in [employer]) and headaches (Hx of migraines). Negative for tingling.   Endo/Heme/Allergies: Does not bruise/bleed easily.   Psychiatric/Behavioral: Negative for depression. The patient is nervous/anxious (With appointments). The patient does not have insomnia.                "

## 2023-03-23 NOTE — PROGRESS NOTES
Radiation Therapy Patient Education    Person involved with teaching: Patient and     Patient educational needs for self management of treatment-related side effects assessment completed.  [facility] Patient Ed tab contains Patient Learning Assessment    Education Materials Given  Skin Care During Radiation Treatment,sim pamphlet and schedule    Educational Topics Discussed  Side effects expected, Skin care, Activity, Nutrition and weight loss and When to call MD/RN    Response To Teaching  Verbalizes understanding    Referrals sent: None    Chemotherapy?  No

## 2023-03-23 NOTE — LETTER
3/23/2023         RE: Philippe Medellin  [address] [facility] [address]        Dear Colleague,    Thank you for referring your patient, Philippe Medellin, to the [facility] RADIATION ONCOLOGY. Please see a copy of my visit note below.    Department of Radiation Oncology                   [place] [address]  [address] [place]  [address]  Office:  [phone]  Fax:  [phone]   Radiation Oncology Clinic  [address] [place] [address]  Phone:  [phone]  Fax:  [phone]     RE: Philippe Medellin : 1970   MRN: [id number] CLARIBEL: 3/22/2023     OUTPATIENT VISIT NOTE       PROBLEM: Invasive ductal carcinoma of the LEFT breast, s/p lumpectomy, pT1aN0,     Ms.Fettig Medellin was seen for initial consultation in the Dept of Radiation Oncology on 3/22/2023 at the request of Dr. Judd.     HISTORY OF PRESENT ILLNESS: Ms. Medellin is a 51 yo female with an invasive carcinoma of the left breast. She has a previous history of fibroadenoma of the right breast and 2 benign cysts in the left breast.     Screening mammogram on 2022 showed grouped vs. clustered fine microcalcifications at 2:00 position of the left breast, middle depth,  New compared to previous mammogram a year ago. This was confirmed by diagnostic mammogram on 2022. Stereotactic biopsy performed on 1/10/2023 was consistent with DCIS with at least microinvasion. DCIS was nuclear grade 3 with comedonecrosis and ER weakly positive (11-20%).     She then established care with Dr. Judd and Dr. Diggs. She was advised to have Mirena IUD removed. She did undergo further imaging with a contrast mammogram on 2023, which showed the biopsy proven DCIS with microinvasion to measure approximately 1 cm. The grouped amorphous calcification slightly medial to the biopsy proven cancer seen on the outside mammogram was not well appreciated. The plan for biopsy this area was  thus canceled.     On 2/15/2023, Philippe underwent seed-localized lumpectomy and SLN biopsy by Dr. Diggs. Pathology showed invasive ductal carcinoma, Grade 2, measuring 2.5 mm in greatest extent. Associated DCIS was intermediate grade, cribriform and solid types with focal necrosis, measuring up to 2.0 cm.Shaved margins were taken with the final closest invasive margin being 6 mm anterior, DCIS 1.5 mm inferior and medial. Two SLN were both negative. Final stage pT1aN0(sn).  Invasive carcinoma was ER 60% weakly positive, HI 30% moderately positive, HER2 negative by IHC (1+).    Philippe desired breast reduction and proceeded with bilateral reduction mammoplasty by Dr. Arnold on 2/24/2023. Dr. Arnold left the 3 titanium clips in the tumor bed intact without disturbing the lumpectomy cavity. The bulk of the breast volume was removed primarily from the superior and medial breast. Oncoplastic closure was performed to fill the lumpectomy defect. Pathology was negative for atypia or malignancy.     Post-op, Dr. Judd recommended Goserelin with Exemestane. An Oncotype Dx score was not sent due to the small size of the invasive component. She is interested in hysterectomy due to heavy menstrual bleeding and is planning to see Dr. Lion. She may consider oophorectomies as well to minimize the burden of Goserelin injections.    Philippe is here with her  today. On interview, she is doing well and finally starting to feel back at her baseline after recovering from her surgeries. Her left breast has healed well and she has almost fully regained range of motion at her shoulder joint. She was prescribed antibiotic ointment for her right breast incision. She recently got sick with strep throat and is currently taking Amoxicillin , which also helped with her presumed infection of the right breast. Her throat is starting to feel a little better. She has not started Endocrine therapy with Dr. Judd yet. She plans to start  "after completing radiation. She saw Dr. Lemos yesterday and got the \"greenlight\" to start radiation therapy.    PAST MEDICAL HISTORY:   Past Medical History:   Diagnosis Date     ADD (attention deficit disorder)     diagnosed 1998      Allergies     had allergy testing as a child     Asthma      Breast cancer (H) 01/2023     Migraine      Past Surgical History:   Procedure Laterality Date     DENTAL SURGERY       INSERT INTRAUTERINE DEVICE       LUMPECTOMY BREAST, SEED LOCALIZATION, SENTINEL NODE Left 2/15/2023    Procedure: LUMPECTOMY, BREAST, WITH RADIOACTIVE SEED LOCALIZATION AND SENTINEL LYMPH NODE BIOPSY;  Surgeon: Noman Diggs MD;  Location: [facility] OR     CHEMOTHERAPY HISTORY: None     PAST RADIATION THERAPY HISTORY: None    MEDICATIONS:   Current Outpatient Medications   Medication Sig Dispense Refill     acetaminophen (TYLENOL) 325 MG tablet Take 2 tablets (650 mg) by mouth every 4 hours as needed for mild pain 50 tablet 0     ammonium lactate (AMLACTIN) 12 % external cream Apply 1 Application topically       Bioflavonoid Products (VITAMIN C) CHEW Take by mouth daily (with lunch) (Patient not taking: Reported on 3/14/2023)       cetirizine (ZYRTEC) 10 MG tablet Take 1 tablet by mouth every evening (Patient not taking: Reported on 3/14/2023)       cyclobenzaprine (FLEXERIL) 10 MG tablet Take 1 tablet by mouth every evening       EPINEPHrine (ANY BX GENERIC EQUIV) 0.3 MG/0.3ML injection 2-pack Inject 0.3 mg into the muscle as needed for anaphylaxis May repeat one time in 5-15 minutes if response to initial dose is inadequate. (Patient not taking: Reported on 3/2/2023)       fremanezumab-vfrm (AJOVY) SOSY subcutaneous every 30 days Next dose 2/12/23 (Patient not taking: Reported on 3/14/2023)       ketotifen (ZADITOR) 0.025 % ophthalmic solution 1 drop into affected eye (Patient not taking: Reported on 3/14/2023)       methylphenidate (CONCERTA) 36 MG CR tablet 2 tablets (Patient not taking: Reported on " 3/2/2023)       methylphenidate (RITALIN) 20 MG tablet Take 20 mg by mouth every morning       Multiple Vitamins-Minerals (EYE VITAMINS PO) Take by mouth daily (with lunch) (Patient not taking: Reported on 3/14/2023)       MULTIVITAMINS OR Take by mouth daily (with lunch) (Patient not taking: Reported on 3/14/2023)       oxyCODONE (ROXICODONE) 5 MG tablet Take 1-2 tablets (5-10 mg) by mouth every 4 hours as needed for moderate to severe pain 6 tablet 0     PROAIR  (90 BASE) MCG/ACT IN AERS Inhale into the lungs as needed (Patient not taking: Reported on 3/14/2023)       SUDAFED 30 MG OR TABS Take by mouth as needed (Patient not taking: Reported on 3/14/2023)       SUMAtriptan (IMITREX) 100 MG tablet Take 1 tablet by mouth at onset of headache (Patient not taking: Reported on 3/14/2023)       SUMAtriptan (IMITREX) 20 MG/ACT nasal spray Spray 1 spray in nostril as needed (Patient not taking: Reported on 3/14/2023)       tretinoin (RETIN-A) 0.025 % external cream 1 application in the evening to face (Patient not taking: Reported on 3/14/2023)       UNABLE TO FIND daily (with lunch) MEDICATION NAME: [place] (Patient not taking: Reported on 3/14/2023)       Vitamin D (Cholecalciferol) 25 MCG (1000 UT) TABS Take by mouth daily (with lunch) (Patient not taking: Reported on 3/14/2023)       ZOLMitriptan (ZOMIG-ZMT) 5 MG ODT Take 5 mg by mouth as needed (Patient not taking: Reported on 3/14/2023)         ALLERGIES: Ms.Fettig Lara allergic to bee pollen, dog epithelium, walnuts [nuts], doxycycline, sulfa drugs, sulfamethoxazole w/trimethoprim, and wasp venom.    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 at [employer]  2 children age 20 and 22  Never smoker  Occasional alcohol     FAMILY HISTORY:   family history includes Allergies in her daughter and son; Breast Cancer in her  maternal aunt and maternal cousin; Cancer in her maternal grandfather; Cardiovascular in her father and paternal grandmother; Cerebrovascular Disease in her paternal grandmother; Colon Cancer in her maternal uncle; Gastrointestinal Disease in her mother; Osteoporosis in her maternal grandmother and paternal grandmother; Prostate Cancer in her father and paternal grandfather; Skin Cancer in her mother; Thyroid Disease in her maternal grandmother and sister; Venous thrombosis in her paternal grandmother.  Maternal great aunt: breast cancer  Maternal first cousin: breast cancer    REVIEW OF SYMPTOMS:  A full 14-point review of systems was performed and is negative unless otherwise noted in the HPI.     Menarche: age 15    First pregnancy age 20  2 live births at age 30 and 32  Mirena IUD for fibroids  LMP: 3/4/2023      PHYSICAL EXAMINATION:    LMP 2023   /76   Pulse 90   Wt 74.8 kg (165 lb)   LMP 2023   SpO2 99%   BMI 27.62 kg/m    General: Alert, conversant and pleasant woman, no apparent distress  Resp: Breathing comfortably on room air  CV: Appears well perfused  Neuro: fluent speech, no focal deficits  Breast: Well healed surgical incisions with very mild erythema in the horizontal incision on the right. No signs of infection.     IMAGING:      ASSESSMENT AND PLAN: In summary, Ms. Medellin is a 51 yo premenopausal woman with an early left breast cancer. She has primarily a 2.0 cm grade 3 DCIS with finding of a 2.5 mm grade 2 invasive component. Both invasive carcinoma and DCIS were ER weakly positive. Additionally, Philippe underwent reduction mammoplasty. The titanium clips were left undisturbed. An oncoplastic closure was performed.     I recommend a course of adjuvant radiation therapy to the left breast to complete her breast conservation therapy. I discussed the rationale.     Her treatment course will be 42.5 Gy over 16 fractions. If I can confidently identify the lumpectomy cavity, I  will also add a 10 Gy boost over 4 fractions. An argument can be made for the boost due to large size of the DCIS and low ER expression. However, if it is difficult to discern the cavity, I will forgo the boost.    I discussed the logistics and the side effects of the treatment. In particular, we discussed acute side effects including fatigue and sunburn-like skin reaction. We will use deep inspiration breath hold technique to reduce heart and lung dose. She does take some supplements. We discussed the importance of limiting intake of anti-oxidants during treatment as it may decrease the effectiveness of radiation.     Philippe and her  asked many good questions indicating their understanding to our discussion. She would like to proceed and signed the consent form. She underwent simulation today (3/23).    Thank you for allowing us to participate in this patient's care.  Please feel free to call with any questions or concerns.       Jaiden Rodrigues M.D./Ph.D.  Radiation Oncologist   Department of Radiation Oncology  [facility]  Phone: [phone]       I reviewed patient's chart, internal/external medical records, imaging studies (including actual images), labs and pathology reports.  I interviewed and counseled the patient face to face.  I additionally discussed the case with patient's referring physicians and care team.          Jaiden Rodriuges MD      HPI    INITIAL PATIENT ASSESSMENT    Diagnosis: Breast cancer, 2/15/23 Lt lumpectomy,  Reconstuction scheduled for 2/24/23    Prior radiation therapy: None    Prior chemotherapy: None    Prior hormonal therapy:Yes: Birth control in college: 7-10 yrs, Mirena IUD: recent;y removed.    Pain Eval:  Denies    Psychosocial  Living arrangements:   Fall Risk: independent   referral needs: Not needed    Advanced Directive: No  Implantable Cardiac Device? No    Onset of menarche: # age 15  LMP: Patient's last menstrual  "period was 02/07/2023.  Onset of menopause: Pre menopause. Last period 3/4/23  Abnormal vaginal bleeding/discharge: No  Are you pregnant? No  Reproductive note: 2 children, 3 pregnancies    Nurse face-to-face time: Level 4:  15 min face to face time    Review of Systems   Constitutional: Positive for malaise/fatigue (R/T surgery). Negative for chills, diaphoresis, fever and weight loss.   HENT: Positive for sore throat (Finishes amoxicilon for strep 3/29/23). Negative for ear pain and nosebleeds.    Eyes: Negative for blurred vision, double vision and pain.   Respiratory: Negative for cough and shortness of breath.    Cardiovascular: Negative for chest pain and leg swelling.   Gastrointestinal: Positive for constipation (Getting back to \"normal after pain meds\"). Negative for blood in stool, diarrhea, nausea and vomiting.   Genitourinary: Negative for dysuria, frequency and urgency.   Musculoskeletal: Negative for back pain, falls, joint pain and neck pain.   Skin: Negative for rash.   Neurological: Positive for dizziness (Little dizzy with exercise last night. Rested and it subsiided), seizures (1 time in Jr [employer]) and headaches (Hx of migraines). Negative for tingling.   Endo/Heme/Allergies: Does not bruise/bleed easily.   Psychiatric/Behavioral: Negative for depression. The patient is nervous/anxious (With appointments). The patient does not have insomnia.        Again, thank you for allowing me to participate in the care of your patient.        Sincerely,        Jaiden Rodrigues MD    "

## 2023-03-24 ENCOUNTER — PATIENT OUTREACH (OUTPATIENT)
Dept: ONCOLOGY | Facility: CLINIC | Age: 53
End: 2023-03-24
Payer: COMMERCIAL

## 2023-03-24 NOTE — PROGRESS NOTES
Patient called asking for recommendations for an new primary care MD as hers retired. Gave names of Internists at the [facility] Dr Clarissa Parker, Dr Gill Dominguez, Dr Sheba Garcia, Dr Zack Gonzalez and Dr Kalli Kay [facility] scheduling [phone]. Also recommended Internists at the [facility] and she has the scheduling number as well.  Answered all patient's questions and verbalized understanding. Daria Ortega RN, BSN.

## 2023-03-31 ENCOUNTER — APPOINTMENT (OUTPATIENT)
Dept: RADIATION ONCOLOGY | Facility: CLINIC | Age: 53
End: 2023-03-31
Attending: RADIOLOGY
Payer: COMMERCIAL

## 2023-03-31 PROCEDURE — 77280 THER RAD SIMULAJ FIELD SMPL: CPT | Performed by: RADIOLOGY

## 2023-03-31 PROCEDURE — 77280 THER RAD SIMULAJ FIELD SMPL: CPT | Mod: 26 | Performed by: RADIOLOGY

## 2023-04-03 ENCOUNTER — OFFICE VISIT (OUTPATIENT)
Dept: RADIATION ONCOLOGY | Facility: CLINIC | Age: 53
End: 2023-04-03
Attending: RADIOLOGY
Payer: COMMERCIAL

## 2023-04-03 VITALS
BODY MASS INDEX: 28.63 KG/M2 | SYSTOLIC BLOOD PRESSURE: 116 MMHG | HEART RATE: 78 BPM | DIASTOLIC BLOOD PRESSURE: 67 MMHG | WEIGHT: 171 LBS | OXYGEN SATURATION: 98 %

## 2023-04-03 DIAGNOSIS — C50.412 MALIGNANT NEOPLASM OF UPPER-OUTER QUADRANT OF LEFT BREAST IN FEMALE, ESTROGEN RECEPTOR POSITIVE (H): Primary | ICD-10-CM

## 2023-04-03 DIAGNOSIS — Z17.0 MALIGNANT NEOPLASM OF UPPER-OUTER QUADRANT OF LEFT BREAST IN FEMALE, ESTROGEN RECEPTOR POSITIVE (H): Primary | ICD-10-CM

## 2023-04-03 PROCEDURE — 77387 GUIDANCE FOR RADJ TX DLVR: CPT | Performed by: RADIOLOGY

## 2023-04-03 PROCEDURE — 77412 RADIATION TX DELIVERY LVL 3: CPT | Performed by: RADIOLOGY

## 2023-04-03 PROCEDURE — G6002 STEREOSCOPIC X-RAY GUIDANCE: HCPCS | Mod: 26 | Performed by: RADIOLOGY

## 2023-04-03 NOTE — PROGRESS NOTES
[facility] PHYSICIANS  SPECIALIZING IN BREAKTHROUGHS  Radiation Oncology    On Treatment Visit Note      Philippe Medellin      Date: 4/3/2023   MRN: [id number]   : 1970  Diagnosis: Breast cancer      Reason for Visit:  On Radiation Treatment Visit     Treatment Summary to Date  Treatment Site: Lt breast Current Dose: 265/4240 cGy Fractions:       Chemotherapy  Chemo concurrent with radx?: No    ED Visit/Hosiptal Admission: None    Treatment Breaks: None      Subjective:   Philippe tolerated the first treatment well. She has questions about her radiation field, the use of Vitamins during the treatment as well as weather a boost will be delivered.     Nursing ROS:   Nutrition Alteration  Diet Type: Patient's Preference  Skin  Skin Note: Reviewed skin care with pt        Cardiovascular  Respiratory effort: 1 - Normal - without distress        Psychosocial  Psychosocial Note: New start today  Pain Assessment  0-10 Pain Scale: 0      Objective:   /67   Pulse 78   Wt 77.6 kg (171 lb)   LMP 2023   SpO2 98%   BMI 28.63 kg/m    Gen: Appears well, in no acute distress  Skin: No erythema    Labs:  CBC RESULTS: Recent Labs   Lab Test 02/10/23  1541   WBC 5.4   RBC 4.75   HGB 13.7   HCT 42.5   MCV 90   MCH 28.8   MCHC 32.2   RDW 12.8        ELECTROLYTES:  Recent Labs   Lab Test 02/10/23  1541      POTASSIUM 3.9   CHLORIDE 103   NATHANAEL 8.8   CO2 26   BUN 12.3   CR 0.59   *       Assessment:    Tolerating radiation therapy well.  All questions and concerns addressed.    Toxicities:  Dermatitis: Grade 0: No toxicity    Plan:   1. Continue current therapy.    2. We reviewed her radiation field and explained the beam arrangements. Her surgical clips are deep in the breast tissue, adjacent to the chest wall. With oncoplastic closure, I am not able to confidently identified the surgical cavity and feel that adding a boost that deep will end up treating quite a bit of  adjacent bianca breast tissues. I explained that the incremental benefit of adding a boost is small. Therefore, I elected to not add a boost after the whole breast radiation therapy.  3. We discussed skin care. She will be using Cerave.   4. We also discussed refraining from taking excessive amount of antioxidant during the radiation therapy. She voiced an understanding.       Mosaiq chart and setup information reviewed  Ports checked    Medication Review  Med Note: Ot has stopped her vitamins except her Vit. D3    Educational Topic Discussed  Education Instructions: reviewed        Jaiden Rodrigues MD/PhD  [phone] clinic  Pager [phone]    Please do not send letter to referring physician.

## 2023-04-03 NOTE — LETTER
4/3/2023         RE: Philippe Medellin  [address]  [facility] [address]        Dear Colleague,    Thank you for referring your patient, Philippe Medellin, to the [facility] RADIATION ONCOLOGY. Please see a copy of my visit note below.    [facility] PHYSICIANS  SPECIALIZING IN BREAKTHROUGHS  Radiation Oncology    On Treatment Visit Note      Philippe Medellin      Date: 4/3/2023   MRN: [id number]   : 1970  Diagnosis: Breast cancer      Reason for Visit:  On Radiation Treatment Visit     Treatment Summary to Date  Treatment Site: Lt breast Current Dose: 265/4240 cGy Fractions:       Chemotherapy  Chemo concurrent with radx?: No    ED Visit/Hosiptal Admission: None    Treatment Breaks: None      Subjective:   Philippe tolerated the first treatment well. She has questions about her radiation field, the use of Vitamins during the treatment as well as weather a boost will be delivered.     Nursing ROS:   Nutrition Alteration  Diet Type: Patient's Preference  Skin  Skin Note: Reviewed skin care with pt        Cardiovascular  Respiratory effort: 1 - Normal - without distress        Psychosocial  Psychosocial Note: New start today  Pain Assessment  0-10 Pain Scale: 0      Objective:   /67   Pulse 78   Wt 77.6 kg (171 lb)   LMP 2023   SpO2 98%   BMI 28.63 kg/m    Gen: Appears well, in no acute distress  Skin: No erythema    Labs:  CBC RESULTS: Recent Labs   Lab Test 02/10/23  1541   WBC 5.4   RBC 4.75   HGB 13.7   HCT 42.5   MCV 90   MCH 28.8   MCHC 32.2   RDW 12.8        ELECTROLYTES:  Recent Labs   Lab Test 02/10/23  1541      POTASSIUM 3.9   CHLORIDE 103   NATHANAEL 8.8   CO2 26   BUN 12.3   CR 0.59   *       Assessment:    Tolerating radiation therapy well.  All questions and concerns addressed.    Toxicities:  Dermatitis: Grade 0: No toxicity    Plan:   1. Continue current therapy.    2. We reviewed her radiation field and  explained the beam arrangements. Her surgical clips are deep in the breast tissue, adjacent to the chest wall. With oncoplastic closure, I am not able to confidently identified the surgical cavity and feel that adding a boost that deep will end up treating quite a bit of adjacent bianca breast tissues. I explained that the incremental benefit of adding a boost is small. Therefore, I elected to not add a boost after the whole breast radiation therapy.  3. We discussed skin care. She will be using Cerave.   4. We also discussed refraining from taking excessive amount of antioxidant during the radiation therapy. She voiced an understanding.       Mosaiq chart and setup information reviewed  Ports checked    Medication Review  Med Note: Ot has stopped her vitamins except her Vit. D3    Educational Topic Discussed  Education Instructions: reviewed        Jaiden Rodrigues MD/PhD  [phone] clinic  Pager [phone]    Please do not send letter to referring physician.               Again, thank you for allowing me to participate in the care of your patient.        Sincerely,        Jaiden Rodrigues MD

## 2023-04-04 ENCOUNTER — APPOINTMENT (OUTPATIENT)
Dept: RADIATION ONCOLOGY | Facility: CLINIC | Age: 53
End: 2023-04-04
Attending: RADIOLOGY
Payer: COMMERCIAL

## 2023-04-04 PROCEDURE — 77387 GUIDANCE FOR RADJ TX DLVR: CPT | Performed by: RADIOLOGY

## 2023-04-04 PROCEDURE — G6002 STEREOSCOPIC X-RAY GUIDANCE: HCPCS | Mod: 26 | Performed by: RADIOLOGY

## 2023-04-04 PROCEDURE — 77412 RADIATION TX DELIVERY LVL 3: CPT | Performed by: RADIOLOGY

## 2023-04-05 ENCOUNTER — APPOINTMENT (OUTPATIENT)
Dept: RADIATION ONCOLOGY | Facility: CLINIC | Age: 53
End: 2023-04-05
Attending: RADIOLOGY
Payer: COMMERCIAL

## 2023-04-05 PROCEDURE — 77412 RADIATION TX DELIVERY LVL 3: CPT | Performed by: RADIOLOGY

## 2023-04-05 PROCEDURE — G6002 STEREOSCOPIC X-RAY GUIDANCE: HCPCS | Mod: 26 | Performed by: RADIOLOGY

## 2023-04-05 PROCEDURE — 77387 GUIDANCE FOR RADJ TX DLVR: CPT | Performed by: RADIOLOGY

## 2023-04-06 ENCOUNTER — TELEPHONE (OUTPATIENT)
Dept: ONCOLOGY | Facility: CLINIC | Age: 53
End: 2023-04-06
Payer: COMMERCIAL

## 2023-04-06 NOTE — TELEPHONE ENCOUNTER
LA paperwork received via fax from [facility]. Will be placed in provider folder for signature upon completion.     Fax: [phone]      Renard Fermin

## 2023-04-06 NOTE — TELEPHONE ENCOUNTER
STD paperwork received via fax from [employer]. Will be placed in provider folder for signature upon completion.     Fax: [phone]      Renard Fermin

## 2023-04-07 ENCOUNTER — APPOINTMENT (OUTPATIENT)
Dept: RADIATION ONCOLOGY | Facility: CLINIC | Age: 53
End: 2023-04-07
Attending: RADIOLOGY
Payer: COMMERCIAL

## 2023-04-07 DIAGNOSIS — L30.9 DERMATITIS: Primary | ICD-10-CM

## 2023-04-07 PROCEDURE — 77387 GUIDANCE FOR RADJ TX DLVR: CPT | Performed by: RADIOLOGY

## 2023-04-07 PROCEDURE — 77412 RADIATION TX DELIVERY LVL 3: CPT | Performed by: RADIOLOGY

## 2023-04-07 RX ORDER — MOMETASONE FUROATE 1 MG/G
CREAM TOPICAL DAILY
Qty: 50 G | Refills: 1 | Status: SHIPPED | OUTPATIENT
Start: 2023-04-07 | End: 2023-05-25

## 2023-04-10 ENCOUNTER — OFFICE VISIT (OUTPATIENT)
Dept: RADIATION ONCOLOGY | Facility: CLINIC | Age: 53
End: 2023-04-10
Attending: RADIOLOGY
Payer: COMMERCIAL

## 2023-04-10 VITALS
DIASTOLIC BLOOD PRESSURE: 71 MMHG | WEIGHT: 172 LBS | BODY MASS INDEX: 28.8 KG/M2 | SYSTOLIC BLOOD PRESSURE: 110 MMHG | OXYGEN SATURATION: 100 % | HEART RATE: 74 BPM

## 2023-04-10 DIAGNOSIS — Z17.0 MALIGNANT NEOPLASM OF UPPER-OUTER QUADRANT OF LEFT BREAST IN FEMALE, ESTROGEN RECEPTOR POSITIVE (H): Primary | ICD-10-CM

## 2023-04-10 DIAGNOSIS — C50.412 MALIGNANT NEOPLASM OF UPPER-OUTER QUADRANT OF LEFT BREAST IN FEMALE, ESTROGEN RECEPTOR POSITIVE (H): Primary | ICD-10-CM

## 2023-04-10 PROCEDURE — 77387 GUIDANCE FOR RADJ TX DLVR: CPT | Performed by: RADIOLOGY

## 2023-04-10 PROCEDURE — 77336 RADIATION PHYSICS CONSULT: CPT | Performed by: RADIOLOGY

## 2023-04-10 PROCEDURE — G6002 STEREOSCOPIC X-RAY GUIDANCE: HCPCS | Mod: 26 | Performed by: RADIOLOGY

## 2023-04-10 PROCEDURE — 77412 RADIATION TX DELIVERY LVL 3: CPT | Performed by: RADIOLOGY

## 2023-04-10 PROCEDURE — 77427 RADIATION TX MANAGEMENT X5: CPT | Performed by: RADIOLOGY

## 2023-04-10 NOTE — LETTER
4/10/2023         RE: Philippe Medellin  [address]  [facility] [place] [address]        Dear Colleague,    Thank you for referring your patient, Philippe Medellin, to the [facility] RADIATION ONCOLOGY. Please see a copy of my visit note below.    [facility] PHYSICIANS  SPECIALIZING IN BREAKTHROUGHS  Radiation Oncology    On Treatment Visit Note      Philippe Medellin      Date: 4/10/2023   MRN: [id number]   : 1970  Diagnosis: Breast cancer    Reason for Visit:  On Radiation Treatment Visit     Treatment Summary to Date  Treatment Site: Lt breast Current Dose: 1325/4240 cGy Fractions: 5/16      Chemotherapy  Chemo concurrent with radx?: No    ED Visit/Hosiptal Admission: N/A    Treatment Breaks: 1 day, 23, due to local machine malfunction.    Subjective:     Philippe presents today for fifth fraction of 16.  She developed early erythema after just a couple of treatments. She was examined last Friday with localized erythema and edema, concerning for cellulitis. She was seen by a nurse practitioner in Plastic Surgery and was prescribed a 10-day course of Keflex. She states that cellulitis is resolving with decreased soreness, but some continuing fullness in the breast, particularly around the nipple. She reports some mild malaise and fatigue, which is also improving with antibiotics.  No shortness of breath or appetite changes    Nursing ROS:   Nutrition Alteration  Diet Type: Patient's Preference  Nutrition Note: appetite good  Skin  Skin Reaction: 0 - No changes (Still some reddness bottom aspect of her breast r/t cellulitits)  Skin Intervention: Nipple soreness, mepilex and Cool Magic given  Skin Note: Using aquaphor        Cardiovascular  Respiratory effort: 1 - Normal - without distress        Psychosocial  Psychosocial Note: feeling tired r/t infection in Lt breast  Pain Assessment  0-10 Pain Scale: 0      Objective:   /71   Pulse 74   Wt 78 kg  (172 lb)   LMP 02/07/2023   SpO2 100%   BMI 28.80 kg/m    Gen: Appears well, in no acute distress  Skin: Mild erythema about the inferior and lateral left breast.  Mild edema around the nipple and areola.       Labs:  CBC RESULTS: Recent Labs   Lab Test 02/10/23  1541   WBC 5.4   RBC 4.75   HGB 13.7   HCT 42.5   MCV 90   MCH 28.8   MCHC 32.2   RDW 12.8        ELECTROLYTES:  Recent Labs   Lab Test 02/10/23  1541      POTASSIUM 3.9   CHLORIDE 103   NATHANAEL 8.8   CO2 26   BUN 12.3   CR 0.59   *       Assessment:    Tolerating radiation therapy well.  Left breast cellulitis appears to be resolving per exam versus last week.  Do not believe topical steroids are advised at this time given cellulitis.  All questions and concerns addressed.    Toxicities:  Dermatitis: Grade 1: Some erythema though difficult to discern whether this is due to radiation, resolving cellulitis, or a combination of the two.    Plan:   1. Continue current therapy.    2. Continue with Aquaphor for skin care. For mild edema, encouraged patient to wear bra during daytime for compression.  3. Finish Keflex. Will monitor skin erythema.       Mosaiq chart and setup information reviewed  Ports checked    Medication Review  Med Note: Started Keflex per surgeon r/t cellulitis in Lt breast    Educational Topic Discussed  Education Instructions: reviewed    Patient seen and discussed with Dr. Rodrigues, attending.    Aaron James, MS4    Jaiden Rodrigues MD/PhD  [phone] clinic  Pager [phone]    Please do not send letter to referring physician.        I saw and examined the patient with the student  I have reviewed and edited the student's note and agree with the plan of care.             Jaiden Rodrigues MD        Again, thank you for allowing me to participate in the care of your patient.        Sincerely,        Jaiden Rodrigues MD

## 2023-04-10 NOTE — PROGRESS NOTES
[facility] PHYSICIANS  SPECIALIZING IN BREAKTHROUGHS  Radiation Oncology    On Treatment Visit Note      Philippe Medellin      Date: 4/10/2023   MRN: [id number]   : 1970  Diagnosis: Breast cancer    Reason for Visit:  On Radiation Treatment Visit     Treatment Summary to Date  Treatment Site: Lt breast Current Dose: 1325/4240 cGy Fractions:       Chemotherapy  Chemo concurrent with radx?: No    ED Visit/Hosiptal Admission: N/A    Treatment Breaks: 1 day, 23, due to local machine malfunction.    Subjective:     Philippe presents today for fifth fraction of 16.  She developed early erythema after just a couple of treatments. She was examined last Friday with localized erythema and edema, concerning for cellulitis. She was seen by a nurse practitioner in Plastic Surgery and was prescribed a 10-day course of Keflex. She states that cellulitis is resolving with decreased soreness, but some continuing fullness in the breast, particularly around the nipple. She reports some mild malaise and fatigue, which is also improving with antibiotics.  No shortness of breath or appetite changes    Nursing ROS:   Nutrition Alteration  Diet Type: Patient's Preference  Nutrition Note: appetite good  Skin  Skin Reaction: 0 - No changes (Still some reddness bottom aspect of her breast r/t cellulitits)  Skin Intervention: Nipple soreness, mepilex and Cool Magic given  Skin Note: Using aquaphor        Cardiovascular  Respiratory effort: 1 - Normal - without distress        Psychosocial  Psychosocial Note: feeling tired r/t infection in Lt breast  Pain Assessment  0-10 Pain Scale: 0      Objective:   /71   Pulse 74   Wt 78 kg (172 lb)   LMP 2023   SpO2 100%   BMI 28.80 kg/m    Gen: Appears well, in no acute distress  Skin: Mild erythema about the inferior and lateral left breast.  Mild edema around the nipple and areola.       Labs:  CBC RESULTS: Recent Labs   Lab Test 02/10/23  1541   WBC  5.4   RBC 4.75   HGB 13.7   HCT 42.5   MCV 90   MCH 28.8   MCHC 32.2   RDW 12.8        ELECTROLYTES:  Recent Labs   Lab Test 02/10/23  1541      POTASSIUM 3.9   CHLORIDE 103   NATHANAEL 8.8   CO2 26   BUN 12.3   CR 0.59   *       Assessment:    Tolerating radiation therapy well.  Left breast cellulitis appears to be resolving per exam versus last week.  Do not believe topical steroids are advised at this time given cellulitis.  All questions and concerns addressed.    Toxicities:  Dermatitis: Grade 1: Some erythema though difficult to discern whether this is due to radiation, resolving cellulitis, or a combination of the two.    Plan:   1. Continue current therapy.    2. Continue with Aquaphor for skin care. For mild edema, encouraged patient to wear bra during daytime for compression.  3. Finish Keflex. Will monitor skin erythema.       Mosaiq chart and setup information reviewed  Ports checked    Medication Review  Med Note: Started Keflex per surgeon r/t cellulitis in Lt breast    Educational Topic Discussed  Education Instructions: reviewed    Patient seen and discussed with Dr. Rodrigues, attending.    Aaron James, MS4    Jaiden Rodrigues MD/PhD  [phone] clinic  Pager [phone]    Please do not send letter to referring physician.        I saw and examined the patient with the student  I have reviewed and edited the student's note and agree with the plan of care.             Jaiden Rodrigues MD

## 2023-04-11 ENCOUNTER — APPOINTMENT (OUTPATIENT)
Dept: RADIATION ONCOLOGY | Facility: CLINIC | Age: 53
End: 2023-04-11
Attending: RADIOLOGY
Payer: COMMERCIAL

## 2023-04-11 PROCEDURE — 77412 RADIATION TX DELIVERY LVL 3: CPT | Performed by: RADIOLOGY

## 2023-04-11 PROCEDURE — G6002 STEREOSCOPIC X-RAY GUIDANCE: HCPCS | Mod: 26 | Performed by: RADIOLOGY

## 2023-04-11 PROCEDURE — 77387 GUIDANCE FOR RADJ TX DLVR: CPT | Performed by: RADIOLOGY

## 2023-04-12 ENCOUNTER — APPOINTMENT (OUTPATIENT)
Dept: RADIATION ONCOLOGY | Facility: CLINIC | Age: 53
End: 2023-04-12
Attending: RADIOLOGY
Payer: COMMERCIAL

## 2023-04-12 PROCEDURE — 77387 GUIDANCE FOR RADJ TX DLVR: CPT | Performed by: RADIOLOGY

## 2023-04-12 PROCEDURE — G6002 STEREOSCOPIC X-RAY GUIDANCE: HCPCS | Mod: 26 | Performed by: RADIOLOGY

## 2023-04-12 PROCEDURE — 77412 RADIATION TX DELIVERY LVL 3: CPT | Performed by: RADIOLOGY

## 2023-04-13 ENCOUNTER — APPOINTMENT (OUTPATIENT)
Dept: RADIATION ONCOLOGY | Facility: CLINIC | Age: 53
End: 2023-04-13
Attending: RADIOLOGY
Payer: COMMERCIAL

## 2023-04-13 PROCEDURE — 77412 RADIATION TX DELIVERY LVL 3: CPT | Performed by: RADIOLOGY

## 2023-04-13 PROCEDURE — 77387 GUIDANCE FOR RADJ TX DLVR: CPT | Performed by: RADIOLOGY

## 2023-04-13 PROCEDURE — G6002 STEREOSCOPIC X-RAY GUIDANCE: HCPCS | Mod: 26 | Performed by: RADIOLOGY

## 2023-04-13 NOTE — TELEPHONE ENCOUNTER
Signed form received and faxed to [employer], fax # [phone]. Successful transmission verified via rightfax. Copy of forms sent to scanning, original forms mailed to patient's home address on file.    Renard Fermin on 4/13/2023 at 3:44 PM

## 2023-04-13 NOTE — TELEPHONE ENCOUNTER
Signed form received and faxed to [facility], fax # [phone]. Successful transmission verified via rightfax. Copy of forms sent to scanning, original forms mailed to patient's home address on file.    Renard Fermin on 4/13/2023 at 3:44 PM

## 2023-04-14 ENCOUNTER — APPOINTMENT (OUTPATIENT)
Dept: RADIATION ONCOLOGY | Facility: CLINIC | Age: 53
End: 2023-04-14
Attending: RADIOLOGY
Payer: COMMERCIAL

## 2023-04-14 PROCEDURE — 77387 GUIDANCE FOR RADJ TX DLVR: CPT | Performed by: RADIOLOGY

## 2023-04-14 PROCEDURE — G6002 STEREOSCOPIC X-RAY GUIDANCE: HCPCS | Mod: 26 | Performed by: RADIOLOGY

## 2023-04-14 PROCEDURE — 77412 RADIATION TX DELIVERY LVL 3: CPT | Performed by: RADIOLOGY

## 2023-04-17 ENCOUNTER — APPOINTMENT (OUTPATIENT)
Dept: RADIATION ONCOLOGY | Facility: CLINIC | Age: 53
End: 2023-04-17
Attending: RADIOLOGY
Payer: COMMERCIAL

## 2023-04-17 VITALS
SYSTOLIC BLOOD PRESSURE: 126 MMHG | OXYGEN SATURATION: 96 % | BODY MASS INDEX: 28.63 KG/M2 | HEART RATE: 100 BPM | DIASTOLIC BLOOD PRESSURE: 74 MMHG | WEIGHT: 171 LBS

## 2023-04-17 DIAGNOSIS — C50.412 MALIGNANT NEOPLASM OF UPPER-OUTER QUADRANT OF LEFT BREAST IN FEMALE, ESTROGEN RECEPTOR POSITIVE (H): Primary | ICD-10-CM

## 2023-04-17 DIAGNOSIS — Z17.0 MALIGNANT NEOPLASM OF UPPER-OUTER QUADRANT OF LEFT BREAST IN FEMALE, ESTROGEN RECEPTOR POSITIVE (H): Primary | ICD-10-CM

## 2023-04-17 PROCEDURE — 77387 GUIDANCE FOR RADJ TX DLVR: CPT | Performed by: RADIOLOGY

## 2023-04-17 PROCEDURE — 77427 RADIATION TX MANAGEMENT X5: CPT | Performed by: RADIOLOGY

## 2023-04-17 PROCEDURE — G6002 STEREOSCOPIC X-RAY GUIDANCE: HCPCS | Mod: 26 | Performed by: RADIOLOGY

## 2023-04-17 PROCEDURE — 77336 RADIATION PHYSICS CONSULT: CPT | Performed by: RADIOLOGY

## 2023-04-17 PROCEDURE — 77412 RADIATION TX DELIVERY LVL 3: CPT | Performed by: RADIOLOGY

## 2023-04-17 NOTE — PROGRESS NOTES
[facility] PHYSICIANS  SPECIALIZING IN BREAKTHROUGHS  Radiation Oncology    On Treatment Visit Note      Philippe Medellin      Date: 2023   MRN: [id number]   : 1970  Diagnosis: Breast cancer      Reason for Visit:  On Radiation Treatment Visit     Treatment Summary to Date  Treatment Site: Lt breast Current Dose: 2650/4240 cGy Fractions: 10/16      Chemotherapy  Chemo concurrent with radx?: No    ED Visit/Hosiptal Admission: None    Treatment Breaks: None      Subjective:  Philippe completed antibiotics for presumed cellulitis on Friday. She noticed increased redness and swelling since then and wonders if antibiotics should be extended. She is also feeling more fatigued, though no fever. She is wearing a compression sleeve on her left arm for lymphedema prevention.     Nursing ROS:   Nutrition Alteration  Diet Type: Patient's Preference  Nutrition Note: appetite good  Skin  Skin Reaction: 1 - Faint erythema or dry desquamation (Still some reddness bottom aspect of her breast r/t cellulitits)  Skin Intervention: Nipple soreness, mepilex given  Skin Note: Using aquaphor        Cardiovascular  Respiratory effort: 1 - Normal - without distress        Psychosocial  Psychosocial Note: Consintues to feel tired  Pain Assessment  Pain Note: see above      Objective:   /74   Pulse 100   Wt 77.6 kg (171 lb)   SpO2 96%   BMI 28.63 kg/m    Gen: Appears well, in no acute distress  Skin: Erythema and edema of the left breast. Increased edema compared to last week. The redness primarily involve the anterior 1/2 of the breast. It is warm to touch.     Labs:  CBC RESULTS: Recent Labs   Lab Test 02/10/23  1541   WBC 5.4   RBC 4.75   HGB 13.7   HCT 42.5   MCV 90   MCH 28.8   MCHC 32.2   RDW 12.8        ELECTROLYTES:  Recent Labs   Lab Test 02/10/23  1541      POTASSIUM 3.9   CHLORIDE 103   NATHANAEL 8.8   CO2 26   BUN 12.3   CR 0.59   *       Assessment:    Tolerating radiation  therapy well.  All questions and concerns addressed.    Toxicities:  Fatigue: Grade 1: Fatigue relieved by rest  Dermatitis: Grade 1: Faint erythema or dry desquamation    Plan:   1. Continue current therapy.    2. The erythema and edema is out of proportion to her current radiation dose. She will contact her plastic surgeon's office for evaluation and management.       Mosaiq chart and setup information reviewed  Ports checked    Medication Review  Med Note: Finished Keflex last Friday, will call surgeon about maybe needing another dose/evaluation.    Educational Topic Discussed  Education Instructions: reviewed        Jaiden Rodrigues MD/PhD  [phone] clinic  Pager [phone]

## 2023-04-17 NOTE — LETTER
2023         RE: Philippe Medellin  [address]  [place] [address]        Dear Colleague,    Thank you for referring your patient, Philippe Medellin, to the [facility] RADIATION ONCOLOGY. Please see a copy of my visit note below.    [facility] PHYSICIANS  SPECIALIZING IN BREAKTHROUGHS  Radiation Oncology    On Treatment Visit Note      Philippe Medellin      Date: 2023   MRN: [id number]   : 1970  Diagnosis: Breast cancer      Reason for Visit:  On Radiation Treatment Visit     Treatment Summary to Date  Treatment Site: Lt breast Current Dose: 2650/4240 cGy Fractions: 10/16      Chemotherapy  Chemo concurrent with radx?: No    ED Visit/Hosiptal Admission: None    Treatment Breaks: None      Subjective:  Philippe completed antibiotics for presumed cellulitis on Friday. She noticed increased redness and swelling since then and wonders if antibiotics should be extended. She is also feeling more fatigued, though no fever. She is wearing a compression sleeve on her left arm for lymphedema prevention.     Nursing ROS:   Nutrition Alteration  Diet Type: Patient's Preference  Nutrition Note: appetite good  Skin  Skin Reaction: 1 - Faint erythema or dry desquamation (Still some reddness bottom aspect of her breast r/t cellulitits)  Skin Intervention: Nipple soreness, mepilex given  Skin Note: Using aquaphor        Cardiovascular  Respiratory effort: 1 - Normal - without distress        Psychosocial  Psychosocial Note: Consintues to feel tired  Pain Assessment  Pain Note: see above      Objective:   /74   Pulse 100   Wt 77.6 kg (171 lb)   SpO2 96%   BMI 28.63 kg/m    Gen: Appears well, in no acute distress  Skin: Erythema and edema of the left breast. Increased edema compared to last week. The redness primarily involve the anterior 1/2 of the breast. It is warm to touch.     Labs:  CBC RESULTS: Recent Labs   Lab Test 02/10/23  1541   WBC 5.4    RBC 4.75   HGB 13.7   HCT 42.5   MCV 90   MCH 28.8   MCHC 32.2   RDW 12.8        ELECTROLYTES:  Recent Labs   Lab Test 02/10/23  1541      POTASSIUM 3.9   CHLORIDE 103   NATHANAEL 8.8   CO2 26   BUN 12.3   CR 0.59   *       Assessment:    Tolerating radiation therapy well.  All questions and concerns addressed.    Toxicities:  Fatigue: Grade 1: Fatigue relieved by rest  Dermatitis: Grade 1: Faint erythema or dry desquamation    Plan:   1. Continue current therapy.    2. The erythema and edema is out of proportion to her current radiation dose. She will contact her plastic surgeon's office for evaluation and management.       Mosaiq chart and setup information reviewed  Ports checked    Medication Review  Med Note: Finished Keflex last Friday, will call surgeon about maybe needing another dose/evaluation.    Educational Topic Discussed  Education Instructions: reviewed        Jaiden Rodrigues MD/PhD  [phone] clinic  Pager [phone]           Again, thank you for allowing me to participate in the care of your patient.        Sincerely,        Jaiden Rodrigues MD

## 2023-04-21 ENCOUNTER — APPOINTMENT (OUTPATIENT)
Dept: RADIATION ONCOLOGY | Facility: CLINIC | Age: 53
End: 2023-04-21
Attending: RADIOLOGY
Payer: COMMERCIAL

## 2023-04-21 PROCEDURE — 77412 RADIATION TX DELIVERY LVL 3: CPT | Performed by: RADIOLOGY

## 2023-04-21 PROCEDURE — G6002 STEREOSCOPIC X-RAY GUIDANCE: HCPCS | Mod: 26 | Performed by: RADIOLOGY

## 2023-04-21 PROCEDURE — 77387 GUIDANCE FOR RADJ TX DLVR: CPT | Performed by: RADIOLOGY

## 2023-04-24 ENCOUNTER — OFFICE VISIT (OUTPATIENT)
Dept: RADIATION ONCOLOGY | Facility: CLINIC | Age: 53
End: 2023-04-24
Attending: RADIOLOGY
Payer: COMMERCIAL

## 2023-04-24 VITALS
BODY MASS INDEX: 28.78 KG/M2 | DIASTOLIC BLOOD PRESSURE: 65 MMHG | SYSTOLIC BLOOD PRESSURE: 116 MMHG | WEIGHT: 171.9 LBS | HEART RATE: 80 BPM | OXYGEN SATURATION: 99 %

## 2023-04-24 DIAGNOSIS — Z17.0 MALIGNANT NEOPLASM OF UPPER-OUTER QUADRANT OF LEFT BREAST IN FEMALE, ESTROGEN RECEPTOR POSITIVE (H): Primary | ICD-10-CM

## 2023-04-24 DIAGNOSIS — C50.412 MALIGNANT NEOPLASM OF UPPER-OUTER QUADRANT OF LEFT BREAST IN FEMALE, ESTROGEN RECEPTOR POSITIVE (H): Primary | ICD-10-CM

## 2023-04-24 PROCEDURE — 77412 RADIATION TX DELIVERY LVL 3: CPT | Performed by: RADIOLOGY

## 2023-04-24 PROCEDURE — 77387 GUIDANCE FOR RADJ TX DLVR: CPT | Performed by: RADIOLOGY

## 2023-04-24 PROCEDURE — G6002 STEREOSCOPIC X-RAY GUIDANCE: HCPCS | Mod: 26 | Performed by: RADIOLOGY

## 2023-04-24 NOTE — PATIENT INSTRUCTIONS
Continuing Management of the Effects of Radiation Treatment    The side effects of radiation therapy should gradually decrease in 2 to 3 weeks after you have finished radiation.  Some effects take longer to resolve.    Skin reactions:  Skin changes (such as redness or irritation) should begin to get better gradually.  Some people will have a permanent change in skin color.  Their skin may be more pink or  tan  than the untreated skin.  The skin may be thinner or more fragile than before treatment.  Continue to use a gentle moisturizing lotion for several months.  You should always protect the skin in the area that was treated by using sunscreen of spf 30 or higher.      Other skin care instructions:    Fatigue caused by radiation therapy will decrease and your energy will improve.    For concerns or questions call Department of Therapeutic Radiology [phone]

## 2023-04-24 NOTE — PROGRESS NOTES
"[facility] PHYSICIANS  SPECIALIZING IN BREAKTHROUGHS  Radiation Oncology    On Treatment Visit Note      Philippe Medellin      Date: 2023   MRN: [id number]   : 1970  Diagnosis: Breast cancer      Reason for Visit:  On Radiation Treatment Visit     Treatment Summary to Date  Treatment Site: Lt breast Current Dose: 3170/4240 cGy Fractions:       Chemotherapy  Chemo concurrent with radx?: No    ED Visit/Hosiptal Admission: None    Treatment Breaks: None    Subjective:   Philippe's second course of antibiotics for presumed cellulitis (reported in last weeks OTV) ends tomorrow, 2023.  On 2023, she reported that a 1.5 cc \"fluid pocket\" was drained by her plastic surgery office; this was \"clear and yellow\" without gross evidence of infection.  She reports feeling better overall since resuming antibiotics but remains somewhat fatigued.  She denies fever today.  She is wearing a compression sleeve on her left arm for lymphedema prevention.     Nursing ROS:   Nutrition Alteration  Diet Type: Patient's Preference  Nutrition Note: appetite good  Skin  Skin Reaction: 1 - Faint erythema or dry desquamation (Still some reddness bottom aspect of her breast r/t cellulitits, finishes antibiotic tomorrow)  Skin Intervention: Nipple soreness, mepilex given  Skin Note: Using aquaphor        Cardiovascular  Respiratory effort: 1 - Normal - without distress        Psychosocial  Psychosocial Note: Consintues to feel tired  Pain Assessment  0-10 Pain Scale: 0  Pain Note: see above      Objective:   /65   Pulse 80   Wt 78 kg (171 lb 14.4 oz)   SpO2 99%   BMI 28.78 kg/m    Gen: Appears well, in no acute distress  Skin: Edema and moderate erythema over the left breast, primarily over its anterior half.  Grossly unchanged from last week.    Labs:  CBC RESULTS: Recent Labs   Lab Test 02/10/23  1541   WBC 5.4   RBC 4.75   HGB 13.7   HCT 42.5   MCV 90   MCH 28.8   MCHC 32.2   RDW 12.8   PLT " 211     ELECTROLYTES:  Recent Labs   Lab Test 02/10/23  1541      POTASSIUM 3.9   CHLORIDE 103   NATHANAEL 8.8   CO2 26   BUN 12.3   CR 0.59   *       Assessment:    Tolerating radiation therapy well.  All questions and concerns addressed.    Toxicities:  Fatigue: Grade 1: Fatigue relieved by rest  Dermatitis: Grade 2: Moderate to brisk erythema; patchy moist desquamation, mostly confined to skin folds and creases; moderate erythema    Plan:   1. Continue current therapy.    2. Continue applying moisturizer for 2 to 3 weeks after treatment is complete.   3. Patient plans to follow-up with her plastic surgeon's office in 2 days, after completing her current course of antibiotic, for guidance on further evaluation and treatment of presumed cellulitis.  4. Follow-up with Dr. Rodrigues in 1 month, or sooner if erythema and/or swelling persist and clinical suspicion for cellulitis is low.    Mosaiq chart and setup information reviewed  Ports checked    Medication Review  Med Note: Finishes 2nd round of antibiotics tomorrow    Educational Topic Discussed  Education Instructions: D/C instructions reviewed        Jaiden Rodrigues MD/PhD  [phone] clinic  Pager [phone]    Please do not send letter to referring physician.      I saw and examined the patient with the student.  I have reviewed and edited the student's note and agree with the plan of care.         Jaiden Rodrigues MD

## 2023-04-24 NOTE — LETTER
"    2023         RE: Philippe Medellin  [address]  [place] [address]        Dear Colleague,    Thank you for referring your patient, Philippe Medellin, to the [facility] RADIATION ONCOLOGY. Please see a copy of my visit note below.    [facility] PHYSICIANS  SPECIALIZING IN BREAKTHROUGHS  Radiation Oncology    On Treatment Visit Note      Philippe Medellin      Date: 2023   MRN: [id number]   : 1970  Diagnosis: Breast cancer      Reason for Visit:  On Radiation Treatment Visit     Treatment Summary to Date  Treatment Site: Lt breast Current Dose: 3170/4240 cGy Fractions:       Chemotherapy  Chemo concurrent with radx?: No    ED Visit/Hosiptal Admission: None    Treatment Breaks: None    Subjective:   Philippe's second course of antibiotics for presumed cellulitis (reported in last weeks OTV) ends tomorrow, 2023.  On 2023, she reported that a 1.5 cc \"fluid pocket\" was drained by her plastic surgery office; this was \"clear and yellow\" without gross evidence of infection.  She reports feeling better overall since resuming antibiotics but remains somewhat fatigued.  She denies fever today.  She is wearing a compression sleeve on her left arm for lymphedema prevention.     Nursing ROS:   Nutrition Alteration  Diet Type: Patient's Preference  Nutrition Note: appetite good  Skin  Skin Reaction: 1 - Faint erythema or dry desquamation (Still some reddness bottom aspect of her breast r/t cellulitits, finishes antibiotic tomorrow)  Skin Intervention: Nipple soreness, mepilex given  Skin Note: Using aquaphor        Cardiovascular  Respiratory effort: 1 - Normal - without distress        Psychosocial  Psychosocial Note: Consintues to feel tired  Pain Assessment  0-10 Pain Scale: 0  Pain Note: see above      Objective:   /65   Pulse 80   Wt 78 kg (171 lb 14.4 oz)   SpO2 99%   BMI 28.78 kg/m    Gen: Appears well, in no acute " distress  Skin: Edema and moderate erythema over the left breast, primarily over its anterior half.  Grossly unchanged from last week.    Labs:  CBC RESULTS: Recent Labs   Lab Test 02/10/23  1541   WBC 5.4   RBC 4.75   HGB 13.7   HCT 42.5   MCV 90   MCH 28.8   MCHC 32.2   RDW 12.8        ELECTROLYTES:  Recent Labs   Lab Test 02/10/23  1541      POTASSIUM 3.9   CHLORIDE 103   NATHANAEL 8.8   CO2 26   BUN 12.3   CR 0.59   *       Assessment:    Tolerating radiation therapy well.  All questions and concerns addressed.    Toxicities:  Fatigue: Grade 1: Fatigue relieved by rest  Dermatitis: Grade 2: Moderate to brisk erythema; patchy moist desquamation, mostly confined to skin folds and creases; moderate erythema    Plan:   1. Continue current therapy.    2. Continue applying moisturizer for 2 to 3 weeks after treatment is complete.   3. Patient plans to follow-up with her plastic surgeon's office in 2 days, after completing her current course of antibiotic, for guidance on further evaluation and treatment of presumed cellulitis.  4. Follow-up with Dr. Rodrigues in 1 month, or sooner if erythema and/or swelling persist and clinical suspicion for cellulitis is low.    Mosaiq chart and setup information reviewed  Ports checked    Medication Review  Med Note: Finishes 2nd round of antibiotics tomorrow    Educational Topic Discussed  Education Instructions: D/C instructions reviewed        Jaiden Rodrigues MD/PhD  [phone] clinic  Pager [phone]    Please do not send letter to referring physician.      I saw and examined the patient with the student.  I have reviewed and edited the student's note and agree with the plan of care.         Jaiden Rodrigues MD        Again, thank you for allowing me to participate in the care of your patient.        Sincerely,        Jaiden Rodrigues MD

## 2023-04-25 ENCOUNTER — APPOINTMENT (OUTPATIENT)
Dept: RADIATION ONCOLOGY | Facility: CLINIC | Age: 53
End: 2023-04-25
Attending: RADIOLOGY
Payer: COMMERCIAL

## 2023-04-25 PROCEDURE — 77387 GUIDANCE FOR RADJ TX DLVR: CPT | Performed by: RADIOLOGY

## 2023-04-25 PROCEDURE — 77412 RADIATION TX DELIVERY LVL 3: CPT | Performed by: RADIOLOGY

## 2023-04-25 PROCEDURE — G6002 STEREOSCOPIC X-RAY GUIDANCE: HCPCS | Mod: 26 | Performed by: RADIOLOGY

## 2023-04-26 ENCOUNTER — APPOINTMENT (OUTPATIENT)
Dept: RADIATION ONCOLOGY | Facility: CLINIC | Age: 53
End: 2023-04-26
Attending: RADIOLOGY
Payer: COMMERCIAL

## 2023-04-26 PROCEDURE — 77412 RADIATION TX DELIVERY LVL 3: CPT | Performed by: RADIOLOGY

## 2023-04-26 PROCEDURE — 77387 GUIDANCE FOR RADJ TX DLVR: CPT | Performed by: RADIOLOGY

## 2023-04-26 PROCEDURE — G6002 STEREOSCOPIC X-RAY GUIDANCE: HCPCS | Mod: 26 | Performed by: RADIOLOGY

## 2023-04-27 ENCOUNTER — APPOINTMENT (OUTPATIENT)
Dept: RADIATION ONCOLOGY | Facility: CLINIC | Age: 53
End: 2023-04-27
Attending: RADIOLOGY
Payer: COMMERCIAL

## 2023-04-27 PROCEDURE — 77387 GUIDANCE FOR RADJ TX DLVR: CPT | Performed by: RADIOLOGY

## 2023-04-27 PROCEDURE — 77427 RADIATION TX MANAGEMENT X5: CPT | Performed by: RADIOLOGY

## 2023-04-27 PROCEDURE — 77412 RADIATION TX DELIVERY LVL 3: CPT | Performed by: RADIOLOGY

## 2023-04-27 PROCEDURE — G6002 STEREOSCOPIC X-RAY GUIDANCE: HCPCS | Mod: 26 | Performed by: RADIOLOGY

## 2023-04-27 PROCEDURE — 77336 RADIATION PHYSICS CONSULT: CPT | Performed by: RADIOLOGY

## 2023-04-28 ENCOUNTER — OFFICE VISIT (OUTPATIENT)
Dept: RADIATION ONCOLOGY | Facility: CLINIC | Age: 53
End: 2023-04-28
Attending: RADIOLOGY
Payer: COMMERCIAL

## 2023-04-28 DIAGNOSIS — C50.412 MALIGNANT NEOPLASM OF UPPER-OUTER QUADRANT OF LEFT BREAST IN FEMALE, ESTROGEN RECEPTOR POSITIVE (H): Primary | ICD-10-CM

## 2023-04-28 DIAGNOSIS — Z17.0 MALIGNANT NEOPLASM OF UPPER-OUTER QUADRANT OF LEFT BREAST IN FEMALE, ESTROGEN RECEPTOR POSITIVE (H): Primary | ICD-10-CM

## 2023-04-28 PROCEDURE — 77412 RADIATION TX DELIVERY LVL 3: CPT | Performed by: RADIOLOGY

## 2023-04-28 PROCEDURE — 77387 GUIDANCE FOR RADJ TX DLVR: CPT | Performed by: RADIOLOGY

## 2023-04-28 NOTE — PROGRESS NOTES
[facility] PHYSICIANS  SPECIALIZING IN BREAKTHROUGHS  Radiation Oncology    On Treatment Visit Note      Philippe Medellin      Date: 2023   MRN: [id number]   : 1970         Reason for Visit:  On Radiation Treatment Visit     Treatment Summary to Date   L Breast  4240 cGy/4240 cGy   16 fx/ 16 fx           ED Visit/Hosiptal Admission:  None       Subjective:   Philippe completed radiation therapy today. Her skin has been stable. She applies Mepilex dressing to the nipple area to minimize skin irritation. She denies fever or pain.          Objective:   There were no vitals taken for this visit.  Gen: Appears well, in no acute distress  Skin: Brisk erythema and moderate edema of the left breast, especially in the anterior 2/3 of the breast. No overt desquamation.     Labs:  CBC RESULTS: Recent Labs   Lab Test 02/10/23  1541   WBC 5.4   RBC 4.75   HGB 13.7   HCT 42.5   MCV 90   MCH 28.8   MCHC 32.2   RDW 12.8        ELECTROLYTES:  Recent Labs   Lab Test 02/10/23  1541      POTASSIUM 3.9   CHLORIDE 103   NATHANAEL 8.8   CO2 26   BUN 12.3   CR 0.59   *       Assessment:    Tolerating radiation therapy well.  All questions and concerns addressed.    Toxicities:  Fatigue: Grade 1: Fatigue relieved by rest  Dermatitis: Grade 2: Moderate to brisk erythema; patchy moist desquamation, mostly confined to skin folds and creases; moderate erythema    Plan:   1. Completed radiation therapy today.   2. Discussed skin care.   3. Follow up in person 1 month.       [employer] chart and setup information reviewed  Ports checked                  Jaiden Rodrigues MD/PhD  [phone] clinic  Pager [phone]    Please do not send letter to referring physician.

## 2023-04-28 NOTE — LETTER
2023         RE: Philippe Medellin  [address]  [place] [address]        Dear Colleague,    Thank you for referring your patient, Philippe Medellin, to the [facility] RADIATION ONCOLOGY. Please see a copy of my visit note below.    [facility] PHYSICIANS  SPECIALIZING IN BREAKTHROUGHS  Radiation Oncology    On Treatment Visit Note      Philippe Medellin      Date: 2023   MRN: [id number]   : 1970         Reason for Visit:  On Radiation Treatment Visit     Treatment Summary to Date   L Breast  4240 cGy/4240 cGy   16 fx/ 16 fx           ED Visit/Hosiptal Admission:  None       Subjective:   Philippe completed radiation therapy today. Her skin has been stable. She applies Mepilex dressing to the nipple area to minimize skin irritation. She denies fever or pain.          Objective:   There were no vitals taken for this visit.  Gen: Appears well, in no acute distress  Skin: Brisk erythema and moderate edema of the left breast, especially in the anterior 2/3 of the breast. No overt desquamation.     Labs:  CBC RESULTS: Recent Labs   Lab Test 02/10/23  1541   WBC 5.4   RBC 4.75   HGB 13.7   HCT 42.5   MCV 90   MCH 28.8   MCHC 32.2   RDW 12.8        ELECTROLYTES:  Recent Labs   Lab Test 02/10/23  1541      POTASSIUM 3.9   CHLORIDE 103   NATHANAEL 8.8   CO2 26   BUN 12.3   CR 0.59   *       Assessment:    Tolerating radiation therapy well.  All questions and concerns addressed.    Toxicities:  Fatigue: Grade 1: Fatigue relieved by rest  Dermatitis: Grade 2: Moderate to brisk erythema; patchy moist desquamation, mostly confined to skin folds and creases; moderate erythema    Plan:   1. Completed radiation therapy today.   2. Discussed skin care.   3. Follow up in person 1 month.       [id number] chart and setup information reviewed  Ports checked                  Jaidne Rodrigues MD/PhD  [phone] clinic  Pager [phone]    Please do not  send letter to referring physician.        Again, thank you for allowing me to participate in the care of your patient.        Sincerely,        Jaiden Rodrigues MD

## 2023-04-29 NOTE — PROCEDURES
Radiotherapy Treatment Summary          Date of Report: 2023     PATIENT: PHILIPPE MCGOVERN  MEDICAL RECORD NO: [id number]  : 1970     DIAGNOSIS: C50.412 Malignant neoplasm of upper-outer quadrant of left female breast  INTENT OF RADIOTHERAPY: Cure  PATHOLOGY:    Invasive ductal carcinoma, grade 2, ER+, IA+, HER2-                             STAGE: pT1aN0(sn)  CONCURRENT SYSTEMIC THERAPY:  None        Details of the treatments summarized below are found in records kept in the Department of Radiation Oncology at [facility].     Treatment Summary:  Radiation Oncology - Course: 1 Protocol:   Treatment Site Current Dose Modality From To Elapsed Days Fx.  1 Left Breast  4,240 cGy 06 X  4/03/2023  2023  25 16             Dose per Fraction: 265 cGy   Total Dose:      4240 cGy        COMMENTS:    Ms. Medellin is a 51 yo female with an invasive carcinoma of the left breast. This was detected on   routine screening mammogram as microcalcification. Stereotactic biopsy was consistent with DCIS with at least   microinvasion. DCIS was nuclear grade 3 with comedonecrosis and ER weakly positive (11-20%).      On 2/15/2023, Philippe underwent seed-localized lumpectomy and SLN biopsy by Dr. Diggs. Pathology showed invasive   ductal carcinoma, Grade 2, measuring 2.5 mm in greatest extent. Associated DCIS was intermediate grade, cribriform   and solid types with focal necrosis, measuring up to 2.0 cm.Shaved margins were taken with the final closest invasive   margin being 6 mm anterior, DCIS 1.5 mm inferior and medial. Two SLN were both negative. Final stage   pT1aN0(sn).  Invasive carcinoma was ER 60% weakly positive, IA 30% moderately positive, HER2 negative by IHC   (1+).     Philippe desired breast reduction and proceeded with bilateral reduction mammoplasty by Dr. Arnold on 2023.   Pathology was negative for atypia or malignancy.      Post-op, Dr. Judd recommended Goserelin with Exemestane. An Oncotype  Dx score was not sent due to the small size of   the invasive component.       Cresencio then proceeded with adjuvant radiation therapy. Her lumpectomy cavity could not be clearly discerned, and   therefore a decision was made to forgo a lumpectomy cavity boost.      Very early in her treatment course, she noticed significant erythema of the left breast with fatigue, though no fever. She   was evaluated by Dr. Arnold's office and was prescribed a course of antibiotics for presumed cellulitis. She improved   clinically. [place] through the radiation therapy, she again started to not feel well. Her breast edema was out of   proportion to the expected inflammatory changes. She was evaluated by Dr. Arnold and was initiated on a second course   of antibiotics with clinical improvement.            ED visits/hospitalizations: None     Missed treatments:  1. 4/6: machine malfunction  2. 4/18-4/20: cellulitis     Acute Toxicity Profile by CTC v5.0:  Fatigue: grade 1  Dermatitis: grade 2        PAIN MANAGEMENT:   Not required                      FOLLOW UP PLAN:       1. Follow up with our clinic in 1 month  2. Follow up with medical oncology as scheduled.                           Staff Physician: Jaiden Rodrigues M.D.     CC:  MD Marc Jimenez MD Rahel Ghebre, MD                 Radiation Oncology:  [facility] [address] [place] [address]

## 2023-05-01 ENCOUNTER — ANCILLARY PROCEDURE (OUTPATIENT)
Dept: ULTRASOUND IMAGING | Facility: CLINIC | Age: 53
End: 2023-05-01
Attending: OBSTETRICS & GYNECOLOGY
Payer: COMMERCIAL

## 2023-05-01 ENCOUNTER — TELEPHONE (OUTPATIENT)
Dept: RADIATION ONCOLOGY | Facility: CLINIC | Age: 53
End: 2023-05-01

## 2023-05-01 DIAGNOSIS — N93.8 DYSFUNCTIONAL UTERINE BLEEDING: ICD-10-CM

## 2023-05-01 DIAGNOSIS — R23.4 ACUTE DESQUAMATIVE ERUPTION OF SKIN: Primary | ICD-10-CM

## 2023-05-01 PROCEDURE — 76856 US EXAM PELVIC COMPLETE: CPT | Performed by: RADIOLOGY

## 2023-05-01 PROCEDURE — 76830 TRANSVAGINAL US NON-OB: CPT | Performed by: RADIOLOGY

## 2023-05-01 RX ORDER — MUPIROCIN 20 MG/G
OINTMENT TOPICAL 3 TIMES DAILY
Qty: 30 G | Refills: 1 | Status: SHIPPED | OUTPATIENT
Start: 2023-05-01 | End: 2023-05-25

## 2023-05-01 NOTE — TELEPHONE ENCOUNTER
Philippe called regarding skin break down in her armpit post radiation therapy. Reviewed with Dr. Rodrigues, patient has sulfa allergy so Silvadene not prescribed. Order for Bactroban sent to [facility] in [place].    Reviewed wound care and when to call MD.   Verbalized understanding.   Follow up appointment is 5/25 but RN will call in a few days to follow up on skin.

## 2023-05-02 ENCOUNTER — APPOINTMENT (OUTPATIENT)
Dept: LAB | Facility: CLINIC | Age: 53
End: 2023-05-02
Attending: INTERNAL MEDICINE
Payer: COMMERCIAL

## 2023-05-02 ENCOUNTER — ONCOLOGY VISIT (OUTPATIENT)
Dept: ONCOLOGY | Facility: CLINIC | Age: 53
End: 2023-05-02
Attending: INTERNAL MEDICINE
Payer: COMMERCIAL

## 2023-05-02 VITALS
BODY MASS INDEX: 28.75 KG/M2 | TEMPERATURE: 97.9 F | SYSTOLIC BLOOD PRESSURE: 126 MMHG | WEIGHT: 171.7 LBS | HEART RATE: 79 BPM | DIASTOLIC BLOOD PRESSURE: 62 MMHG | OXYGEN SATURATION: 98 % | RESPIRATION RATE: 16 BRPM

## 2023-05-02 DIAGNOSIS — Z17.0 MALIGNANT NEOPLASM OF UPPER-OUTER QUADRANT OF LEFT BREAST IN FEMALE, ESTROGEN RECEPTOR POSITIVE (H): Primary | ICD-10-CM

## 2023-05-02 DIAGNOSIS — C50.412 MALIGNANT NEOPLASM OF UPPER-OUTER QUADRANT OF LEFT BREAST IN FEMALE, ESTROGEN RECEPTOR POSITIVE (H): Primary | ICD-10-CM

## 2023-05-02 LAB
HOLD SPECIMEN: NORMAL
HOLD SPECIMEN: NORMAL

## 2023-05-02 PROCEDURE — G0463 HOSPITAL OUTPT CLINIC VISIT: HCPCS | Performed by: INTERNAL MEDICINE

## 2023-05-02 PROCEDURE — 99215 OFFICE O/P EST HI 40 MIN: CPT | Performed by: INTERNAL MEDICINE

## 2023-05-02 RX ORDER — CETIRIZINE HYDROCHLORIDE 10 MG/1
TABLET ORAL
COMMUNITY
Start: 2023-04-14

## 2023-05-02 RX ORDER — TAMOXIFEN CITRATE 10 MG/1
5 TABLET ORAL DAILY
Qty: 45 TABLET | Refills: 3 | Status: SHIPPED | OUTPATIENT
Start: 2023-05-02 | End: 2024-02-14

## 2023-05-02 NOTE — NURSING NOTE
"Oncology Rooming Note    May 2, 2023 2:54 PM   Philippe Medellin is a 52 year old female who presents for:    Chief Complaint   Patient presents with     Blood Draw     Labs drawn via  by RN. Vitals taken.     Oncology Clinic Visit     RETURN - BREAST CANCER     Initial Vitals: /62 (BP Location: Right arm, Patient Position: Sitting, Cuff Size: Adult Regular)   Pulse 79   Temp 97.9  F (36.6  C) (Oral)   Resp 16   Wt 77.9 kg (171 lb 11.2 oz)   SpO2 98%   BMI 28.75 kg/m   Estimated body mass index is 28.75 kg/m  as calculated from the following:    Height as of 3/2/23: 1.646 m (5' 4.8\").    Weight as of this encounter: 77.9 kg (171 lb 11.2 oz). Body surface area is 1.89 meters squared.  Data Unavailable Comment: 2   No LMP recorded.  Allergies reviewed: Yes  Medications reviewed: Yes    Medications: Medication refills not needed today.  Pharmacy name entered into EPIC:    [facility]  [id number] [place], [place] - [address]    Clinical concerns: No new clinical concerns other than reason for visit today.        Trang Farrell [facility]            "

## 2023-05-02 NOTE — NURSING NOTE
Chief Complaint   Patient presents with     Blood Draw     Labs drawn via  by RN. Vitals taken.     Labs drawn with  by RN. All vitals except BP taken. Pt requests manual BP in clinic. Patient checked into next appointment.    Brii Lopez RN

## 2023-05-02 NOTE — PROGRESS NOTES
MEDICAL ONCOLOGY FOLLOW UP NOTE     Philippe Tom  Female, 52 year old, 1970  MRN:   [id number]           Dear Dr. Diggs,     Thank you for referring Philippe Tom regarding a newly diagnosed DCIS with a microinvasive component.     PROBLEM: New diagnosis of left sided DCIS with invasive component     HISTORY OF PRESENT ILLNESS: Ms. Tom is a 52 year old woman from [place], [place] with a new diagnosis of left breast Nuclear Grade 3 ER+(15-20%) DCIS with less than 1 mm of invasive disease as part of workup following routine screening mammogram that detected calcifications. She has not undergone surgical management as of yet, and is establishing care at [facility] with Medical Oncology, and will be seeing Dr. Noman Diggs for surgical consultation on 1/27/2023.      Her oncologic diagnosis came to light after routine screening mammogram on 12/27/2022 demonstrated indeterminate grouped versus clustered fine microcalcifcations at the 2 o'clock position of the left breast at mid depth. Of note, she does have a history of previous breast biopsies in the right breast on 12/10/2015 that detected fibroadenomas at the 8 o'clock position, 5 cm from the nipple measuring 11 x 6 x 10 mm, and at the 10 o'clock position, 7 cm from the nipple measuring 8 x 4 x 7 mm. Within the 8 o'clock position, rare calcifications were noted adjacent to the fibroadenoma. She also had a diagnostic mammogram for a palpable lump in the left breast on 5/21/2019 at the 2 o'clock position. Focused left breast ultrasound demonstrated an anechoic cyst at the 2 o'clock psition, 8 cm from the nipple measuring 1.7 x 1.2 x 1.5 cm, noted to be benign. An additional anechoic cyst was noted at 1:30 position, 7 cm from the nipple measurign up to 8 mm and routine yearly mammography was recommended.     She underwent stereotactic guided [facility] on 1/10/2023 with pathology (Specimen -[id number])  demonstrating Nuclear Grade 3 ER+ (11-20%, weak) DCIS with central comedonecrosis, with less than 1 mm of invasive disease noted within the specimen.      She was seen by Dr. Kelly Garcia for surgical consultation on 2023 who discussed surgical options with the patient. Note unavailable in care everywhere    She saw Medical Genetics at [facility] on 2023, seeing Sasha Chacko. [facility]'s STAT breast panel was sent.      SUMMARY OF OUTSIDE STUDIES:  Patient had bilateral screening mammograms on 22 and indeterminate grouped versus clustered fine microcalcifications at the 2:00 position of the left breast, middle depth. Recommend correlation with a mediolateral view and spot magnification CC and MLO views of the left breast for further evaluation. Diagnostic imaging followed on  and clustered microcalcifications in the upper outer aspect of the left breast are suspicious of malignancy. Recommend stereotactically guided biopsy.      1/10/23 - Left breast, stereotactic needle core biopsy at 2:00:  Biopsy at [facility] - pathology report needed.   Left breast, needle core biopsy at 2:00: Ductal carcinoma in situ with   calcifications and at least microinvasion.    Patient states this was left invasive ductal carcinoma,  ER+    <1mm of invasive -- micro  23 - She did meet with Karley Garcia - consult note needed  Genetic counseling consult scheduled for  at [facility] - consult note needed.        Breast Cancer Risk Factors:   Breast biopsies: 2 breast biopsies on the right breast that demonstrated fibroadenomas  OCPs: duration of 10 years in college  Menarche: around 7th grade, estimated at 15 years old  Gestational history: 3 pregnancies, 2 live births at 30 years and 32 years, 1  at 20 years  Currently using Mirena IUD for significant bleeding from fibroids. Estimated LMP is 2023  No first degree relatives with breast or ovarian  cancer.  She does have a family history of a maternal great aunt and maternal first cousin with breast cancer.     Menstrual history: Menarche age 15.  First pregnancy age 20.  Ended in .  Pregnancies age 30 and 32 with live births.  She has fibroids has a Mirena IUD with light periods.  She did have a history of heavy menstrual bleeding related to the fibroids, before the Mirena IUD was placed.  No history of hormone replacement therapy.     PMH:   Attention deficit disorder  Allergies  Migraines  Fibroids  The patient does not have a history of low bone density, diabetes mellitus, hyperlipidemia, or hypertension     She has no history of breast surgery, breast cancer in the past, radiation therapy in the past, radiation exposure in the past, tumor of any kind, heart problems, heart attack, breathing problems, blood clots, seizures, arthritis, peptic ulcer disease, osteoporosis, bone fractures.  She is not currently participating in a clinical trial.     PSH:  Intrauterine device insertion     MEDICATIONS:  Cetirizine  Cyclobenzaprine  Fremanezumab-vfrm (Ajovy)  Methylphenidate  Sumatriptan  Tretinoin  Adderall  Rimegepant  Epinephrine 0.3 mg/0.3 mL     ALLERGY:  Bee pollen  Walnuts  Doxycycline  Sulfa  Wasps       Dog hair  - She follows with Dr. Rufus Cardenas at [facility]     FAMILY HISTORY:  Mother with skin cancer on the leg at 65 years  Father with prostate cancer at 62 years old, currently alive, no recurrence [id number]. [place] 3+4.  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 The last noted menstrual cycle completed on January 22, 2023.      GENETICS:  NF1 c.1586T>C ([id number]) possibly mosaic Uncertain Significance     PATHOLOGY:  A. LEFT BREAST, SEED-LOCALIZED LUMPECTOMY:  - INVASIVE DUCTAL CARCINOMA, Grover grade 2, measuring 2.5 mm in greatest extent.  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  - Margins are negative for invasive carcinoma; closest uninvolved anterior margin is 6 mm.  - Margins are negative for DCIS; closest uninvolved inferior and medial margins are 1.5 mm.  - AJCC pathologic staging is pT1a pN0(sn).  - Biopsy marker and biopsy site changes present.  - Fibrocystic change and columnar cell change with associated calcifications.  - Invasive carcinoma is ER (60%, weak) and WY (30%, moderate) positive, HER2 IHC is pending and the result will be reported in an addendum.     B. LEFT BREAST, NEW ANTERIOR MARGIN, EXCISION:  - Benign breast tissue with fibrocystic change, sclerosis adenosis and associated calcifications.  - Negative for atypia or malignancy.     C. LEFT BREAST, NEW LATERAL MARGIN, EXCISION:  - Benign breast tissue with fibrocystic change.  - Negative for atypia or malignancy.     D. LEFT BREAST, NEW MEDIAL MARGIN, EXCISION:  - Benign breast tissue with fibrocystic change, columnar cell change and associated calcifications.  - Negative for atypia or malignancy.     E. SENTINEL LYMPH NODE, LEFT AXILLA, BIOPSY #1:  - One  lymph node, negative for malignancy (0/1).     F. SENTINEL LYMPH NODE, LEFT AXILLA, BIOPSY #2:  - One lymph node, negative for malignancy (0/1).     TREATMENT HISTORY:  A.  Diagnosis with [id number] invasive ductal cancer of the left breast.  - Invasive carcinoma is ER (60%, weak) and MO (30%, moderate) positive,  HER2 negative 1+  B.  Mirana IUD removed.  C.  Lumpectomy  D.  Bilateral mammoplasty breast reduction.  Follow up with Dr. Arnold.  E.  Radiation  Treatment Summary:  Radiation Oncology - Course: 1         Protocol:   Treatment Site            Current Dose   Modality           From    To        Elapsed Days  Fx.  1 Left Breast     4,240 cGy       06 X      4/03/2023        4/28/2023        25       16                                                Dose per Fraction: 265 cGy   Total Dose:      4240 cGy  F.  Hormonal therapy with tamoxifen planned.         COVID-19 vaccination  - She has had her last (4th) booster in November 2022.         INTERVAL HISTORY:     She returns to our clinic for recommendations regarding adjuvant hormonal therapy.  She did have postlumpectomy and post bilateral mammoplasty radiation which was completed April 28.  She has had a cellulitis of the left breast and is on Keflex.  The course of antibiotics will be determined by her plastic surgeon.  She does have pain in the left axilla and left breast with some redness of the skin more related to the radiation then to cellulitis.  She has significant fatigue.  She denies depression or anxiety.  She came to clinic with questions about whether low-dose tamoxifen for 5 years could be considered even though there is a small invasive component.  She was seen with her .    Overall she has mild pain in the left axilla some fatigue, no depression, no anxiety.     Philippe continues to have regular periods.  Her IUD was removed in February.  She initially had significant bleeding for her period, but her most recent period was quite light.   She has an appointment scheduled with Dr. Kirstin Lion to discuss CLEM-BSO in the future.     REVIEW OF SYSTEMS: A 10 point review of systems was negative.    PHYSICAL EXAM:  /62 (BP Location: Right arm, Patient Position: Sitting, Cuff Size: Adult Regular)   Pulse 79   Temp 97.9  F (36.6  C) (Oral)   Resp 16   Wt 77.9 kg (171 lb 11.2 oz)   SpO2 98%   BMI 28.75 kg/m    General: She appears generally well.  Oropharynx: Good dentition no lesions lymph:  No cervical supraclavicular subclavicular or axillary lymphadenopathy  Breast exam: No masses in the right breast.  The skin of the left breast showed blanching erythema throughout.  There is a bandage over the nipple areolar complex which was not removed.  There is minor skin peeling over the left breast.  There is erythema of the skin over the left axilla.  There was no warmth.  Lungs: Clear to percussion auscultation  Heart: Regular rate and rhythm S1-S2.  Abdomen: Soft nontender without hepatosplenomegaly.  Extremities: Without edema.  Mood and affect were normal.     ASSESSMENT AND PLAN:   1.  Philippe Tom is a 52 year old pre-menopausal woman with a recent diagnosis of a left breast  nuclear grade 3 DCIS with a microinvasive component < 1 mm diagnosed from a stereotactic biopsy after routine screening mammogram revealed calcifications. ECOG 0. She has not undergone surgical management, and will meet with Dr. Diggs tomorrow to discuss surgical options. Given that we do not have the full pathology, as will be delineated after surgical management, we discussed in general what options would be. We also discussed OncotypeDx would only be sent if her invasive component of disease was 5 mm or greater.  2.  Bilateral mammoplasty reduction with complication of infection, that episode resolved.  3.  Radiation to left breast.  Completed April 28.    4.  Post radiation skin erythema.   I think this is related to radiation with mild blanching erythema.  She is off  antibiotics.  5.  Grade 3 DCIS with micro invasive component.  She came to clinic today to have a discussion about adjuvant hormonal therapy.  Although there is a small T1a microinvasive component, she wanted to know whether she could have adjuvant therapy that is more tailored to her DCIS which is estrogen receptor positive.  I discussed with her that this would be a very reasonable approach.  She brought up the paper by Haylee where low-dose tamoxifen of 5 mg/day was shown to be effective as adjuvant therapy for ER positive ductal carcinoma in situ.  After discussion of the risks and potential benefits of this approach we decided jointly to go ahead with 5 years of adjuvant tamoxifen hormonal therapy.  We since then 5 could be worse by tamoxifen and that going with a lower dose tamoxifen could also be helpful in that regard.  She is thinking about a hysterectomy and oophorectomy.  6.  Discussion of the risks and potential benefits of tamoxifen adjuvant hormonal therapy.  We discussed the 0.5 per thousand women per year risk of thrombosis and the 0.5 per thousand women per year risk of endometrial cancer.  She is thinking about having a hysterectomy because she does have fibroids.  She knows to report to us if she has any vaginal bleeding between periods on tamoxifen or worsening of vaginal bleeding with periods.    4.  Consideration of hysterectomy and oophorectomy. This may be an issue, though, with the fibroids and a CLEM-BSO may be a better option for her in the long term.  5.  Discussion of fremanezumab, which is a CGRP inhibitor for migraines, also known as Ajovy.  Philippe asked me whether there might be any interaction between this migraine medicine and breast cancer risk, and I discussed that I am not aware of it, but I could take a look through the literature.  Reviewing this, there is no link to breast cancer risk that I can find.  She again asked me about cancer risk and I told her that I have been unable  to find any information on this issue in part because this is a medication that is not widely used and it takes epidemiological data to find associations between individual medicines and breast cancer risk  6.  Recommendations of exercise of 150 minutes/week based on the lace and pathway study.  7. Recommendation of a Mediterranean style diet low in saturated fat but not low in fat with more fruits and vegetables and less red meat.   8.  Recommendation for bone health includes vitamin D3 2000 international units daily and calcium 1 g/day either by diet or reading labels.  We also recommend bone strengthening exercises including stretch band hand weights and yoga.  9.  Migraines, associated with menses  10.  Follow up.  Follow up with Gillian June 6 with CBC, CMP. Follow up with me September 5 with me with CBC, CMP. Mammogram CBC, CMP November 2 and follow up with me.     Thank you for allowing us to participate in this patient's care.     Sincerely,      Marc Judd MD  Professor  [facility]  [phone]        I spent 50 minutes with the patient more than 50% of which was in counseling and coordination of care.

## 2023-05-02 NOTE — LETTER
5/2/2023         RE: Philippe Medellin  [address]  [facility] [id number]        Dear Colleague,    Thank you for referring your patient, Philippe Medellin, to the [facility]. Please see a copy of my visit note below.    MEDICAL ONCOLOGY FOLLOW UP NOTE     Philippe Tom  Female, 52 year old, 1970  MRN:   [id number]           Dear Dr. Diggs,     Thank you for referring Philippe Tom regarding a newly diagnosed DCIS with a microinvasive component.     PROBLEM: New diagnosis of left sided DCIS with invasive component     HISTORY OF PRESENT ILLNESS: Ms. Tom is a 52 year old woman from [place], [place] with a new diagnosis of left breast Nuclear Grade 3 ER+(15-20%) DCIS with less than 1 mm of invasive disease as part of workup following routine screening mammogram that detected calcifications. She has not undergone surgical management as of yet, and is establishing care at the [facility] with Medical Oncology, and will be seeing Dr. Noman Diggs for surgical consultation on 1/27/2023.      Her oncologic diagnosis came to light after routine screening mammogram on 12/27/2022 demonstrated indeterminate grouped versus clustered fine microcalcifcations at the 2 o'clock position of the left breast at mid depth. Of note, she does have a history of previous breast biopsies in the right breast on 12/10/2015 that detected fibroadenomas at the 8 o'clock position, 5 cm from the nipple measuring 11 x 6 x 10 mm, and at the 10 o'clock position, 7 cm from the nipple measuring 8 x 4 x 7 mm. Within the 8 o'clock position, rare calcifications were noted adjacent to the fibroadenoma. She also had a diagnostic mammogram for a palpable lump in the left breast on 5/21/2019 at the 2 o'clock position. Focused left breast ultrasound demonstrated an anechoic cyst at the 2 o'clock psition, 8 cm from the nipple measuring 1.7 x 1.2 x 1.5 cm, noted to be benign. An  additional anechoic cyst was noted at 1:30 position, 7 cm from the nipple measurign up to 8 mm and routine yearly mammography was recommended.     She underwent stereotactic guided biopsy at [facility] on 1/10/2023 with pathology (Specimen -[id number]) demonstrating Nuclear Grade 3 ER+ (11-20%, weak) DCIS with central comedonecrosis, with less than 1 mm of invasive disease noted within the specimen.      She was seen by Dr. Kelly Garcia for surgical consultation on 1/19/2023 who discussed surgical options with the patient. Note unavailable in care everywhere    She saw Medical Genetics at [facility] on 1/25/2023, seeing Sasha Chacko. [facility]'s STAT breast panel was sent.      SUMMARY OF OUTSIDE STUDIES:  Patient had bilateral screening mammograms on 12/27/22 and indeterminate grouped versus clustered fine microcalcifications at the 2:00 position of the left breast, middle depth. Recommend correlation with a mediolateral view and spot magnification CC and MLO views of the left breast for further evaluation. Diagnostic imaging followed on 12/30 and clustered microcalcifications in the upper outer aspect of the left breast are suspicious of malignancy. Recommend stereotactically guided biopsy.      1/10/23 - Left breast, stereotactic needle core biopsy at 2:00:  Biopsy at [facility] - pathology report needed.   Left breast, needle core biopsy at 2:00: Ductal carcinoma in situ with   calcifications and at least microinvasion.    Patient states this was left invasive ductal carcinoma,  ER+    <1mm of invasive -- micro  1/19/23 - She did meet with Karley Garcia - consult note needed  Genetic counseling consult scheduled for 1/25 at [facility] - consult note needed.        Breast Cancer Risk Factors:   Breast biopsies: 2 breast biopsies on the right breast that demonstrated fibroadenomas  OCPs: duration of 10 years in college  Menarche: around 7th grade,  estimated at 15 years old  Gestational history: 3 pregnancies, 2 live births at 30 years and 32 years, 1  at 20 years  Currently using Mirena IUD for significant bleeding from fibroids. Estimated LMP is 2023  No first degree relatives with breast or ovarian cancer.  She does have a family history of a maternal great aunt and maternal first cousin with breast cancer.     Menstrual history: Menarche age 15.  First pregnancy age 20.  Ended in .  Pregnancies age 30 and 32 with live births.  She has fibroids has a Mirena IUD with light periods.  She did have a history of heavy menstrual bleeding related to the fibroids, before the Mirena IUD was placed.  No history of hormone replacement therapy.     PMH:   Attention deficit disorder  Allergies  Migraines  Fibroids  The patient does not have a history of low bone density, diabetes mellitus, hyperlipidemia, or hypertension     She has no history of breast surgery, breast cancer in the past, radiation therapy in the past, radiation exposure in the past, tumor of any kind, heart problems, heart attack, breathing problems, blood clots, seizures, arthritis, peptic ulcer disease, osteoporosis, bone fractures.  She is not currently participating in a clinical trial.     PSH:  Intrauterine device insertion     MEDICATIONS:  Cetirizine  Cyclobenzaprine  Fremanezumab-vfrm (Ajovy)  Methylphenidate  Sumatriptan  Tretinoin  Adderall  Rimegepant  Epinephrine 0.3 mg/0.3 mL     ALLERGY:  Bee pollen  Walnuts  Doxycycline  Sulfa  Wasps       Dog hair  - She follows with Dr. Rufus Cardenas at [facility]     FAMILY HISTORY:  Mother with skin cancer on the leg at 65 years  Father with prostate cancer at 62 years old, currently alive, no recurrence [id number]. Lilia 3+4.  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 The last noted menstrual cycle completed on January 22, 2023.      GENETICS:  NF1 [id number]>C ([id number]) possibly mosaic Uncertain Significance     PATHOLOGY:  A. LEFT BREAST, SEED-LOCALIZED LUMPECTOMY:  - INVASIVE DUCTAL CARCINOMA, [place] grade 2, measuring 2.5 mm in greatest extent.  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  - Margins are negative for invasive carcinoma; closest uninvolved anterior margin is 6 mm.  - Margins are negative for DCIS; closest uninvolved inferior and medial margins are 1.5 mm.  - AJCC pathologic staging is pT1a pN0(sn).  - Biopsy marker and biopsy site changes present.  - Fibrocystic change and columnar cell change with associated calcifications.  - Invasive carcinoma is ER (60%, weak) and AL (30%, moderate) positive, HER2 IHC is pending and the result will be reported in an addendum.     B. LEFT BREAST, NEW ANTERIOR MARGIN, EXCISION:  - Benign breast tissue with fibrocystic change, sclerosis adenosis and associated calcifications.  - Negative for atypia or malignancy.     C. LEFT BREAST, NEW LATERAL MARGIN, EXCISION:  - Benign breast tissue with fibrocystic change.  -  Negative for atypia or malignancy.     D. LEFT BREAST, NEW MEDIAL MARGIN, EXCISION:  - Benign breast tissue with fibrocystic change, columnar cell change and associated calcifications.  - Negative for atypia or malignancy.     E. SENTINEL LYMPH NODE, LEFT AXILLA, BIOPSY #1:  - One lymph node, negative for malignancy (0/1).     F. SENTINEL LYMPH NODE, LEFT AXILLA, BIOPSY #2:  - One lymph node, negative for malignancy (0/1).     TREATMENT HISTORY:  A.  Diagnosis with [id number] invasive ductal cancer of the left breast.  - Invasive carcinoma is ER (60%, weak) and LA (30%, moderate) positive,  HER2 negative 1+  B.  Mirana IUD removed.  C.  Lumpectomy  D.  Bilateral mammoplasty breast reduction.  Follow up with Dr. Arnold.  E.  Radiation  Treatment Summary:  Radiation Oncology - Course: 1         Protocol:   Treatment Site            Current Dose   Modality           From    To        Elapsed Days  Fx.  1 Left Breast     4,240 cGy       06 X      4/03/2023        4/28/2023        25       16                                                Dose per Fraction: 265 cGy   Total Dose:      4240 cGy  F.  Hormonal therapy with tamoxifen planned.         COVID-19 vaccination  - She has had her last (4th) booster in November 2022.         INTERVAL HISTORY:     She returns to our clinic for recommendations regarding adjuvant hormonal therapy.  She did have postlumpectomy and post bilateral mammoplasty radiation which was completed April 28.  She has had a cellulitis of the left breast and is on Keflex.  The course of antibiotics will be determined by her plastic surgeon.  She does have pain in the left axilla and left breast with some redness of the skin more related to the radiation then to cellulitis.  She has significant fatigue.  She denies depression or anxiety.  She came to clinic with questions about whether low-dose tamoxifen for 5 years could be considered even though there is a small invasive component.  She was seen with  her .    Overall she has mild pain in the left axilla some fatigue, no depression, no anxiety.     Philippe continues to have regular periods.  Her IUD was removed in February.  She initially had significant bleeding for her period, but her most recent period was quite light.  She has an appointment scheduled with Dr. Kirstin Lion to discuss CLEM-BSO in the future.     REVIEW OF SYSTEMS: A 10 point review of systems was negative.    PHYSICAL EXAM:  /62 (BP Location: Right arm, Patient Position: Sitting, Cuff Size: Adult Regular)   Pulse 79   Temp 97.9  F (36.6  C) (Oral)   Resp 16   Wt 77.9 kg (171 lb 11.2 oz)   SpO2 98%   BMI 28.75 kg/m    General: She appears generally well.  Oropharynx: Good dentition no lesions lymph:  No cervical supraclavicular subclavicular or axillary lymphadenopathy  Breast exam: No masses in the right breast.  The skin of the left breast showed blanching erythema throughout.  There is a bandage over the nipple areolar complex which was not removed.  There is minor skin peeling over the left breast.  There is erythema of the skin over the left axilla.  There was no warmth.  Lungs: Clear to percussion auscultation  Heart: Regular rate and rhythm S1-S2.  Abdomen: Soft nontender without hepatosplenomegaly.  Extremities: Without edema.  Mood and affect were normal.     ASSESSMENT AND PLAN:   1.  Philippe Tom is a 52 year old pre-menopausal woman with a recent diagnosis of a left breast  nuclear grade 3 DCIS with a microinvasive component < 1 mm diagnosed from a stereotactic biopsy after routine screening mammogram revealed calcifications. ECOG 0. She has not undergone surgical management, and will meet with Dr. Diggs tomorrow to discuss surgical options. Given that we do not have the full pathology, as will be delineated after surgical management, we discussed in general what options would be. We also discussed OncotypeDx would only be sent if her invasive component of disease  was 5 mm or greater.  2.  Bilateral mammoplasty reduction with complication of infection, that episode resolved.  3.  Radiation to left breast.  Completed April 28.    4.  Post radiation skin erythema.   I think this is related to radiation with mild blanching erythema.  She is off antibiotics.  5.  Grade 3 DCIS with micro invasive component.  She came to clinic today to have a discussion about adjuvant hormonal therapy.  Although there is a small T1a microinvasive component, she wanted to know whether she could have adjuvant therapy that is more tailored to her DCIS which is estrogen receptor positive.  I discussed with her that this would be a very reasonable approach.  She brought up the paper by Haylee where low-dose tamoxifen of 5 mg/day was shown to be effective as adjuvant therapy for ER positive ductal carcinoma in situ.  After discussion of the risks and potential benefits of this approach we decided jointly to go ahead with 5 years of adjuvant tamoxifen hormonal therapy.  We since then 5 could be worse by tamoxifen and that going with a lower dose tamoxifen could also be helpful in that regard.  She is thinking about a hysterectomy and oophorectomy.  6.  Discussion of the risks and potential benefits of tamoxifen adjuvant hormonal therapy.  We discussed the 0.5 per thousand women per year risk of thrombosis and the 0.5 per thousand women per year risk of endometrial cancer.  She is thinking about having a hysterectomy because she does have fibroids.  She knows to report to us if she has any vaginal bleeding between periods on tamoxifen or worsening of vaginal bleeding with periods.    4.  Consideration of hysterectomy and oophorectomy. This may be an issue, though, with the fibroids and a CLEM-BSO may be a better option for her in the long term.  5.  Discussion of fremanezumab, which is a CGRP inhibitor for migraines, also known as Ajovy.  Philippe asked me whether there might be any interaction between this  migraine medicine and breast cancer risk, and I discussed that I am not aware of it, but I could take a look through the literature.  Reviewing this, there is no link to breast cancer risk that I can find.  She again asked me about cancer risk and I told her that I have been unable to find any information on this issue in part because this is a medication that is not widely used and it takes epidemiological data to find associations between individual medicines and breast cancer risk  6.  Recommendations of exercise of 150 minutes/week based on the lace and pathway study.  7. Recommendation of a Mediterranean style diet low in saturated fat but not low in fat with more fruits and vegetables and less red meat.   8.  Recommendation for bone health includes vitamin D3 2000 international units daily and calcium 1 g/day either by diet or reading labels.  We also recommend bone strengthening exercises including stretch band hand weights and yoga.  9.  Migraines, associated with menses  10.  Follow up.  Follow up with Gillian June 6 with CBC, CMP. Follow up with me September 5 with me with CBC, CMP. Mammogram CBC, CMP November 2 and follow up with me.     Thank you for allowing us to participate in this patient's care.     Sincerely,      Marc Judd MD  Professor  [facility]  [phone]        I spent 50 minutes with the patient more than 50% of which was in counseling and coordination of care.

## 2023-05-05 ENCOUNTER — HOSPITAL ENCOUNTER (OUTPATIENT)
Facility: CLINIC | Age: 53
End: 2023-05-05
Attending: OBSTETRICS & GYNECOLOGY | Admitting: OBSTETRICS & GYNECOLOGY
Payer: COMMERCIAL

## 2023-05-05 ENCOUNTER — ONCOLOGY VISIT (OUTPATIENT)
Dept: ONCOLOGY | Facility: CLINIC | Age: 53
End: 2023-05-05
Attending: OBSTETRICS & GYNECOLOGY
Payer: COMMERCIAL

## 2023-05-05 VITALS
SYSTOLIC BLOOD PRESSURE: 108 MMHG | RESPIRATION RATE: 16 BRPM | HEART RATE: 92 BPM | TEMPERATURE: 97.8 F | OXYGEN SATURATION: 99 % | DIASTOLIC BLOOD PRESSURE: 70 MMHG | HEIGHT: 65 IN | BODY MASS INDEX: 28.16 KG/M2 | WEIGHT: 169 LBS

## 2023-05-05 DIAGNOSIS — D25.1 INTRAMURAL LEIOMYOMA OF UTERUS: ICD-10-CM

## 2023-05-05 DIAGNOSIS — Z17.0 MALIGNANT NEOPLASM OF NIPPLE OF LEFT BREAST IN FEMALE, ESTROGEN RECEPTOR POSITIVE (H): Primary | ICD-10-CM

## 2023-05-05 DIAGNOSIS — N92.0 MENORRHAGIA WITH REGULAR CYCLE: ICD-10-CM

## 2023-05-05 DIAGNOSIS — C50.012 MALIGNANT NEOPLASM OF NIPPLE OF LEFT BREAST IN FEMALE, ESTROGEN RECEPTOR POSITIVE (H): Primary | ICD-10-CM

## 2023-05-05 PROCEDURE — 99214 OFFICE O/P EST MOD 30 MIN: CPT | Mod: 24 | Performed by: OBSTETRICS & GYNECOLOGY

## 2023-05-05 PROCEDURE — G0463 HOSPITAL OUTPT CLINIC VISIT: HCPCS | Performed by: OBSTETRICS & GYNECOLOGY

## 2023-05-05 RX ORDER — METRONIDAZOLE 500 MG/100ML
500 INJECTION, SOLUTION INTRAVENOUS
Status: CANCELLED | OUTPATIENT
Start: 2023-05-05

## 2023-05-05 RX ORDER — CEPHALEXIN 500 MG/1
500 CAPSULE ORAL 3 TIMES DAILY
COMMUNITY
Start: 2023-04-30 | End: 2023-05-10

## 2023-05-05 RX ORDER — HEPARIN SODIUM 5000 [USP'U]/.5ML
5000 INJECTION, SOLUTION INTRAVENOUS; SUBCUTANEOUS
Status: CANCELLED | OUTPATIENT
Start: 2023-05-05

## 2023-05-05 RX ORDER — PHENAZOPYRIDINE HYDROCHLORIDE 200 MG/1
200 TABLET, FILM COATED ORAL ONCE
Status: CANCELLED | OUTPATIENT
Start: 2023-05-05 | End: 2023-05-05

## 2023-05-05 RX ORDER — CEFAZOLIN SODIUM 2 G/50ML
2 SOLUTION INTRAVENOUS
Status: CANCELLED | OUTPATIENT
Start: 2023-05-05

## 2023-05-05 RX ORDER — CEFAZOLIN SODIUM 2 G/50ML
2 SOLUTION INTRAVENOUS SEE ADMIN INSTRUCTIONS
Status: CANCELLED | OUTPATIENT
Start: 2023-05-05

## 2023-05-05 ASSESSMENT — PAIN SCALES - GENERAL: PAINLEVEL: NO PAIN (1)

## 2023-05-05 NOTE — PROGRESS NOTES
GYNECOLOGIC  ONCOLOGY CLINIC NOTE    Referring provider:    Marc Judd MD  [address]  [place] [address]   RE: Philippe Medellin  : 1970  CLARIBEL: May 5, 2023      CC: T1 a NO MX invasive ductal carcinoma    HPI: Ms Philippe Medellin is a 52 year old female who presents for follow up regarding invasive ductal carcinoma of the left breast, estrogen receptor positive. Presenting to discuss plan for ovarian suppression.      Today she wants to discuss the role of CLEM/BSO. After her recent consult with medical oncology, the current decisions is for possible Tamoxifen in adjuvant setting. There was a consideration for total hysterectomy with her gynecologist in the past, symptoms of heavier bleeding and discomfort during her cycles.    She is  ready to proceed with CLEM/BSO at this time after completing her upcoming treatment plan.      2/15/23   Surgery: Seed localized left lumpectomy, SNL left axillary   Path: Invasive ductal carcinoma grade 2, extensive DCIS, margins negative  ER (+60%), MO (+30%), HER2 negtive    23 Consultation with Medical Oncology: discussed adjuvant hormone therapy as ER is weak positive. Consideration of goserlin for ovarian suppression and aromatase inhibitor alternatively performing oophorectomy would remove need for medical hormonal suppression  Under consideration for adjuvant radiation therapy ( consult with Dr. 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, possibly mosaic- not considered a pathogenic variant    23 Pelvic US  Normal Bilateral ovaries, Uterus with fibroids, fundal at  6.8 4.6 cm and lower uterine segment at 2.4 x 2.3 cm, endometrial stripe at 10 mm          OBGYN history and Health Maintenance:    Last Pap Smear:  Neg/HPV negative        Review of Systems:  Systemic:No weight changes.    Skin : No skin changes or new lesions.   Eye : No changes in vision.   Pulmonary: No cough or SOB.   Cardiovascular: No CP or palpitations  Gastrointestinal : No diarrhea, constipation, abdominal pain. Bowel habits normal.   Genitourinary: No dysuria, urgency or bleeding  Psychiatric: No depression or anxiety  Hematologic : No palpable lymph nodes.   Endocrine : No hot flashes. No heat/cold intolerance.      Neurological: No headaches, no numbness.     Past Medical History:   Diagnosis Date     ADD (attention deficit disorder)     diagnosed       Allergies     had allergy testing as a child     Asthma      Breast cancer (H) 2023     Migraine        Past Surgical History:   Procedure Laterality Date     BREAST SURGERY Bilateral 2023    Breast reconstruction on the lt and Bilateral reduction     DENTAL SURGERY       INSERT INTRAUTERINE DEVICE       LUMPECTOMY BREAST, SEED LOCALIZATION, SENTINEL NODE Left 02/15/2023    Procedure: LUMPECTOMY, BREAST, WITH RADIOACTIVE SEED LOCALIZATION AND SENTINEL LYMPH NODE BIOPSY;  Surgeon: Noman Diggs MD;  Location: [facility] OR     [place] LAP IUD REMOVAL            Current Outpatient Medications   Medication Sig Dispense Refill     acetaminophen (TYLENOL) 325 MG tablet Take 2 tablets (650 mg) by mouth every 4 hours as needed for mild pain 50 tablet 0     ammonium lactate (AMLACTIN) 12 % external cream Apply 1 Application topically       Bioflavonoid Products (VITAMIN C) CHEW Take by mouth daily (with lunch) (Patient not taking: Reported on 4/3/2023)       cetirizine (ZYRTEC) 10 MG tablet take 1 tablet (10 mg) by oral route once daily for allergies       cyclobenzaprine (FLEXERIL) 10 MG tablet Take 1 tablet by mouth every evening       EPINEPHrine  (ANY BX GENERIC EQUIV) 0.3 MG/0.3ML injection 2-pack Inject 0.3 mg into the muscle as needed for anaphylaxis May repeat one time in 5-15 minutes if response to initial dose is inadequate. (Patient not taking: Reported on 3/2/2023)       fremanezumab-vfrm (AJOVY) SOSY subcutaneous every 30 days Next dose 2/12/23       ketotifen (ZADITOR) 0.025 % ophthalmic solution        magnesium oxide 200 MG TABS  (Patient not taking: Reported on 4/3/2023)       methylphenidate (CONCERTA) 36 MG CR tablet 2 tablets (Patient not taking: Reported on 3/2/2023)       methylphenidate (RITALIN) 20 MG tablet Take 20 mg by mouth every morning (Patient not taking: Reported on 3/23/2023)       mometasone (ELOCON) 0.1 % external cream Apply topically daily 50 g 1     Multiple Vitamins-Minerals (EYE VITAMINS PO) Take by mouth daily (with lunch)       MULTIVITAMINS OR Take by mouth daily (with lunch)       mupirocin (BACTROBAN) 2 % external ointment Apply topically 3 times daily 30 g 1     PROAIR  (90 BASE) MCG/ACT IN AERS Inhale into the lungs as needed       SUDAFED 30 MG OR TABS Take by mouth as needed       SUMAtriptan (IMITREX) 100 MG tablet Take 1 tablet by mouth at onset of headache       SUMAtriptan (IMITREX) 20 MG/ACT nasal spray Spray 1 spray in nostril as needed       tamoxifen (NOLVADEX) 10 MG tablet Take 0.5 tablets (5 mg) by mouth daily 45 tablet 3     tretinoin (RETIN-A) 0.025 % external cream        UNABLE TO FIND daily (with lunch) MEDICATION NAME: Turkey Tail       Vitamin D (Cholecalciferol) 25 MCG (1000 UT) TABS Take by mouth daily (with lunch)       ZOLMitriptan (ZOMIG-ZMT) 5 MG ODT Take 5 mg by mouth as needed           Allergies   Allergen Reactions     Bee Pollen Anaphylaxis     Other reaction(s): anaphylaxis  BEE Venom       Dog Epithelium Allergy Skin Test Difficulty breathing and Shortness Of Breath     Other reaction(s): difficulty breathing     Walnuts [Nuts] Hives     Doxycycline Rash     Sulfa Antibiotics  "Rash     Sulfamethoxazole-Trimethoprim      Other reaction(s): Unknown     Trimethoprim Rash     Wasp Venom      Other reaction(s): Unknown       Social History:  Social History     Tobacco Use     Smoking status: Never     Smokeless tobacco: Never     Tobacco comments:     no smokers in the household   Vaping Use     Vaping status: Not on file   Substance Use Topics     Alcohol use: Not Currently       Family History:   The patient's family history is notable for   Family History   Problem Relation Age of Onset     Gastrointestinal Disease Mother         irritable bowel     Skin Cancer Mother      Prostate Cancer Father         age 60     Cardiovascular Father         anurysyms     Thyroid Disease Sister         hypothyroid     Thyroid Disease Maternal Grandmother         hypothyroid     Osteoporosis Maternal Grandmother      Cancer Maternal Grandfather         non hodgkins lymphoma     Osteoporosis Paternal Grandmother      Cerebrovascular Disease Paternal Grandmother      Cardiovascular Paternal Grandmother      Venous thrombosis Paternal Grandmother      Prostate Cancer Paternal Grandfather      Allergies Daughter         food     Allergies Son         food     Breast Cancer Maternal Aunt      Breast Cancer Maternal Cousin         1st cousin     Colon Cancer Maternal Uncle      Anesthesia Reaction No family hx of          Physical Exam:     /70 (BP Location: Right arm, Patient Position: Chair, Cuff Size: Adult Large)   Pulse 92   Temp 97.8  F (36.6  C) (Tympanic)   Resp 16   Ht 1.646 m (5' 4.8\")   Wt 76.7 kg (169 lb)   LMP 04/21/2023   SpO2 99%   BMI 28.29 kg/m    Body mass index is 28.29 kg/m .      Constitutional: appears healthy and in no acute distress.  Neurological: alert and oriented x 3. normal gait, no tremor.  Psychological: appropriate mood and affect.  Cardiovascular: RRR, no murmur  Chest: clear  Gastrointestinal: soft, nontender, no hepatosplenomegaly, no masses.  Genitourinary: " normal external genitalia and urethral meatus. Vagina smooth without nodularity or masses. Cervix appears grossly normal without lesions. Bimanual exam reveals normal sized uterus and no apparent adnexal masses. There are no other masses, nodularity or fullness appreciated.  Musculoskeletal: extremities are non-tender and without edema.  Skin: no rashes or lesions.  Lymphatic: no cervical, axillary or inguinal lymphadenopathy.    Assessment: Philippe Medellin is a 52 year old woman with a diagnosis of T1 a NO MX invasive ductal carcinoma, ER positive.   Pre-menopausal status, planning for Tamoxifen in adjuvant setting    She is certainly eligible for laparoscopic CLEM/BSO . We discussed in detail the risks, benefits and approach to surgery. Consent was obtained    She has decided to proceed with surgery, to be schedule in June 2023.      Kirstin Lion M.D., MPH,  F.A.C.O.G.  Division of Gynecologic Oncology  [facility] [place]  [phone]        Time: total time spent today, 5/5/23, including preparation, review of outside records, face to face counseling, and documentation was 25 minutes.

## 2023-05-05 NOTE — NURSING NOTE
"Oncology Rooming Note    May 5, 2023 9:52 AM   Philippe Medellin is a 52 year old female who presents for:    Chief Complaint   Patient presents with     Oncology Clinic Visit     [facility] RETURN - BREAST CANCER     Initial Vitals: /70 (BP Location: Right arm, Patient Position: Chair, Cuff Size: Adult Large)   Pulse 92   Temp 97.8  F (36.6  C) (Tympanic)   Resp 16   Ht 1.646 m (5' 4.8\")   Wt 76.7 kg (169 lb)   LMP 04/21/2023   SpO2 99%   BMI 28.29 kg/m   Estimated body mass index is 28.29 kg/m  as calculated from the following:    Height as of this encounter: 1.646 m (5' 4.8\").    Weight as of this encounter: 76.7 kg (169 lb). Body surface area is 1.87 meters squared.  No Pain (1) Comment: Data Unavailable   Patient's last menstrual period was 04/21/2023.  Allergies reviewed: Yes  Medications reviewed: Yes    Medications: Medication refills not needed today.  Pharmacy name entered into EPIC:    [id number] - [place], [place] - [address]    Doc Gomez LPN            "

## 2023-05-05 NOTE — PATIENT INSTRUCTIONS
Preparation for Surgery:  You will be scheduled for surgery. My  will reach out at her earliest convenience   Please call your PCP for pre-op clearance appt. (Within 30 days of surgery)  NO bowel prep needed     DAY OF SURGERY:  Diet:  NO food or Milk products 8 hours prior to ARRIVAL TIME. You can have WATER up until 2 hours prior to ARRIVAL TIME      The surgical procedure is: HYSTERECTOMY, TOTAL, LAPAROSCOPIC, WITH SALPINGO-OOPHORECTOMY, CYSTOSCOPY      Anticipate: .  You will be in the recovery room for approximately 2-4hrs after surgery.        At discharge you will need a someone to drive you home.    Postoperative Restrictions:    No heavy lifting >15 lbs or strenuous activity for six weeks  Nothing in the vagina (no tampons, no intercourse, no douching) for eight weeks.     Postoperative plan  Decreased appetite is normal, plan to eat 6-8 small meal day, drink at least 6-8 glasses of water per day, there are no dietary restrictions. However, it is recommended that you keep your diet light, simple and small. NOTHING: greasy, spicy, things you know give you gas and carbonation until you are passing gas regularly.     Take stool softener daily as directed  for at least 1-2 weeks unless you develop diarrhea.    Activity as tolerated, reminder to balance rest with activity as you will tire easily while recovering. Using stairs is ok. Walk as much as tolerated, increasing your activity every day.     You may shower 48 hours after surgery, your incision site can get wet/soapy, pat dry.      You will be given ibuprofen and tylenol, take on schedule every 6 hours.  Do not set an alarm to wake yourself up to take the pain medications.  Take consistently for a week  then you can decrease/stop as tolerated.  You will also be given a small dose of narcotics, you may take this in addition to the tylenol/ibuprofen as needed if the pain is not manageable.  do not take this on a schedule.      Ok to resume driving  after surgery after you STOP using narcotics AND FEEL SAFE to handle a vehicle     Wound care:  No need to keep your incision covered. However, you may cover your incision sites if there is drainage or clothing is causing irritation otherwise leave open to the air.  No need to put an creams/ointments on incision site.  Wound will be mildly pink and might have a firm ridge underneath this is normal and will resolve in 4-6 weeks    OTHER: Patients are often constipated after general anesthesia and surgery. The patient should continue to take stool softeners (for example, Senokot-S) for the next six weeks and consume adequate amounts of water. If the patient remains constipated or unable to pass stool, please try one or all of the following measures:    1. Milk of Magnesia 30cc twice a day as needed by mouth  2. Metamucil 2 tablespoons in 12 ounces of fluid  3. Dulcolax oral or suppositories  4. Prunes or prune juice  5. Miralax daily    Call clinic if you have fever greater than 100.5, heavy vaginal bleeding, persistent nausea/vomiting, increasing redness, pain, swelling at incision site, drainage with bad odor or other concerns.      You should be feeling better every day.     PLEASE BE AWARE THAT SOME OF THESE INSTRUCTIONS MAY CHANGE DEPENDING ON THE OUTCOME OF YOUR SURGERY    Jeffery

## 2023-05-05 NOTE — LETTER
2023         RE: Philippe Medellin  [address]  [place] [address]        Dear Colleague,    Thank you for referring your patient, Philippe Medellin, to the [facility]. Please see a copy of my visit note below.    GYNECOLOGIC  ONCOLOGY CLINIC NOTE    Referring provider:    Marc Judd MD  [address] [place] [address]   RE: Philippe Medellin  : 1970  CLARIBEL: May 5, 2023      CC: T1 a NO MX invasive ductal carcinoma    HPI: Ms Philippe Medellin is a 52 year old female who presents for follow up regarding invasive ductal carcinoma of the left breast, estrogen receptor positive. Presenting to discuss plan for ovarian suppression.      Today she wants to discuss the role of CLEM/BSO. After her recent consult with medical oncology, the current decisions is for possible Tamoxifen in adjuvant setting. There was a consideration for total hysterectomy with her gynecologist in the past, symptoms of heavier bleeding and discomfort during her cycles.    She is  ready to proceed with CLEM/BSO at this time after completing her upcoming treatment plan.      2/15/23   Surgery: Seed localized left lumpectomy, SNL left axillary   Path: Invasive ductal carcinoma grade 2, extensive DCIS, margins negative  ER (+60%), NV (+30%), HER2 negtive    23 Consultation with Medical Oncology: discussed adjuvant hormone therapy as ER is weak positive. Consideration of goserlin for ovarian suppression and aromatase inhibitor alternatively performing oophorectomy would remove need for medical hormonal suppression  Under consideration for adjuvant radiation therapy ( consult with Dr. Rodrigues pending)    23   Surgery: Left breast reduction and reconstruction, right breast reduction  Pathology: Benign breast tissue, negative for cancer or atypia    Genetic Test Results  Invitae Breast Cancer STAT Panel, Common Hereditary Cancers Panel, Add-on  Preliminary Evidence Genes for Breast and Gyn Cancers  No Pathogenic Variants were identified  Variant of Uncertain Significance, NF1 c.1586T>C, possibly mosaic- not considered a pathogenic variant    23 Pelvic US  Normal Bilateral ovaries, Uterus with fibroids, fundal at 6.8 4.6 cm and lower uterine segment at 2.4 x 2.3 cm, endometrial stripe at 10 mm          OBGYN history and Health Maintenance:    Last Pap Smear:  Neg/HPV negative        Review of Systems:  Systemic:No weight changes.    Skin : No skin changes or new lesions.   Eye : No changes in vision.   Pulmonary: No cough or SOB.   Cardiovascular: No CP or palpitations  Gastrointestinal : No diarrhea, constipation, abdominal pain. Bowel habits normal.   Genitourinary: No dysuria, urgency or bleeding  Psychiatric: No depression or anxiety  Hematologic : No palpable lymph nodes.   Endocrine : No hot flashes. No heat/cold intolerance.      Neurological: No headaches, no numbness.     Past Medical History:   Diagnosis Date    ADD (attention deficit disorder)     diagnosed      Allergies     had allergy testing as a child    Asthma     Breast cancer (H) 2023    Migraine        Past Surgical History:   Procedure Laterality Date    BREAST SURGERY Bilateral 2023    Breast reconstruction on the lt and Bilateral reduction    DENTAL SURGERY      INSERT INTRAUTERINE DEVICE      LUMPECTOMY BREAST, SEED LOCALIZATION, SENTINEL NODE Left 02/15/2023    Procedure: LUMPECTOMY, BREAST, WITH RADIOACTIVE SEED LOCALIZATION AND SENTINEL LYMPH NODE BIOPSY;  Surgeon: Noman Diggs MD;  Location: [facility] OR    [place] LAP IUD REMOVAL            Current Outpatient Medications   Medication Sig Dispense Refill    acetaminophen (TYLENOL) 325 MG tablet Take 2 tablets (650 mg) by mouth every 4 hours as needed for mild pain 50 tablet 0    ammonium lactate (AMLACTIN) 12 % external cream Apply 1 Application topically      Bioflavonoid Products (VITAMIN C) CHEW Take by mouth  daily (with lunch) (Patient not taking: Reported on 4/3/2023)      cetirizine (ZYRTEC) 10 MG tablet take 1 tablet (10 mg) by oral route once daily for allergies      cyclobenzaprine (FLEXERIL) 10 MG tablet Take 1 tablet by mouth every evening      EPINEPHrine (ANY BX GENERIC EQUIV) 0.3 MG/0.3ML injection 2-pack Inject 0.3 mg into the muscle as needed for anaphylaxis May repeat one time in 5-15 minutes if response to initial dose is inadequate. (Patient not taking: Reported on 3/2/2023)      fremanezumab-vfrm (AJOVY) SOSY subcutaneous every 30 days Next dose 2/12/23      ketotifen (ZADITOR) 0.025 % ophthalmic solution       magnesium oxide 200 MG TABS  (Patient not taking: Reported on 4/3/2023)      methylphenidate (CONCERTA) 36 MG CR tablet 2 tablets (Patient not taking: Reported on 3/2/2023)      methylphenidate (RITALIN) 20 MG tablet Take 20 mg by mouth every morning (Patient not taking: Reported on 3/23/2023)      mometasone (ELOCON) 0.1 % external cream Apply topically daily 50 g 1    Multiple Vitamins-Minerals (EYE VITAMINS PO) Take by mouth daily (with lunch)      MULTIVITAMINS OR Take by mouth daily (with lunch)      mupirocin (BACTROBAN) 2 % external ointment Apply topically 3 times daily 30 g 1    PROAIR  (90 BASE) MCG/ACT IN AERS Inhale into the lungs as needed      SUDAFED 30 MG OR TABS Take by mouth as needed      SUMAtriptan (IMITREX) 100 MG tablet Take 1 tablet by mouth at onset of headache      SUMAtriptan (IMITREX) 20 MG/ACT nasal spray Spray 1 spray in nostril as needed      tamoxifen (NOLVADEX) 10 MG tablet Take 0.5 tablets (5 mg) by mouth daily 45 tablet 3    tretinoin (RETIN-A) 0.025 % external cream       UNABLE TO FIND daily (with lunch) MEDICATION NAME: Turkey Tail      Vitamin D (Cholecalciferol) 25 MCG (1000 UT) TABS Take by mouth daily (with lunch)      ZOLMitriptan (ZOMIG-ZMT) 5 MG ODT Take 5 mg by mouth as needed           Allergies   Allergen Reactions    Bee Pollen Anaphylaxis     " Other reaction(s): anaphylaxis  BEE Venom      Dog Epithelium Allergy Skin Test Difficulty breathing and Shortness Of Breath     Other reaction(s): difficulty breathing    Walnuts [Nuts] Hives    Doxycycline Rash    Sulfa Antibiotics Rash    Sulfamethoxazole-Trimethoprim      Other reaction(s): Unknown    Trimethoprim Rash    Wasp Venom      Other reaction(s): Unknown       Social History:  Social History     Tobacco Use    Smoking status: Never    Smokeless tobacco: Never    Tobacco comments:     no smokers in the household   Vaping Use    Vaping status: Not on file   Substance Use Topics    Alcohol use: Not Currently       Family History:   The patient's family history is notable for   Family History   Problem Relation Age of Onset    Gastrointestinal Disease Mother         irritable bowel    Skin Cancer Mother     Prostate Cancer Father         age 60    Cardiovascular Father         anurysyms    Thyroid Disease Sister         hypothyroid    Thyroid Disease Maternal Grandmother         hypothyroid    Osteoporosis Maternal Grandmother     Cancer Maternal Grandfather         non hodgkins lymphoma    Osteoporosis Paternal Grandmother     Cerebrovascular Disease Paternal Grandmother     Cardiovascular Paternal Grandmother     Venous thrombosis Paternal Grandmother     Prostate Cancer Paternal Grandfather     Allergies Daughter         food    Allergies Son         food    Breast Cancer Maternal Aunt     Breast Cancer Maternal Cousin         1st cousin    Colon Cancer Maternal Uncle     Anesthesia Reaction No family hx of          Physical Exam:     /70 (BP Location: Right arm, Patient Position: Chair, Cuff Size: Adult Large)   Pulse 92   Temp 97.8  F (36.6  C) (Tympanic)   Resp 16   Ht 1.646 m (5' 4.8\")   Wt 76.7 kg (169 lb)   LMP 04/21/2023   SpO2 99%   BMI 28.29 kg/m    Body mass index is 28.29 kg/m .      Constitutional: appears healthy and in no acute distress.  Neurological: alert and oriented x " 3. normal gait, no tremor.  Psychological: appropriate mood and affect.  Cardiovascular: RRR, no murmur  Chest: clear  Gastrointestinal: soft, nontender, no hepatosplenomegaly, no masses.  Genitourinary: normal external genitalia and urethral meatus. Vagina smooth without nodularity or masses. Cervix appears grossly normal without lesions. Bimanual exam reveals normal sized uterus and no apparent adnexal masses. There are no other masses, nodularity or fullness appreciated.  Musculoskeletal: extremities are non-tender and without edema.  Skin: no rashes or lesions.  Lymphatic: no cervical, axillary or inguinal lymphadenopathy.    Assessment: Philippe Medellin is a 52 year old woman with a diagnosis of T1 a NO MX invasive ductal carcinoma, ER positive.   Pre-menopausal status, planning for Tamoxifen in adjuvant setting    She is certainly eligible for laparoscopic CLEM/BSO . We discussed in detail the risks, benefits and approach to surgery. Consent was obtained    She has decided to proceed with surgery, to be schedule in June 2023.      Kirstin Lion M.D., MPH,  F.A.C.O.G.  Division of Gynecologic Oncology  [facility]/[employer] [place]  [phone]        Time: total time spent today, 5/5/23, including preparation, review of outside records, face to face counseling, and documentation was 25 minutes.

## 2023-05-08 ENCOUNTER — TELEPHONE (OUTPATIENT)
Dept: ONCOLOGY | Facility: CLINIC | Age: 53
End: 2023-05-08
Payer: COMMERCIAL

## 2023-05-08 NOTE — TELEPHONE ENCOUNTER
Left voicemail for patient regarding scheduled surgery with Dr. Lion..    Contacted pt to connect regarding surgery date/time and schedule post-op appt.    Provided contact number to discuss.    P: [phone]    __    Keisha Bridges, on 5/8/2023 at 10:47 AM

## 2023-05-09 NOTE — TELEPHONE ENCOUNTER
RN Care Coordinator: Christina Agarwal; [phone]    Surgery is scheduled with Dr. Lion   Date: 6/12/23   Location: [facility]  Scheduled per: surgeon requested date      H&P to be completed by PAC 5/30    Post-op: 6/29, in person visit    Patient will receive a phone call from pre-admission nurses 1-2 days prior to surgery with arrival and start time.     Confirmed with patient via [employer].     Patient questions/concerns: N/A       Surgery packet was provided by [facility]    __    Keisha Bridges, on 5/9/2023 on 2:49 PM  P: [phone]

## 2023-05-16 NOTE — TELEPHONE ENCOUNTER
FUTURE VISIT INFORMATION      SURGERY INFORMATION:    Date: 23    [facility] or    Surgeon:  Kirstin Lion MD    Anesthesia Type:  general    Procedure: HYSTERECTOMY, TOTAL, LAPAROSCOPIC, WITH SALPINGO-OOPHORECTOMY CYSTOSCOPY    Consult: ov     RECORDS REQUESTED FROM:       Primary Care Provider: Freddie Davidson    Most recent EKG+ Tracin23

## 2023-05-18 NOTE — PROGRESS NOTES
Department of Therapeutic Radiology--Radiation Oncology                   [place] Mail [address]  [address] [place]  [address]  Office:  [phone]  Fax:  [phone]   Radiation Oncology Clinic  [address] [place] [address]  Phone:  [phone]  Fax:  [phone]     RE: Philippe Medellin : 1970   MRN: [id number] CLARIBEL: 2023     OUTPATIENT VISIT NOTE       DIAGNOSIS: Invasive ductal carcinoma of the left breast, s/p lumpectomy and SLN biopsy, followed by mammoreduction, pT1aN0(sn), ER+, SC+, HER2-, grade 2    AREAS TREATED: left breast    DOSE:  4240 cGy in 16 fractions     TYPES OF RADIATION GIVEN: 3D conformal radiotherapy with DIBH          INTERVAL SINCE COMPLETION OF RADIATION THERAPY: ~ 1 month (she completed the treatment on 2023)    SUBJECTIVE: Ms. Medellin is a 51 yo female with an invasive carcinoma of the left breast. This was detected on routine screening mammogram as microcalcification. Stereotactic biopsy was consistent with DCIS with at least microinvasion. DCIS was nuclear grade 3 with comedonecrosis and ER weakly positive (11-20%).      On 2/15/2023, Philippe underwent seed-localized lumpectomy and SLN biopsy by Dr. Diggs. Pathology showed invasive ductal carcinoma, Grade 2, measuring 2.5 mm in greatest extent. Associated DCIS was intermediate grade, cribriform and solid types with focal necrosis, measuring up to 2.0 cm.Shaved margins were taken with the final closest invasive margin being 6 mm anterior, DCIS 1.5 mm inferior and medial. Two SLN were both negative. Final stage pT1aN0(sn).  Invasive carcinoma was ER 60% weakly positive, SC 30% moderately positive, HER2 negative by IHC (1+).     Philippe desired breast reduction and proceeded with bilateral reduction mammoplasty by Dr. Arnold on 2023. Pathology was negative for atypia or malignancy.      Post-op, Dr. Judd recommended Goserelin with Exemestane. An Oncotype Dx score was not sent  due to the small size of the invasive component.       Cresencio then proceeded with adjuvant radiation therapy. Her lumpectomy cavity could not be clearly discerned, and therefore a decision was made to forgo a lumpectomy cavity boost.      Very early in her treatment course, she noticed significant erythema of the left breast with fatigue, though no fever. She was evaluated by Dr. Arnold's office and was prescribed a course of antibiotics for presumed cellulitis. She improved clinically. [place] through the radiation therapy, she again started to not feel well. Her breast edema was out of proportion to the expected inflammatory changes. She was evaluated by Dr. Arnold and was initiated on a second course of antibiotics with clinical improvement.     Since completing the radiation therapy, Philippe followed up with Dr. Judd on 5/2/2023. She was recommended Tamoxifen. She saw Dr. Lion to discuss oophrectomies, but ultimately decided to try medical ovarian suppression first. She is scheduled for hysterectomy 6/12/2023, which is primarily for uterine fibroids and the associated heavy menstruation.     Philippe is here today for a routine 1 month follow up visit. On interview, she states that she did have quite significant desquamation in the breast and the axilla. Her skin is much better now. Her cellulitis resolved. She is seeing lymphedema therapy and is fitted for a compression bra. She was also found to have grade 1 lymphedema of the left arm and is currently wearing a sleeve.     She is tolerating Tamoxifen, but does have some questions about Tamoxifen and the upcoming hysterectomy. She is also wondering if she should have her ovaries removed.     Of note, she was informed that her position at [facility] [employer] has been eliminated while she was going through her cancer treatment. She is exploring other options.      OBJECTIVE:   LMP 04/21/2023    /82   Pulse 101   Wt 76.2 kg (168 lb)   LMP 04/21/2023    SpO2 99%   BMI 28.13 kg/m    Gen: Appears well, NAD.   Breasts: Hyperpigmentation of the left breast and axilla. No residual skin breakdown. Mild edema of the left breast.     ASSESSMENT AND PLAN: In summary, Ms. Negro Medellin is a 51 yo female with a pT1aN0(sn) invasive ductal carcinoma of the left breast, s/p lumpectomy and SLN biopsy, s/p mammoreduction and 1 month status post adjuvant radiation therapy. She did develop significant skin reaction from radiation, but is healing appropriately.  I recommend ongoing use of the compression bra and sleeve to minimize lymphedema. We discussed avoiding sun exposure of the irradiated area.     We reviewed Dr. Judd and Dr. iLon's notes and clarified some of her misunderstandings of the endocrine therapy and upcoming surgery. She may consider Goserelin injection to see how she tolerates a low estrogen state before making a decision regarding oophorectomies. She may consider postponing her hysterectomy so that is she were to move ahead with oophorectomies, she can just have one surgical procedure.     I have asked Ms. Negro Medellin to come back for another follow up visit in 6 months.            Jaiden Rodrigues M.D./Ph.D.  Radiation Oncologist   Department of Therapeutic Radiology   [facility]  Phone: [phone]              30 minutes were spent on the date of the encounter doing chart review, history and exam, documentation and further activities as noted above.           Jaiden Rodrigues MD

## 2023-05-25 ENCOUNTER — OFFICE VISIT (OUTPATIENT)
Dept: RADIATION ONCOLOGY | Facility: CLINIC | Age: 53
End: 2023-05-25
Attending: RADIOLOGY
Payer: COMMERCIAL

## 2023-05-25 VITALS
BODY MASS INDEX: 28.13 KG/M2 | WEIGHT: 168 LBS | DIASTOLIC BLOOD PRESSURE: 82 MMHG | SYSTOLIC BLOOD PRESSURE: 137 MMHG | OXYGEN SATURATION: 99 % | HEART RATE: 101 BPM

## 2023-05-25 DIAGNOSIS — Z17.0 MALIGNANT NEOPLASM OF UPPER-OUTER QUADRANT OF LEFT BREAST IN FEMALE, ESTROGEN RECEPTOR POSITIVE (H): Primary | ICD-10-CM

## 2023-05-25 DIAGNOSIS — C50.412 MALIGNANT NEOPLASM OF UPPER-OUTER QUADRANT OF LEFT BREAST IN FEMALE, ESTROGEN RECEPTOR POSITIVE (H): Primary | ICD-10-CM

## 2023-05-25 PROCEDURE — 99024 POSTOP FOLLOW-UP VISIT: CPT | Performed by: RADIOLOGY

## 2023-05-25 PROCEDURE — G0463 HOSPITAL OUTPT CLINIC VISIT: HCPCS | Performed by: RADIOLOGY

## 2023-05-25 NOTE — PROGRESS NOTES
FOLLOW-UP VISIT    Patient Name: Philippe Medellin      : 1970     Age: 52 year old        ______________________________________________________________________________     Chief Complaint   Patient presents with     Breast Cancer     Radiation follow up     /82   Pulse 101   Wt 76.2 kg (168 lb)   LMP 2023   SpO2 99%   BMI 28.13 kg/m       Date Radiation Completed: Radiation Lt breast 4240 cGy finished 23    Pain  Denies    Labs  Other Labs: No    Imaging  None    Other Appointments: No    Residual Radiation side effect: Continues to fatigue, skin has healed but uses arm band for lymphedema.     Additional Instructions: Started Tamoxifen 5 mg daily.    Nurse face-to-face time: Level 3:  10 min face to face time

## 2023-05-25 NOTE — LETTER
2023         RE: Philippe Medellin  [address] [facility] [address]        Dear Colleague,    Thank you for referring your patient, Philippe Medellin, to the [facility] RADIATION ONCOLOGY. Please see a copy of my visit note below.    Department of Therapeutic Radiology--Radiation Oncology                   [place] [address]  [address] [place]  [address]  Office:  [phone]  Fax:  [phone]   Radiation Oncology Clinic  [address] [place] [address]  Phone:  [phone]  Fax:  [phone]     RE: Philippe Medellin : 1970   MRN: [id number] CLARIBEL: 2023     OUTPATIENT VISIT NOTE       DIAGNOSIS: Invasive ductal carcinoma of the left breast, s/p lumpectomy and SLN biopsy, followed by mammoreduction, pT1aN0(sn), ER+, VT+, HER2-, grade 2    AREAS TREATED: left breast    DOSE:  4240 cGy in 16 fractions     TYPES OF RADIATION GIVEN: 3D conformal radiotherapy with DIBH          INTERVAL SINCE COMPLETION OF RADIATION THERAPY: ~ 1 month (she completed the treatment on 2023)    SUBJECTIVE: Ms. Medellin is a 51 yo female with an invasive carcinoma of the left breast. This was detected on routine screening mammogram as microcalcification. Stereotactic biopsy was consistent with DCIS with at least microinvasion. DCIS was nuclear grade 3 with comedonecrosis and ER weakly positive (11-20%).      On 2/15/2023, Philippe underwent seed-localized lumpectomy and SLN biopsy by Dr. Diggs. Pathology showed invasive ductal carcinoma, Grade 2, measuring 2.5 mm in greatest extent. Associated DCIS was intermediate grade, cribriform and solid types with focal necrosis, measuring up to 2.0 cm.Shaved margins were taken with the final closest invasive margin being 6 mm anterior, DCIS 1.5 mm inferior and medial. Two SLN were both negative. Final stage pT1aN0(sn).  Invasive carcinoma was ER 60% weakly positive, VT 30% moderately positive, HER2  negative by IHC (1+).     Philippe desired breast reduction and proceeded with bilateral reduction mammoplasty by Dr. Arnold on 2/24/2023. Pathology was negative for atypia or malignancy.      Post-op, Dr. Judd recommended Goserelin with Exemestane. An Oncotype Dx score was not sent due to the small size of the invasive component.       Cresencio then proceeded with adjuvant radiation therapy. Her lumpectomy cavity could not be clearly discerned, and therefore a decision was made to forgo a lumpectomy cavity boost.      Very early in her treatment course, she noticed significant erythema of the left breast with fatigue, though no fever. She was evaluated by Dr. Arnold's office and was prescribed a course of antibiotics for presumed cellulitis. She improved clinically. [place] through the radiation therapy, she again started to not feel well. Her breast edema was out of proportion to the expected inflammatory changes. She was evaluated by Dr. Arnold and was initiated on a second course of antibiotics with clinical improvement.     Since completing the radiation therapy, Philippe followed up with Dr. Judd on 5/2/2023. She was recommended Tamoxifen. She saw Dr. Lion to discuss oophrectomies, but ultimately decided to try medical ovarian suppression first. She is scheduled for hysterectomy 6/12/2023, which is primarily for uterine fibroids and the associated heavy menstruation.     Philippe is here today for a routine 1 month follow up visit. On interview, she states that she did have quite significant desquamation in the breast and the axilla. Her skin is much better now. Her cellulitis resolved. She is seeing lymphedema therapy and is fitted for a compression bra. She was also found to have grade 1 lymphedema of the left arm and is currently wearing a sleeve.     She is tolerating Tamoxifen, but does have some questions about Tamoxifen and the upcoming hysterectomy. She is also wondering if she should have her ovaries  removed.     Of note, she was informed that her position at [facility] has been eliminated while she was going through her cancer treatment. She is exploring other options.      OBJECTIVE:   LMP 2023    /82   Pulse 101   Wt 76.2 kg (168 lb)   LMP 2023   SpO2 99%   BMI 28.13 kg/m    Gen: Appears well, NAD.   Breasts: Hyperpigmentation of the left breast and axilla. No residual skin breakdown. Mild edema of the left breast.     ASSESSMENT AND PLAN: In summary, Ms. Negro Medellin is a 53 yo female with a pT1aN0(sn) invasive ductal carcinoma of the left breast, s/p lumpectomy and SLN biopsy, s/p mammoreduction and 1 month status post adjuvant radiation therapy. She did develop significant skin reaction from radiation, but is healing appropriately.  I recommend ongoing use of the compression bra and sleeve to minimize lymphedema. We discussed avoiding sun exposure of the irradiated area.     We reviewed Dr. Judd and Dr. Lion's notes and clarified some of her misunderstandings of the endocrine therapy and upcoming surgery. She may consider Goserelin injection to see how she tolerates a low estrogen state before making a decision regarding oophorectomies. She may consider postponing her hysterectomy so that is she were to move ahead with oophorectomies, she can just have one surgical procedure.     I have asked Ms. Negro Medellin to come back for another follow up visit in 6 months.            Jaiden Rodrigues M.D./Ph.D.  Radiation Oncologist   Department of Therapeutic Radiology   [facility]  Phone: [phone]              30 minutes were spent on the date of the encounter doing chart review, history and exam, documentation and further activities as noted above.           Jaiden Rodrigues MD    FOLLOW-UP VISIT    Patient Name: Philippe Medellin      : 1970     Age: 52 year old         ______________________________________________________________________________     Chief Complaint   Patient presents with    Breast Cancer     Radiation follow up     /82   Pulse 101   Wt 76.2 kg (168 lb)   LMP 04/21/2023   SpO2 99%   BMI 28.13 kg/m       Date Radiation Completed: Radiation Lt breast 4240 cGy finished 4/28/23    Pain  Denies    Labs  Other Labs: No    Imaging  None    Other Appointments: No    Residual Radiation side effect: Continues to fatigue, skin has healed but uses arm band for lymphedema.     Additional Instructions: Started Tamoxifen 5 mg daily.    Nurse face-to-face time: Level 3:  10 min face to face time            Again, thank you for allowing me to participate in the care of your patient.        Sincerely,        Jaiden Rodrigues MD

## 2023-05-30 ENCOUNTER — PRE VISIT (OUTPATIENT)
Dept: SURGERY | Facility: CLINIC | Age: 53
End: 2023-05-30

## 2023-06-01 ENCOUNTER — TELEPHONE (OUTPATIENT)
Dept: ONCOLOGY | Facility: CLINIC | Age: 53
End: 2023-06-01
Payer: MEDICAID

## 2023-06-01 NOTE — TELEPHONE ENCOUNTER
Spoke with patient regarding scheduled surgery with Dr. Lion.    Patient called asking to change surgery date from 6/12 to a new surgery date 9/1/23. Patient stated that they were trying a different option recommended by provider first.     Patient was reminded that she would still need to see PCP prior to surgery, pt stated they understood    No further action needed at this time.     __    Keisha Bridges, on 6/1/2023 at 3:45 PM  P: [phone]

## 2023-06-06 NOTE — PROGRESS NOTES
MEDICAL ONCOLOGY FOLLOW UP NOTE     PROBLEM: New diagnosis of left sided DCIS with invasive component     HISTORY OF PRESENT ILLNESS: Ms. Tom is a 52 year old woman from [place], [place] with a diagnosis of left breast Nuclear Grade 3 ER+(15-20%) DCIS with less than 1 mm of invasive disease as part of workup following routine screening mammogram that detected calcifications.      Per prior notes, she had a routine screening mammogram on 12/27/2022 which demonstrated indeterminate grouped versus clustered fine microcalcifcations at the 2 o'clock position of the left breast at mid depth. Of note, she does have a history of previous breast biopsies in the right breast on 12/10/2015 that detected fibroadenomas at the 8 o'clock position, 5 cm from the nipple measuring 11 x 6 x 10 mm, and at the 10 o'clock position, 7 cm from the nipple measuring 8 x 4 x 7 mm. Within the 8 o'clock position, rare calcifications were noted adjacent to the fibroadenoma. She also had a diagnostic mammogram for a palpable lump in the left breast on 5/21/2019 at the 2 o'clock position. Focused left breast ultrasound demonstrated an anechoic cyst at the 2 o'clock psition, 8 cm from the nipple measuring 1.7 x 1.2 x 1.5 cm, noted to be benign. An additional anechoic cyst was noted at 1:30 position, 7 cm from the nipple measurign up to 8 mm and routine yearly mammography was recommended.     She underwent stereotactic guided biopsy at [facility] on 1/10/2023 with pathology (Specimen -[id number]) demonstrating Nuclear Grade 3 ER+ (11-20%, weak) DCIS with central comedonecrosis, with less than 1 mm of invasive disease noted within the specimen.     On 2/15/2023 she underwent seed-localized lumpectomy and SLN biopsy by Dr. Diggs, with pathology showing IDC grade 2, 2.5mm in greatest extent with associated DCIS, intermediate grade measuring up to 2.0 cm.    On 2/24/2023 she had bilateral reduction mammoplasty by   [place], with pathology negative for atypia or malignancy.     Breast Cancer Risk Factors:   Breast biopsies: 2 breast biopsies on the right breast that demonstrated fibroadenomas  OCPs: duration of 10 years in college  Menarche: around 7th grade, estimated at 15 years old  Gestational history: 3 pregnancies, 2 live births at 30 years and 32 years, 1  at 20 years  Currently using Mirena IUD for significant bleeding from fibroids. Estimated LMP is 2023  No first degree relatives with breast or ovarian cancer.  She does have a family history of a maternal great aunt and maternal first cousin with breast cancer.     Menstrual history: Menarche age 15.  First pregnancy age 20.  Ended in .  Pregnancies age 30 and 32 with live births.  She has fibroids has a Mirena IUD with light periods.  She did have a history of heavy menstrual bleeding related to the fibroids, before the Mirena IUD was placed.  No history of hormone replacement therapy.     PMH:   Attention deficit disorder  Allergies  Migraines  Fibroids  The patient does not have a history of low bone density, diabetes mellitus, hyperlipidemia, or hypertension     She has no history of breast surgery, breast cancer in the past, radiation therapy in the past, radiation exposure in the past, tumor of any kind, heart problems, heart attack, breathing problems, blood clots, seizures, arthritis, peptic ulcer disease, osteoporosis, bone fractures.  She is not currently participating in a clinical trial.     PSH:  Intrauterine device insertion     MEDICATIONS:  Cetirizine  Cyclobenzaprine  Fremanezumab-vfrm (Ajovy)  Methylphenidate  Sumatriptan  Tretinoin  Adderall  Rimegepant  Epinephrine 0.3 mg/0.3 mL     ALLERGY:  Bee pollen  Walnuts  Doxycycline  Sulfa  Wasps        Dog hair  - She follows with Dr. Rufus Cardenas at [facility]     FAMILY HISTORY:  Mother with skin cancer on the leg at 65 years  Father with prostate cancer at  62 years old, currently alive, no recurrence [id number]. [place] 3+4.  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   She has two adult children, 20 and 22 years old  Occasional alcohol use  Never smoker  Does not use any other illicits     ECOG PERFORMANCE STATUS: 0     HISTORY OF RADIATION: No  IMPLANTED CARDIAC DEVICE: No  PREGNANCY RISK: She does currently still have regular menstrual periods, but is using a Mirena IUD and has light periods. The last noted menstrual cycle completed on January 22, 2023.      GENETICS:  NF1 [id number] ([id number]) possibly mosaic Uncertain Significance     PATHOLOGY:  A. LEFT BREAST, SEED-LOCALIZED LUMPECTOMY:  - INVASIVE DUCTAL CARCINOMA, [place] grade 2, measuring 2.5 mm in greatest extent.  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  - Margins are negative for invasive carcinoma; closest uninvolved anterior margin is 6 mm.  - Margins are negative for DCIS; closest uninvolved inferior and medial margins are 1.5 mm.  - AJCC pathologic staging is pT1a pN0(sn).  - Biopsy marker and biopsy site changes present.  - Fibrocystic change and columnar cell change with associated calcifications.  - Invasive carcinoma is ER (60%, weak) and MD (30%, moderate) positive, HER2 IHC is pending and the result will be  reported in an addendum.     B. LEFT BREAST, NEW ANTERIOR MARGIN, EXCISION:  - Benign breast tissue with fibrocystic change, sclerosis adenosis and associated calcifications.  - Negative for atypia or malignancy.     C. LEFT BREAST, NEW LATERAL MARGIN, EXCISION:  - Benign breast tissue with fibrocystic change.  - Negative for atypia or malignancy.     D. LEFT BREAST, NEW MEDIAL MARGIN, EXCISION:  - Benign breast tissue with fibrocystic change, columnar cell change and associated calcifications.  - Negative for atypia or malignancy.     E. SENTINEL LYMPH NODE, LEFT AXILLA, BIOPSY #1:  - One lymph node, negative for malignancy (0/1).     F. SENTINEL LYMPH NODE, LEFT AXILLA, BIOPSY #2:  - One lymph node, negative for malignancy (0/1).     TREATMENT HISTORY:  A. Diagnosis with [id number] invasive ductal cancer of the left breast.  - Invasive carcinoma is ER (60%, weak) and [place] (30%, moderate) positive,  HER2 negative 1+  B. Mirana IUD removed.  C. Lumpectomy  D. Bilateral mammoplasty breast reduction.  Follow up with Dr. Arnold.  E. Radiation  Treatment Summary:  Radiation Oncology - Course: 1         Protocol:   Treatment Site            Current Dose   Modality           From    To        Elapsed Days  Fx.  1 Left Breast     4,240 cGy       06 X      4/03/2023        4/28/2023        25       16                                                Dose per Fraction: 265 cGy   Total Dose:      4240 cGy  F. Hormonal therapy with tamoxifen planned.         COVID-19 vaccination  - She has had her last (4th) booster in November 2022.      INTERVAL HISTORY:    She is here today unaccompanied and feeling well overall.  -Tamoxifen is okay.  Started 5/24/2023.  Reports some moodiness at night initially and feeling emotional.   -Sometimes itchy skin at night since starting. As noted above, she does have extensive list of allergies and when itching occurred used Benadryl 25 mg with good effect.  -Cellulitis resolved.  Has used muciprin  abx on topically x 3 days which has helped.  -Can get ring on now, using sleeve on right side.  Has enio doing manual drainage.  -Still undecided on whether her ovaries should some out.  Wondering about a trial of Zoladex, which she discussed with Dr. Thorne.   -Some knee pain on right side lateral aspect.  Old knee injury (step class in 2017, was looked at at  ortho and had a small tear).  Not sure if this is worse since starting Tamoxifen.  -Heavy period from fibroids - saturates multiple pads overnight, last period 5/14/2023.  Again, met with Dr. Thorne to discuss CLEM/BSO.     REVIEW OF SYSTEMS: A 10 point review of systems was negative.    PHYSICAL EXAM:  /62 (BP Location: Right arm, Patient Position: Sitting, Cuff Size: Adult Regular)   Pulse 79   Temp 97.9  F (36.6  C) (Oral)   Resp 16   Wt 77.9 kg (171 lb 11.2 oz)   SpO2 98%   BMI 28.75 kg/m    General: She appears generally well.  Lymph: No cervical supraclavicular subclavicular or axillary lymphadenopathy.  Lungs: Clear to auscultation bilaterally.  Heart: Regular rate and rhythm S1-S2.  Abdomen: Soft nontender without hepatosplenomegaly.  Extremities: Without edema.  Mood and affect were normal.     ASSESSMENT AND PLAN:   1Michelle Tom is a 52 year old pre-menopausal woman with a recent diagnosis of a left breast  nuclear grade 3 DCIS with a microinvasive component 2.5mm  -Post-left lumpectomy, bilateral mammoplasty reduction, and radiation.  -Reviewed that OncotypeDx would only be sent if her invasive component of disease was 5 mm or greater.  -Per prior notes, discussed with MD starting Tamoxifen for DCIS which is HER 2+ and decided on a trial of 5 years of therapy at the 5mg/day dose.  -Tolerating well overall, but may have some itching with this.  No red flags and she will continue to closely monitor.   -She is considering CLEM-BSO and has met with Dr. Thorne.  -Wonders about a trial of Zoladex so she can see what this might feel like  to her.  Also has history of menorrhagia.  Will review with Dr. Judd for his recommendations.    Bone Health:  -150 minutes/week based on the lace and pathway study.  -VItaming D3 and Calcium.     Follow up:  As scheduled with Dr. Judd in September with labs.     JOVANI Rowan, CNP  [facility]  [address] [place] [id number] [phone]

## 2023-06-07 ENCOUNTER — ONCOLOGY VISIT (OUTPATIENT)
Dept: ONCOLOGY | Facility: CLINIC | Age: 53
End: 2023-06-07
Attending: NURSE PRACTITIONER
Payer: MEDICAID

## 2023-06-07 ENCOUNTER — APPOINTMENT (OUTPATIENT)
Dept: LAB | Facility: CLINIC | Age: 53
End: 2023-06-07
Attending: INTERNAL MEDICINE
Payer: MEDICAID

## 2023-06-07 VITALS
HEART RATE: 82 BPM | BODY MASS INDEX: 27.44 KG/M2 | WEIGHT: 164.7 LBS | RESPIRATION RATE: 16 BRPM | TEMPERATURE: 98.1 F | SYSTOLIC BLOOD PRESSURE: 125 MMHG | OXYGEN SATURATION: 98 % | HEIGHT: 65 IN | DIASTOLIC BLOOD PRESSURE: 76 MMHG

## 2023-06-07 DIAGNOSIS — Z17.0 MALIGNANT NEOPLASM OF NIPPLE OF LEFT BREAST IN FEMALE, ESTROGEN RECEPTOR POSITIVE (H): Primary | ICD-10-CM

## 2023-06-07 DIAGNOSIS — C50.012 MALIGNANT NEOPLASM OF NIPPLE OF LEFT BREAST IN FEMALE, ESTROGEN RECEPTOR POSITIVE (H): Primary | ICD-10-CM

## 2023-06-07 LAB
ALBUMIN SERPL BCG-MCNC: 4.1 G/DL (ref 3.5–5.2)
ALP SERPL-CCNC: 52 U/L (ref 35–104)
ALT SERPL W P-5'-P-CCNC: 11 U/L (ref 10–35)
ANION GAP SERPL CALCULATED.3IONS-SCNC: 13 MMOL/L (ref 7–15)
AST SERPL W P-5'-P-CCNC: 17 U/L (ref 10–35)
BASOPHILS # BLD AUTO: 0 10E3/UL (ref 0–0.2)
BASOPHILS NFR BLD AUTO: 1 %
BILIRUB SERPL-MCNC: 0.4 MG/DL
BUN SERPL-MCNC: 13.5 MG/DL (ref 6–20)
CALCIUM SERPL-MCNC: 9 MG/DL (ref 8.6–10)
CHLORIDE SERPL-SCNC: 105 MMOL/L (ref 98–107)
CREAT SERPL-MCNC: 0.66 MG/DL (ref 0.51–0.95)
DEPRECATED HCO3 PLAS-SCNC: 20 MMOL/L (ref 22–29)
EOSINOPHIL # BLD AUTO: 0 10E3/UL (ref 0–0.7)
EOSINOPHIL NFR BLD AUTO: 1 %
ERYTHROCYTE [DISTWIDTH] IN BLOOD BY AUTOMATED COUNT: 13.3 % (ref 10–15)
GFR SERPL CREATININE-BSD FRML MDRD: >90 ML/MIN/1.73M2
GLUCOSE SERPL-MCNC: 92 MG/DL (ref 70–99)
HCT VFR BLD AUTO: 35.4 % (ref 35–47)
HGB BLD-MCNC: 11.4 G/DL (ref 11.7–15.7)
IMM GRANULOCYTES # BLD: 0 10E3/UL
IMM GRANULOCYTES NFR BLD: 0 %
LYMPHOCYTES # BLD AUTO: 0.8 10E3/UL (ref 0.8–5.3)
LYMPHOCYTES NFR BLD AUTO: 18 %
MCH RBC QN AUTO: 25.1 PG (ref 26.5–33)
MCHC RBC AUTO-ENTMCNC: 32.2 G/DL (ref 31.5–36.5)
MCV RBC AUTO: 78 FL (ref 78–100)
MONOCYTES # BLD AUTO: 0.5 10E3/UL (ref 0–1.3)
MONOCYTES NFR BLD AUTO: 12 %
NEUTROPHILS # BLD AUTO: 2.9 10E3/UL (ref 1.6–8.3)
NEUTROPHILS NFR BLD AUTO: 68 %
NRBC # BLD AUTO: 0 10E3/UL
NRBC BLD AUTO-RTO: 0 /100
PLATELET # BLD AUTO: 272 10E3/UL (ref 150–450)
POTASSIUM SERPL-SCNC: 3.8 MMOL/L (ref 3.4–5.3)
PROT SERPL-MCNC: 7.2 G/DL (ref 6.4–8.3)
RBC # BLD AUTO: 4.55 10E6/UL (ref 3.8–5.2)
SODIUM SERPL-SCNC: 138 MMOL/L (ref 136–145)
WBC # BLD AUTO: 4.2 10E3/UL (ref 4–11)

## 2023-06-07 PROCEDURE — 36415 COLL VENOUS BLD VENIPUNCTURE: CPT | Performed by: NURSE PRACTITIONER

## 2023-06-07 PROCEDURE — 80053 COMPREHEN METABOLIC PANEL: CPT | Performed by: NURSE PRACTITIONER

## 2023-06-07 PROCEDURE — G0463 HOSPITAL OUTPT CLINIC VISIT: HCPCS | Performed by: NURSE PRACTITIONER

## 2023-06-07 PROCEDURE — 85025 COMPLETE CBC W/AUTO DIFF WBC: CPT | Performed by: NURSE PRACTITIONER

## 2023-06-07 PROCEDURE — 99214 OFFICE O/P EST MOD 30 MIN: CPT | Performed by: NURSE PRACTITIONER

## 2023-06-07 ASSESSMENT — PAIN SCALES - GENERAL: PAINLEVEL: NO PAIN (0)

## 2023-06-07 NOTE — NURSING NOTE
"Oncology Rooming Note    June 7, 2023 11:25 AM   Philippe Medellin is a 52 year old female who presents for:    Chief Complaint   Patient presents with     Blood Draw     Labs drawn via  by RN in lab.  VS taken      Oncology Clinic Visit     [facility] RETURN - BREAST CANCER     Initial Vitals: /76   Pulse 82   Temp 98.1  F (36.7  C) (Oral)   Resp 16   Ht 1.646 m (5' 4.8\")   Wt 74.7 kg (164 lb 11.2 oz)   LMP 04/21/2023   SpO2 98%   BMI 27.57 kg/m   Estimated body mass index is 27.57 kg/m  as calculated from the following:    Height as of this encounter: 1.646 m (5' 4.8\").    Weight as of this encounter: 74.7 kg (164 lb 11.2 oz). Body surface area is 1.85 meters squared.  No Pain (0) Comment: Data Unavailable   Patient's last menstrual period was 04/21/2023.  Allergies reviewed: Yes  Medications reviewed: Yes    Medications: Medication refills not needed today.  Pharmacy name entered into EPIC:    [id number] - [place], [place] - [address]    Doc Gomez LPN            "

## 2023-06-07 NOTE — LETTER
6/7/2023         RE: Philippe Medellin  [address]  [facility] [address]        Dear Colleague,    Thank you for referring your patient, Philippe Medellin, to the [facility]. Please see a copy of my visit note below.    MEDICAL ONCOLOGY FOLLOW UP NOTE     PROBLEM: New diagnosis of left sided DCIS with invasive component     HISTORY OF PRESENT ILLNESS: Ms. Tom is a 52 year old woman from [place], [place] with a diagnosis of left breast Nuclear Grade 3 ER+(15-20%) DCIS with less than 1 mm of invasive disease as part of workup following routine screening mammogram that detected calcifications.      Per prior notes, she had a routine screening mammogram on 12/27/2022 which demonstrated indeterminate grouped versus clustered fine microcalcifcations at the 2 o'clock position of the left breast at mid depth. Of note, she does have a history of previous breast biopsies in the right breast on 12/10/2015 that detected fibroadenomas at the 8 o'clock position, 5 cm from the nipple measuring 11 x 6 x 10 mm, and at the 10 o'clock position, 7 cm from the nipple measuring 8 x 4 x 7 mm. Within the 8 o'clock position, rare calcifications were noted adjacent to the fibroadenoma. She also had a diagnostic mammogram for a palpable lump in the left breast on 5/21/2019 at the 2 o'clock position. Focused left breast ultrasound demonstrated an anechoic cyst at the 2 o'clock psition, 8 cm from the nipple measuring 1.7 x 1.2 x 1.5 cm, noted to be benign. An additional anechoic cyst was noted at 1:30 position, 7 cm from the nipple measurign up to 8 mm and routine yearly mammography was recommended.     She underwent stereotactic guided biopsy at [facility] on 1/10/2023 with pathology (Specimen -[id number]) demonstrating Nuclear Grade 3 ER+ (11-20%, weak) DCIS with central comedonecrosis, with less than 1 mm of invasive disease noted within the specimen.     On  2/15/2023 she underwent seed-localized lumpectomy and SLN biopsy by Dr. Diggs, with pathology showing IDC grade 2, 2.5mm in greatest extent with associated DCIS, intermediate grade measuring up to 2.0 cm.    On 2023 she had bilateral reduction mammoplasty by Dr. Arnold, with pathology negative for atypia or malignancy.     Breast Cancer Risk Factors:   Breast biopsies: 2 breast biopsies on the right breast that demonstrated fibroadenomas  OCPs: duration of 10 years in college  Menarche: around 7th grade, estimated at 15 years old  Gestational history: 3 pregnancies, 2 live births at 30 years and 32 years, 1  at 20 years  Currently using Mirena IUD for significant bleeding from fibroids. Estimated LMP is 2023  No first degree relatives with breast or ovarian cancer.  She does have a family history of a maternal great aunt and maternal first cousin with breast cancer.     Menstrual history: Menarche age 15.  First pregnancy age 20.  Ended in .  Pregnancies age 30 and 32 with live births.  She has fibroids has a Mirena IUD with light periods.  She did have a history of heavy menstrual bleeding related to the fibroids, before the Mirena IUD was placed.  No history of hormone replacement therapy.     PMH:   Attention deficit disorder  Allergies  Migraines  Fibroids  The patient does not have a history of low bone density, diabetes mellitus, hyperlipidemia, or hypertension     She has no history of breast surgery, breast cancer in the past, radiation therapy in the past, radiation exposure in the past, tumor of any kind, heart problems, heart attack, breathing problems, blood clots, seizures, arthritis, peptic ulcer disease, osteoporosis, bone fractures.  She is not currently participating in a clinical trial.     PSH:  Intrauterine device insertion     MEDICATIONS:  Cetirizine  Cyclobenzaprine  Fremanezumab-vfrm  (Ajovy)  Methylphenidate  Sumatriptan  Tretinoin  Adderall  Rimegepant  Epinephrine 0.3 mg/0.3 mL     ALLERGY:  Bee pollen  Walnuts  Doxycycline  Sulfa  Wasps        Dog hair  - She follows with Dr. Rufus Cardenas at [facility]     FAMILY HISTORY:  Mother with skin cancer on the leg at 65 years  Father with prostate cancer at 62 years old, currently alive, no recurrence [id number]. Lilia 3+4.  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   She has two adult children, 20 and 22 years old  Occasional alcohol use  Never smoker  Does not use any other illicits     ECOG PERFORMANCE STATUS: 0     HISTORY OF RADIATION: No  IMPLANTED CARDIAC DEVICE: No  PREGNANCY RISK: She does currently still have regular menstrual periods, but is using a Mirena IUD and has light periods. The last noted menstrual cycle completed on January 22, 2023.      GENETICS:  NF1 [id number] ([id number]) possibly mosaic Uncertain Significance     PATHOLOGY:  A. LEFT BREAST, SEED-LOCALIZED LUMPECTOMY:  - INVASIVE DUCTAL CARCINOMA, Grover grade 2, measuring 2.5 mm in greatest extent.  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  - Margins are negative for invasive carcinoma; closest uninvolved anterior margin is 6 mm.  - Margins are negative for DCIS; closest  uninvolved inferior and medial margins are 1.5 mm.  - AJCC pathologic staging is pT1a pN0(sn).  - Biopsy marker and biopsy site changes present.  - Fibrocystic change and columnar cell change with associated calcifications.  - Invasive carcinoma is ER (60%, weak) and AL (30%, moderate) positive, HER2 IHC is pending and the result will be reported in an addendum.     B. LEFT BREAST, NEW ANTERIOR MARGIN, EXCISION:  - Benign breast tissue with fibrocystic change, sclerosis adenosis and associated calcifications.  - Negative for atypia or malignancy.     C. LEFT BREAST, NEW LATERAL MARGIN, EXCISION:  - Benign breast tissue with fibrocystic change.  - Negative for atypia or malignancy.     D. LEFT BREAST, NEW MEDIAL MARGIN, EXCISION:  - Benign breast tissue with fibrocystic change, columnar cell change and associated calcifications.  - Negative for atypia or malignancy.     E. SENTINEL LYMPH NODE, LEFT AXILLA, BIOPSY #1:  - One lymph node, negative for malignancy (0/1).     F. SENTINEL LYMPH NODE, LEFT AXILLA, BIOPSY #2:  - One lymph node, negative for malignancy (0/1).     TREATMENT HISTORY:  A. Diagnosis with [id number] invasive ductal cancer of the left breast.  - Invasive carcinoma is ER (60%, weak) and AL (30%, moderate) positive,  HER2 negative 1+  B. Mirana IUD removed.  C. Lumpectomy  D. Bilateral mammoplasty breast reduction.  Follow up with Dr. Arnold.  E. Radiation  Treatment Summary:  Radiation Oncology - Course: 1         Protocol:   Treatment Site            Current Dose   Modality           From    To        Elapsed Days  Fx.  1 Left Breast     4,240 cGy       06 X      4/03/2023        4/28/2023        25       16                                                Dose per Fraction: 265 cGy   Total Dose:      4240 cGy  F. Hormonal therapy with tamoxifen planned.         COVID-19 vaccination  - She has had her last (4th) booster in November 2022.      INTERVAL HISTORY:    She is here today unaccompanied and  feeling well overall.  -Tamoxifen is okay.  Started 5/24/2023.  Reports some moodiness at night initially and feeling emotional.   -Sometimes itchy skin at night since starting. As noted above, she does have extensive list of allergies and when itching occurred used Benadryl 25 mg with good effect.  -Cellulitis resolved.  Has used muciprin abx on topically x 3 days which has helped.  -Can get ring on now, using sleeve on right side.  Has enio doing manual drainage.  -Still undecided on whether her ovaries should some out.  Wondering about a trial of Zoladex, which she discussed with Dr. Thorne.   -Some knee pain on right side lateral aspect.  Old knee injury (step class in 2017, was looked at at  ortho and had a small tear).  Not sure if this is worse since starting Tamoxifen.  -Heavy period from fibroids - saturates multiple pads overnight, last period 5/14/2023.  Again, met with Dr. Thorne to discuss CLEM/BSO.     REVIEW OF SYSTEMS: A 10 point review of systems was negative.    PHYSICAL EXAM:  /62 (BP Location: Right arm, Patient Position: Sitting, Cuff Size: Adult Regular)   Pulse 79   Temp 97.9  F (36.6  C) (Oral)   Resp 16   Wt 77.9 kg (171 lb 11.2 oz)   SpO2 98%   BMI 28.75 kg/m    General: She appears generally well.  Lymph: No cervical supraclavicular subclavicular or axillary lymphadenopathy.  Lungs: Clear to auscultation bilaterally.  Heart: Regular rate and rhythm S1-S2.  Abdomen: Soft nontender without hepatosplenomegaly.  Extremities: Without edema.  Mood and affect were normal.     ASSESSMENT AND PLAN:   1.  Philippe Tom is a 52 year old pre-menopausal woman with a recent diagnosis of a left breast  nuclear grade 3 DCIS with a microinvasive component 2.5mm  -Post-left lumpectomy, bilateral mammoplasty reduction, and radiation.  -Reviewed that OncotypeDx would only be sent if her invasive component of disease was 5 mm or greater.  -Per prior notes, discussed with MD starting Tamoxifen for  DCIS which is HER 2+ and decided on a trial of 5 years of therapy at the 5mg/day dose.  -Tolerating well overall, but may have some itching with this.  No red flags and she will continue to closely monitor.   -She is considering CLEM-BSO and has met with Dr. Thorne.  -Wonders about a trial of Zoladex so she can see what this might feel like to her.  Also has history of menorrhagia.  Will review with Dr. Judd for his recommendations.    Bone Health:  -150 minutes/week based on the lace and pathway study.  -VItaming D3 and Calcium.     Follow up:  As scheduled with Dr. Judd in September with labs.     JOVANI Rowan, CNP  [facility]  [address] [place] [address] [phone]

## 2023-06-07 NOTE — NURSING NOTE
Chief Complaint   Patient presents with     Blood Draw     Labs drawn via  by RN in lab.  VS taken        Labs collected from venipuncture by RN. Vitals taken. Checked in for appointment(s).    Valeria Winters RN

## 2023-06-13 ENCOUNTER — VIRTUAL VISIT (OUTPATIENT)
Dept: PHARMACY | Facility: CLINIC | Age: 53
End: 2023-06-13
Payer: COMMERCIAL

## 2023-06-13 DIAGNOSIS — Z78.9 TAKES DIETARY SUPPLEMENTS: ICD-10-CM

## 2023-06-13 DIAGNOSIS — Z17.0 MALIGNANT NEOPLASM OF UPPER-OUTER QUADRANT OF LEFT BREAST IN FEMALE, ESTROGEN RECEPTOR POSITIVE (H): Primary | ICD-10-CM

## 2023-06-13 DIAGNOSIS — G43.909 MIGRAINE WITHOUT STATUS MIGRAINOSUS, NOT INTRACTABLE, UNSPECIFIED MIGRAINE TYPE: ICD-10-CM

## 2023-06-13 DIAGNOSIS — J45.909 ASTHMA, UNSPECIFIED ASTHMA SEVERITY, UNSPECIFIED WHETHER COMPLICATED, UNSPECIFIED WHETHER PERSISTENT: ICD-10-CM

## 2023-06-13 DIAGNOSIS — J30.2 SEASONAL ALLERGIC RHINITIS, UNSPECIFIED TRIGGER: ICD-10-CM

## 2023-06-13 DIAGNOSIS — L57.0 AK (ACTINIC KERATOSIS): ICD-10-CM

## 2023-06-13 DIAGNOSIS — C50.412 MALIGNANT NEOPLASM OF UPPER-OUTER QUADRANT OF LEFT BREAST IN FEMALE, ESTROGEN RECEPTOR POSITIVE (H): Primary | ICD-10-CM

## 2023-06-13 DIAGNOSIS — F90.9 ATTENTION DEFICIT HYPERACTIVITY DISORDER (ADHD), UNSPECIFIED ADHD TYPE: ICD-10-CM

## 2023-06-13 PROCEDURE — 99607 MTMS BY PHARM ADDL 15 MIN: CPT | Mod: VID | Performed by: PHARMACIST

## 2023-06-13 PROCEDURE — 99605 MTMS BY PHARM NP 15 MIN: CPT | Mod: VID | Performed by: PHARMACIST

## 2023-06-13 NOTE — PROGRESS NOTES
Medication Therapy Management (MTM) Encounter    ASSESSMENT:                            Medication Adherence/Access: Patient could call [facility] patient assistance team to help with medication cost if needed.     Migraine:   Discussed with patient that Ajovy has not been associated with cancer to my knowledge. She wanted to know about CGRP role in cancer. Quick searches some research that CGRP can contribute to cancer development. Patient was relieved that Ajovy would be inhibiting CGRP not adding to it. If she is wanting to try another as needed abortive medication she could consider Reyvow as it is in its own class of medication.     Allergies:   Stable.     ADD:  Stable. Discussed that these medications can also cause migraines.     Keratosis/Acne:  Stable.     Breast Cancer:   Patient should discuss side effects with oncology if too bothersome.     Asthma:  Stable.                 Supplements:  Stable.     PLAN:                            1. Can discuss Reyvow with neurologist.     2. Call the [employer] prescription assistance program at [phone] for help applying for cost assistance.      Follow-up: Return if symptoms worsen or fail to improve.    SUBJECTIVE/OBJECTIVE:                          Philippe Medellin is a 52 year old female contacted via secure video for an initial visit. She was referred to me from [facility].      Reason for visit: CMR2.    Allergies/ADRs: Reviewed in chart  Past Medical History: Reviewed in chart  Tobacco: She reports that she has never smoked. She has never used smokeless tobacco.  Alcohol: none      Medication Adherence/Access: Patient is worried about medication cost if insurance lapses.     Migraine:   Preventive medications  Ajovy 225 mg every 30 days - started in December of 2020 -patient was curious of CGRP role in development of cencer. Looked into an article during visit. She was afraid Ajovy may have contributed to her cancer because of this.       Acute  medications  Sumatriptan 200  Mg and nasal spray,  and Zolmitriptan 5 mg will alternate the triptans.   Acetaminophen 650 mg as needed - rarely and doesn't find it to be super helpful.   Cyclobenzaprine 10 mg nightly - helpful for sleep sometime.       Allergies:   Cetirizine 10 mg daily  Epipen as needed   Ketotifen eye drops - helpful  Sudafed 30 mg as needed     ADD:  Concerta 36 mg twice a day   Ritalin 20 mg every morning - takes if she forgets the Concerta because she doesn't want it to affect her sleep.     Feels that these medications are helpful. Still had migrianes when she was off them.       Keratosis/Acne:  Ammonium lactate cream  Tretinoin 0.025% cream nightly - reports this has been helpful    Breast Cancer:   Tamoxifen 5 mg daily - started may 19th - has been having hot flashes and a lot of itching. Sometimes takes benadryl to help with itching.     Asthma:  ProAir as needed - has been having some shortness of breath recently that she has taken recently. Some of this may have been related to the air quality.     Supplements:  Vitamin D 2000 units daily  Multivitamin daily  Turkey Tail daily - reports that she was told this would help with cancer recurrance.   Magnesium 200 mg daily  Ferrous sulfate slow release    No concerns or questions at this time.     Today's Vitals: LMP 04/21/2023   ----------------      I spent 60 minutes with this patient today. I offer these suggestions for consideration by Hiwot Lr, NP, NP. A copy of the visit note was provided to the patient's provider(s).    A summary of these recommendations was sent via [employer].    Marc Fregoso Pharm. D., BCACP  Medication Therapy Management Pharmacist       Telemedicine Visit Details  Type of service:  Video Conference via [employer]  Start Time: 11 am  End Time: 12 am     Medication Therapy Recommendations  No medication therapy recommendations to display

## 2023-06-14 NOTE — PATIENT INSTRUCTIONS
"Recommendations from today's MTM visit:                                                    MTM (medication therapy management) is a service provided by a clinical pharmacist designed to help you get the most of out of your medicines.   Today we reviewed what your medicines are for, how to know if they are working, that your medicines are safe and how to make your medicine regimen as easy as possible.      1. Can discuss Reyvow with neurologist.     2. Call the [employer] prescription assistance program at [phone] for help applying for cost assistance.    Follow-up: Return if symptoms worsen or fail to improve.    It was great speaking with you today.  I value your experience and would be very thankful for your time in providing feedback in our clinic survey. In the next few days, you may receive an email or text message from [employer] with a link to a survey related to your  clinical pharmacist.\"     To schedule another MTM appointment, please call the clinic directly or you may call the MTM scheduling line at [phone] or toll-free at [phone].     My Clinical Pharmacist's contact information:                                                      Please feel free to contact me with any questions or concerns you have.      Marc Fregoso, Pharm. D., BCACP  Medication Therapy Management Pharmacist    "

## 2023-09-06 ENCOUNTER — PATIENT OUTREACH (OUTPATIENT)
Dept: ONCOLOGY | Facility: CLINIC | Age: 53
End: 2023-09-06
Payer: MEDICAID

## 2023-09-06 NOTE — PROGRESS NOTES
RN was contacted by both the patient and the PAN team     Per PAN:   She has a new diagnosis of NF1 so she is thinking that may be clinically significant and she reports that her bleeding has very much subsided. She would like to postpone in hopes that her fibroids resolve on their own with menopause and is hoping to discuss with the team the likelihood of this happening vs having surgery in the future.     Due to above patient would potentially like to cancel surgery     Patient left RN a VM stating that her bleeding has improved and is not sure she feels comfortable moving forward with surgery and would like a return call     RN reached out to patient but was unable to reach patient. Voicemail with RN direct line left for patient     Aleja Agarwal RN

## 2023-09-10 NOTE — PROGRESS NOTES
MEDICAL ONCOLOGY FOLLOW UP NOTE     Philippe Tom  Female, 52 year old, 1970  MRN:   [id number]           Dear Dr. Diggs,     Thank you for referring Philippe Tom regarding a newly diagnosed DCIS with a microinvasive component.     PROBLEM: New diagnosis of left sided DCIS with invasive component     HISTORY OF PRESENT ILLNESS: Ms. Tom is a 52 year old woman from [place], [place] with a new diagnosis of left breast Nuclear Grade 3 ER+(15-20%) DCIS with less than 1 mm of invasive disease as part of workup following routine screening mammogram that detected calcifications. She has not undergone surgical management as of yet, and is establishing care at the [facility] with Medical Oncology, and will be seeing Dr. Noman Diggs for surgical consultation on 1/27/2023.      Her oncologic diagnosis came to light after routine screening mammogram on 12/27/2022 demonstrated indeterminate grouped versus clustered fine microcalcifcations at the 2 o'clock position of the left breast at mid depth. Of note, she does have a history of previous breast biopsies in the right breast on 12/10/2015 that detected fibroadenomas at the 8 o'clock position, 5 cm from the nipple measuring 11 x 6 x 10 mm, and at the 10 o'clock position, 7 cm from the nipple measuring 8 x 4 x 7 mm. Within the 8 o'clock position, rare calcifications were noted adjacent to the fibroadenoma. She also had a diagnostic mammogram for a palpable lump in the left breast on 5/21/2019 at the 2 o'clock position. Focused left breast ultrasound demonstrated an anechoic cyst at the 2 o'clock psition, 8 cm from the nipple measuring 1.7 x 1.2 x 1.5 cm, noted to be benign. An additional anechoic cyst was noted at 1:30 position, 7 cm from the nipple measurign up to 8 mm and routine yearly mammography was recommended.     She underwent stereotactic guided biopsy at [facility] on 1/10/2023 with pathology (Specimen -[id number])  demonstrating Nuclear Grade 3 ER+ (11-20%, weak) DCIS with central comedonecrosis, with less than 1 mm of invasive disease noted within the specimen.      She was seen by Dr. Kelly Garcia for surgical consultation on 2023 who discussed surgical options with the patient. Note unavailable in care everywhere    She saw Medical Genetics at [facility] on 2023, seeing Sasha Chacko. [facility]'s STAT breast panel was sent.      SUMMARY OF OUTSIDE STUDIES:  Patient had bilateral screening mammograms on 22 and indeterminate grouped versus clustered fine microcalcifications at the 2:00 position of the left breast, middle depth. Recommend correlation with a mediolateral view and spot magnification CC and MLO views of the left breast for further evaluation. Diagnostic imaging followed on  and clustered microcalcifications in the upper outer aspect of the left breast are suspicious of malignancy. Recommend stereotactically guided biopsy.      1/10/23 - Left breast, stereotactic needle core biopsy at 2:00:  Biopsy at [facility] - pathology report needed.   Left breast, needle core biopsy at 2:00: Ductal carcinoma in situ with   calcifications and at least microinvasion.    Patient states this was left invasive ductal carcinoma,  ER+    <1mm of invasive -- micro  23 - She did meet with Karley Garcia - consult note needed  Genetic counseling consult scheduled for  at [facility] - consult note needed.        Breast Cancer Risk Factors:   Breast biopsies: 2 breast biopsies on the right breast that demonstrated fibroadenomas  OCPs: duration of 10 years in college  Menarche: around 7th grade, estimated at 15 years old  Gestational history: 3 pregnancies, 2 live births at 30 years and 32 years, 1  at 20 years  Currently using Mirena IUD for significant bleeding from fibroids. Estimated LMP is 2023  No first degree relatives with breast or ovarian  cancer.  She does have a family history of a maternal great aunt and maternal first cousin with breast cancer.     Menstrual history: Menarche age 15.  First pregnancy age 20.  Ended in .  Pregnancies age 30 and 32 with live births.  She has fibroids has a Mirena IUD with light periods.  She did have a history of heavy menstrual bleeding related to the fibroids, before the Mirena IUD was placed.  No history of hormone replacement therapy.     PMH:   Attention deficit disorder  Allergies  Migraines  Fibroids  The patient does not have a history of low bone density, diabetes mellitus, hyperlipidemia, or hypertension     She has no history of breast surgery, breast cancer in the past, radiation therapy in the past, radiation exposure in the past, tumor of any kind, heart problems, heart attack, breathing problems, blood clots, seizures, arthritis, peptic ulcer disease, osteoporosis, bone fractures.  She is not currently participating in a clinical trial.     PSH:  Intrauterine device insertion     MEDICATIONS:  Cetirizine  Cyclobenzaprine  Fremanezumab-vfrm (Ajovy)  Methylphenidate  Sumatriptan  Tretinoin  Adderall  Rimegepant  Epinephrine 0.3 mg/0.3 mL     ALLERGY:  Bee pollen  Walnuts  Doxycycline  Sulfa  Wasps       Dog hair  - She follows with Dr. Rufus Cardenas at [facility]     FAMILY HISTORY:  Mother with skin cancer on the leg at 65 years  Father with prostate cancer at 62 years old, currently alive, no recurrence [id number]. [place] 3+4.  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 The last noted menstrual cycle completed on January 22, 2023.      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 LEFT BREAST, SEED-LOCALIZED LUMPECTOMY:  - INVASIVE DUCTAL CARCINOMA, Grover grade 2, measuring 2.5 mm in greatest extent.  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  - Margins are negative for invasive carcinoma; closest uninvolved anterior margin is 6 mm.  - Margins are negative for DCIS; closest uninvolved inferior and medial margins are 1.5 mm.  - AJCC pathologic staging is pT1a pN0(sn).  - Biopsy marker and biopsy site changes present.  - Fibrocystic change and columnar cell change with associated calcifications.  - Invasive carcinoma is ER (60%, weak) and LA (30%, moderate) positive, HER2 IHC is pending and the result will be reported in an addendum.     B. LEFT BREAST, NEW ANTERIOR MARGIN, EXCISION:  - Benign breast tissue with fibrocystic change, sclerosis adenosis and associated calcifications.  - Negative for atypia or malignancy.     C. LEFT BREAST, NEW LATERAL MARGIN, EXCISION:  - Benign breast tissue with fibrocystic change.  - Negative for atypia or malignancy.     D. LEFT BREAST, NEW MEDIAL MARGIN, EXCISION:  - Benign breast tissue with fibrocystic change, columnar cell change and associated calcifications.  - Negative for atypia or malignancy.     E. SENTINEL LYMPH NODE, LEFT AXILLA, BIOPSY #1:  - One  lymph node, negative for malignancy (0/1).     F. SENTINEL LYMPH NODE, LEFT AXILLA, BIOPSY #2:  - One lymph node, negative for malignancy (0/1).     TREATMENT HISTORY:  A.  Diagnosis with [id number] invasive ductal cancer of the left breast.  - Invasive carcinoma is ER (60%, weak) and TN (30%, moderate) positive,  HER2 negative 1+  B.  Mirana IUD removed.  C.  Lumpectomy WOODY quadrant.   D.  Bilateral mammoplasty breast reduction.  Follow up with Dr. Arnold.  E.  Radiation  Treatment Summary:  Radiation Oncology - Course: 1         Protocol:   Treatment Site            Current Dose   Modality           From    To        Elapsed Days  Fx.  1 Left Breast     4,240 cGy       06 X                  4/03/2023        4/28/2023        25       16                                                Dose per Fraction: 265 cGy   Total Dose:      4240 cGy  F.  Hormonal therapy with tamoxifen planned.         COVID-19 vaccination  - She has had her last (4th) booster in November 2022.         INTERVAL HISTORY:    Philippe and her  come to clinic for recommendations regarding imaging follow-up as well as implications of NF 1 germline mosaicism.  She continues on low-dose tamoxifen 5 mg/day and is tolerated quite well.  She has a few hot flashes and some problems sleeping.  She has some change in sweat odor and has occasional itchiness but no rashes.  Otherwise she has tolerated the tamoxifen quite well.  She denies pain.  She has noticed that her periods are less heavy on tamoxifen.  She has no bleeding between periods.     REVIEW OF SYSTEMS: A 10 point review of systems was negative.     PHYSICAL EXAM:  /84   Pulse 79   Temp 98.7  F (37.1  C) (Oral)   Resp 16   Wt 73.5 kg (162 lb)   SpO2 99%   BMI 27.12 kg/m    General: She appears generally well.  Oropharynx: Good dentition no lesions lymph:  No cervical supraclavicular subclavicular or axillary lymphadenopathy  Breast exam: Examination of the right breast reveals a  mammoplasty incision at the 6 o'clock position and a circumareolar incision both of which are well-healed without erythema or masses.  There are no masses in the right breast.  Examination left breast reveals a mammoplasty incision at the 6 o'clock position and a circumareolar incision which is well-healed without erythema or masses.  In addition in the left breast at the 2 o'clock position there is a well-healed incision.  The left axillary incisions well-healed without erythema or masses.  There are no masses in either breast.  There are postradiation pigmentation changes and pigmentation of the left breast.  Lungs: Clear to percussion auscultation  Heart: Regular rate and rhythm S1-S2.  Abdomen: Soft nontender without hepatosplenomegaly.  Extremities: Without edema.  Mood and affect were normal.    LABS:  CMP normal.  CBC showed Hb 11.6, MCV 77 and RDW of 16 Absolute neutrophils of 3.8K         ASSESSMENT AND PLAN:   1.  Philippe Tom is a 53 year old pre-menopausal woman with a recent diagnosis of a left breast  nuclear grade 3 DCIS with a microinvasive component < 1 mm diagnosed from a stereotactic biopsy after routine screening mammogram revealed calcifications. ECOG 0.   2.  She underwent left upper outer quadrant lumpectomy and then had bilateral breast reduction. Philippe is seen today approximately 4 months after the completion of radiation.  There are pigmentation changes and skin thickening of the left breast. Radiation to left breast.  Completed April 28.    3.  Adjuvant hormonal therapy. She has tolerated tamoxifen 5 mg/day with some hot flashes and disruption of sleep but otherwise has been tolerating it reasonably well.  I am not sure what to make of the itching without rashes and we will continue to monitor.  She does take Zyrtec with some improvement of symptoms.  Whether the itchiness is directly related to tamoxifen or not is unclear certain.  The plan would be to continue the tamoxifen at 5 mg/day  for a total of 5 years as opposed to 3 years ([facility] paper) and that is since he paper because of the T1 a invasive component of her tumor.    4.  Discussion of the risks and potential benefits of tamoxifen.  We again discussed the risks of endometrial cancer and thrombosis which is about 0.5-1 per thousand women per year for each. She knows to report to us if she has any vaginal bleeding between periods on tamoxifen or vaginal bleeding with periods.    5.  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  We will refer her to Dr. Laura Osei for recommendations.  I do think that intensive surveillance would be reasonable consisting of mammography and MRI each performed yearly and spaced 6 months apart. She understands risks of false positive findings and does want this approach.  I would start with a mammogram 6 months after the completion of radiation which would be in November 2023.  She does have 2 children aged 20 and 22 and I discussed that it may be reasonable to screen them for NF1.  Another question is whether a skin biopsy would be useful for confirming mosaicism for NF1 mutation.  6.  Fibroids.  Notably despite having fibroids her vaginal bleeding is not increased with tamoxifen is rather decreased.  Also it is reassuring that she does not have bleeding between periods.  Colonoscopy was performed at [facility].  I asked her to forward the colonoscopy report to us.  6. Iron deficiency anemia.  May be related to fibroids. Iron could be prescribed for 3 months.   7.  Consideration of hysterectomy and oophorectomy. This may be an issue, though, with the fibroids and a CLEM-BSO may be a better option for her in the long term.  5.  Discussion of fremanezumab, which is a CGRP inhibitor for migraines, also known as Ajovy.  Philippe asked me whether there might be any interaction between this migraine medicine and breast cancer risk, and I discussed that I am not aware of it, but I  could take a look through the literature.  Reviewing this, there is no link to breast cancer risk that I can find.  She again asked me about cancer risk and I told her that I have been unable to find any information on this issue in part because this is a medication that is not widely used and it takes epidemiological data to find associations between individual medicines and breast cancer risk  6.  Recommendations of exercise of 150 minutes/week based on the lace and pathway study.  7. Recommendation of a Mediterranean style diet low in saturated fat but not low in fat with more fruits and vegetables and less red meat.   8.  Recommendation for bone health includes vitamin D3 2000 international units daily and calcium 1 g/day either by diet or reading labels.  We also recommend bone strengthening exercises including stretch band hand weights and yoga.  9.  Migraines, associated with menses.    10.  Colonoscopy at [facility] in April. One polyp  5 years follow up. Sister had 3 polyps.   10.  Follow up.  November 7 with mammogram and CBC, CMP. Genetics consult in next month. Breast MRI May 6 and follow up with me May 7 with CBC, CMP.      Thank you for allowing us to participate in this patient's care.     Sincerely,      Marc Judd MD  Professor  [facility]  [phone]    ADDENDUM:  She has FeSO4 but wasn't taking it.  She will restart it and take Miralax or a stool softener for constipation.      ADDENDUM2:  Intensive surveillance and NF clinic visit discussed with patient.           I spent 50 minutes with the patient more than 50% of which was in counseling and coordination of care.

## 2023-09-12 ENCOUNTER — ONCOLOGY VISIT (OUTPATIENT)
Dept: ONCOLOGY | Facility: CLINIC | Age: 53
End: 2023-09-12
Attending: INTERNAL MEDICINE
Payer: MEDICAID

## 2023-09-12 ENCOUNTER — APPOINTMENT (OUTPATIENT)
Dept: LAB | Facility: CLINIC | Age: 53
End: 2023-09-12
Attending: INTERNAL MEDICINE
Payer: MEDICAID

## 2023-09-12 VITALS
DIASTOLIC BLOOD PRESSURE: 84 MMHG | OXYGEN SATURATION: 99 % | BODY MASS INDEX: 27.12 KG/M2 | WEIGHT: 162 LBS | RESPIRATION RATE: 16 BRPM | TEMPERATURE: 98.7 F | HEART RATE: 79 BPM | SYSTOLIC BLOOD PRESSURE: 121 MMHG

## 2023-09-12 DIAGNOSIS — Z17.0 MALIGNANT NEOPLASM OF NIPPLE OF LEFT BREAST IN FEMALE, ESTROGEN RECEPTOR POSITIVE (H): ICD-10-CM

## 2023-09-12 DIAGNOSIS — C50.012 MALIGNANT NEOPLASM OF NIPPLE OF LEFT BREAST IN FEMALE, ESTROGEN RECEPTOR POSITIVE (H): ICD-10-CM

## 2023-09-12 LAB
ALBUMIN SERPL BCG-MCNC: 4 G/DL (ref 3.5–5.2)
ALP SERPL-CCNC: 42 U/L (ref 35–104)
ALT SERPL W P-5'-P-CCNC: 15 U/L (ref 0–50)
ANION GAP SERPL CALCULATED.3IONS-SCNC: 9 MMOL/L (ref 7–15)
AST SERPL W P-5'-P-CCNC: 21 U/L (ref 0–45)
BASOPHILS # BLD AUTO: 0 10E3/UL (ref 0–0.2)
BASOPHILS NFR BLD AUTO: 0 %
BILIRUB SERPL-MCNC: 0.2 MG/DL
BUN SERPL-MCNC: 11.7 MG/DL (ref 6–20)
CALCIUM SERPL-MCNC: 8.7 MG/DL (ref 8.6–10)
CHLORIDE SERPL-SCNC: 106 MMOL/L (ref 98–107)
CREAT SERPL-MCNC: 0.67 MG/DL (ref 0.51–0.95)
DEPRECATED HCO3 PLAS-SCNC: 24 MMOL/L (ref 22–29)
EGFRCR SERPLBLD CKD-EPI 2021: >90 ML/MIN/1.73M2
EOSINOPHIL # BLD AUTO: 0 10E3/UL (ref 0–0.7)
EOSINOPHIL NFR BLD AUTO: 1 %
ERYTHROCYTE [DISTWIDTH] IN BLOOD BY AUTOMATED COUNT: 16 % (ref 10–15)
GLUCOSE SERPL-MCNC: 89 MG/DL (ref 70–99)
HCT VFR BLD AUTO: 36.7 % (ref 35–47)
HGB BLD-MCNC: 11.6 G/DL (ref 11.7–15.7)
IMM GRANULOCYTES # BLD: 0 10E3/UL
IMM GRANULOCYTES NFR BLD: 0 %
LYMPHOCYTES # BLD AUTO: 0.8 10E3/UL (ref 0.8–5.3)
LYMPHOCYTES NFR BLD AUTO: 15 %
MCH RBC QN AUTO: 24.3 PG (ref 26.5–33)
MCHC RBC AUTO-ENTMCNC: 31.6 G/DL (ref 31.5–36.5)
MCV RBC AUTO: 77 FL (ref 78–100)
MONOCYTES # BLD AUTO: 0.6 10E3/UL (ref 0–1.3)
MONOCYTES NFR BLD AUTO: 11 %
NEUTROPHILS # BLD AUTO: 3.8 10E3/UL (ref 1.6–8.3)
NEUTROPHILS NFR BLD AUTO: 73 %
NRBC # BLD AUTO: 0 10E3/UL
NRBC BLD AUTO-RTO: 0 /100
PLATELET # BLD AUTO: 226 10E3/UL (ref 150–450)
POTASSIUM SERPL-SCNC: 3.9 MMOL/L (ref 3.4–5.3)
PROT SERPL-MCNC: 7 G/DL (ref 6.4–8.3)
RBC # BLD AUTO: 4.77 10E6/UL (ref 3.8–5.2)
SODIUM SERPL-SCNC: 139 MMOL/L (ref 136–145)
WBC # BLD AUTO: 5.3 10E3/UL (ref 4–11)

## 2023-09-12 PROCEDURE — 85025 COMPLETE CBC W/AUTO DIFF WBC: CPT | Performed by: INTERNAL MEDICINE

## 2023-09-12 PROCEDURE — G0463 HOSPITAL OUTPT CLINIC VISIT: HCPCS | Performed by: INTERNAL MEDICINE

## 2023-09-12 PROCEDURE — 36415 COLL VENOUS BLD VENIPUNCTURE: CPT | Performed by: INTERNAL MEDICINE

## 2023-09-12 PROCEDURE — 80053 COMPREHEN METABOLIC PANEL: CPT | Performed by: INTERNAL MEDICINE

## 2023-09-12 PROCEDURE — 99215 OFFICE O/P EST HI 40 MIN: CPT | Performed by: INTERNAL MEDICINE

## 2023-09-12 RX ORDER — FLUCONAZOLE 150 MG/1
150 TABLET ORAL PRN
COMMUNITY
Start: 2023-04-19 | End: 2024-02-15

## 2023-09-12 RX ORDER — SODIUM FLUORIDE 6 MG/ML
PASTE, DENTIFRICE DENTAL 2 TIMES DAILY
COMMUNITY
Start: 2023-06-08

## 2023-09-12 ASSESSMENT — PAIN SCALES - GENERAL: PAINLEVEL: NO PAIN (0)

## 2023-09-12 NOTE — NURSING NOTE
Chief Complaint   Patient presents with    Blood Draw     , VS taken.     Labs drawn with  by rn.  Pt tolerated well.  VS taken.  Pt checked in for next appt.    Edu Johnson RN

## 2023-09-12 NOTE — NURSING NOTE
"Oncology Rooming Note    September 12, 2023 12:00 PM   Philippe Medellin is a 53 year old female who presents for:    Chief Complaint   Patient presents with    Blood Draw     , VS taken.    Breast Cancer     Malignant neoplasm of nipple of left breast, estrogen receptor positive      Initial Vitals: /84   Pulse 79   Temp 98.7  F (37.1  C) (Oral)   Resp 16   Wt 73.5 kg (162 lb)   SpO2 99%   BMI 27.12 kg/m   Estimated body mass index is 27.12 kg/m  as calculated from the following:    Height as of 6/7/23: 1.646 m (5' 4.8\").    Weight as of this encounter: 73.5 kg (162 lb). Body surface area is 1.83 meters squared.  No Pain (0) Comment: Data Unavailable   No LMP recorded.  Allergies reviewed: Yes  Medications reviewed: Yes    Medications: Medication refills not needed today.  Pharmacy name entered into EPIC:    [facility] [place] [place]  [id number] [place], [place] - [address]    Clinical concerns: none       Nataliya Smith              "

## 2023-09-12 NOTE — LETTER
9/12/2023         RE: Pihlippe Medellin  [address]  [facility] [id number]        Dear Colleague,    Thank you for referring your patient, Philippe Medellin, to the [facility]. Please see a copy of my visit note below.    MEDICAL ONCOLOGY FOLLOW UP NOTE     Philippe Tom  Female, 52 year old, 1970  MRN:   [id number]           Dear Dr. Diggs,     Thank you for referring Philippe Tom regarding a newly diagnosed DCIS with a microinvasive component.     PROBLEM: New diagnosis of left sided DCIS with invasive component     HISTORY OF PRESENT ILLNESS: Ms. Tom is a 52 year old woman from [place], [place] with a new diagnosis of left breast Nuclear Grade 3 ER+(15-20%) DCIS with less than 1 mm of invasive disease as part of workup following routine screening mammogram that detected calcifications. She has not undergone surgical management as of yet, and is establishing care at the [facility] with Medical Oncology, and will be seeing Dr. Noman Diggs for surgical consultation on 1/27/2023.      Her oncologic diagnosis came to light after routine screening mammogram on 12/27/2022 demonstrated indeterminate grouped versus clustered fine microcalcifcations at the 2 o'clock position of the left breast at mid depth. Of note, she does have a history of previous breast biopsies in the right breast on 12/10/2015 that detected fibroadenomas at the 8 o'clock position, 5 cm from the nipple measuring 11 x 6 x 10 mm, and at the 10 o'clock position, 7 cm from the nipple measuring 8 x 4 x 7 mm. Within the 8 o'clock position, rare calcifications were noted adjacent to the fibroadenoma. She also had a diagnostic mammogram for a palpable lump in the left breast on 5/21/2019 at the 2 o'clock position. Focused left breast ultrasound demonstrated an anechoic cyst at the 2 o'clock psition, 8 cm from the nipple measuring 1.7 x 1.2 x 1.5 cm, noted to be benign. An  additional anechoic cyst was noted at 1:30 position, 7 cm from the nipple measurign up to 8 mm and routine yearly mammography was recommended.     She underwent stereotactic guided biopsy at [facility] on 1/10/2023 with pathology (Specimen -[id number]) demonstrating Nuclear Grade 3 ER+ (11-20%, weak) DCIS with central comedonecrosis, with less than 1 mm of invasive disease noted within the specimen.      She was seen by Dr. Kelly Garcia for surgical consultation on 1/19/2023 who discussed surgical options with the patient. Note unavailable in care everywhere    She saw Medical Genetics at [facility] on 1/25/2023, seeing Sasha Chacko. [facility]'s STAT breast panel was sent.      SUMMARY OF OUTSIDE STUDIES:  Patient had bilateral screening mammograms on 12/27/22 and indeterminate grouped versus clustered fine microcalcifications at the 2:00 position of the left breast, middle depth. Recommend correlation with a mediolateral view and spot magnification CC and MLO views of the left breast for further evaluation. Diagnostic imaging followed on 12/30 and clustered microcalcifications in the upper outer aspect of the left breast are suspicious of malignancy. Recommend stereotactically guided biopsy.      1/10/23 - Left breast, stereotactic needle core biopsy at 2:00:  Biopsy at [facility] in [place] - pathology report needed.   Left breast, needle core biopsy at 2:00: Ductal carcinoma in situ with   calcifications and at least microinvasion.    Patient states this was left invasive ductal carcinoma,  ER+    <1mm of invasive -- micro  1/19/23 - She did meet with Karley Garcia - consult note needed  Genetic counseling consult scheduled for 1/25 at [facility] - consult note needed.        Breast Cancer Risk Factors:   Breast biopsies: 2 breast biopsies on the right breast that demonstrated fibroadenomas  OCPs: duration of 10 years in college  Menarche: around 7th grade,  estimated at 15 years old  Gestational history: 3 pregnancies, 2 live births at 30 years and 32 years, 1  at 20 years  Currently using Mirena IUD for significant bleeding from fibroids. Estimated LMP is 2023  No first degree relatives with breast or ovarian cancer.  She does have a family history of a maternal great aunt and maternal first cousin with breast cancer.     Menstrual history: Menarche age 15.  First pregnancy age 20.  Ended in .  Pregnancies age 30 and 32 with live births.  She has fibroids has a Mirena IUD with light periods.  She did have a history of heavy menstrual bleeding related to the fibroids, before the Mirena IUD was placed.  No history of hormone replacement therapy.     PMH:   Attention deficit disorder  Allergies  Migraines  Fibroids  The patient does not have a history of low bone density, diabetes mellitus, hyperlipidemia, or hypertension     She has no history of breast surgery, breast cancer in the past, radiation therapy in the past, radiation exposure in the past, tumor of any kind, heart problems, heart attack, breathing problems, blood clots, seizures, arthritis, peptic ulcer disease, osteoporosis, bone fractures.  She is not currently participating in a clinical trial.     PSH:  Intrauterine device insertion     MEDICATIONS:  Cetirizine  Cyclobenzaprine  Fremanezumab-vfrm (Ajovy)  Methylphenidate  Sumatriptan  Tretinoin  Adderall  Rimegepant  Epinephrine 0.3 mg/0.3 mL     ALLERGY:  Bee pollen  Walnuts  Doxycycline  Sulfa  Wasps       Dog hair  - She follows with Dr. Rufus Cardenas at [facility]     FAMILY HISTORY:  Mother with skin cancer on the leg at 65 years  Father with prostate cancer at 62 years old, currently alive, no recurrence [id number]. Lilia 3+4.  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 The last noted menstrual cycle completed on January 22, 2023.      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 LEFT BREAST, SEED-LOCALIZED LUMPECTOMY:  - INVASIVE DUCTAL CARCINOMA, [place] grade 2, measuring 2.5 mm in greatest extent.  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  - Margins are negative for invasive carcinoma; closest uninvolved anterior margin is 6 mm.  - Margins are negative for DCIS; closest uninvolved inferior and medial margins are 1.5 mm.  - AJCC pathologic staging is pT1a pN0(sn).  - Biopsy marker and biopsy site changes present.  - Fibrocystic change and columnar cell change with associated calcifications.  - Invasive carcinoma is ER (60%, weak) and PA (30%, moderate) positive, HER2 IHC is pending and the result will be reported in an addendum.     B. LEFT BREAST, NEW ANTERIOR MARGIN, EXCISION:  - Benign breast tissue with fibrocystic change, sclerosis adenosis and associated calcifications.  - Negative for atypia or malignancy.     C. LEFT BREAST, NEW LATERAL MARGIN, EXCISION:  - Benign breast tissue with fibrocystic change.  -  Negative for atypia or malignancy.     D. LEFT BREAST, NEW MEDIAL MARGIN, EXCISION:  - Benign breast tissue with fibrocystic change, columnar cell change and associated calcifications.  - Negative for atypia or malignancy.     E. SENTINEL LYMPH NODE, LEFT AXILLA, BIOPSY #1:  - One lymph node, negative for malignancy (0/1).     F. SENTINEL LYMPH NODE, LEFT AXILLA, BIOPSY #2:  - One lymph node, negative for malignancy (0/1).     TREATMENT HISTORY:  A.  Diagnosis with [id number] invasive ductal cancer of the left breast.  - Invasive carcinoma is ER (60%, weak) and FL (30%, moderate) positive,  HER2 negative 1+  B.  Mirana IUD removed.  C.  Lumpectomy WOODY quadrant.   D.  Bilateral mammoplasty breast reduction.  Follow up with Dr. Arnold.  E.  Radiation  Treatment Summary:  Radiation Oncology - Course: 1         Protocol:   Treatment Site            Current Dose   Modality           From    To        Elapsed Days  Fx.  1 Left Breast     4,240 cGy       06 X                  4/03/2023        4/28/2023        25       16                                                Dose per Fraction: 265 cGy   Total Dose:      4240 cGy  F.  Hormonal therapy with tamoxifen planned.         COVID-19 vaccination  - She has had her last (4th) booster in November 2022.         INTERVAL HISTORY:    Philippe and her  come to clinic for recommendations regarding imaging follow-up as well as implications of NF 1 germline mosaicism.  She continues on low-dose tamoxifen 5 mg/day and is tolerated quite well.  She has a few hot flashes and some problems sleeping.  She has some change in sweat odor and has occasional itchiness but no rashes.  Otherwise she has tolerated the tamoxifen quite well.  She denies pain.  She has noticed that her periods are less heavy on tamoxifen.  She has no bleeding between periods.     REVIEW OF SYSTEMS: A 10 point review of systems was negative.     PHYSICAL EXAM:  /84   Pulse 79   Temp 98.7  F (37.1  C)  (Oral)   Resp 16   Wt 73.5 kg (162 lb)   SpO2 99%   BMI 27.12 kg/m    General: She appears generally well.  Oropharynx: Good dentition no lesions lymph:  No cervical supraclavicular subclavicular or axillary lymphadenopathy  Breast exam: Examination of the right breast reveals a mammoplasty incision at the 6 o'clock position and a circumareolar incision both of which are well-healed without erythema or masses.  There are no masses in the right breast.  Examination left breast reveals a mammoplasty incision at the 6 o'clock position and a circumareolar incision which is well-healed without erythema or masses.  In addition in the left breast at the 2 o'clock position there is a well-healed incision.  The left axillary incisions well-healed without erythema or masses.  There are no masses in either breast.  There are postradiation pigmentation changes and pigmentation of the left breast.  Lungs: Clear to percussion auscultation  Heart: Regular rate and rhythm S1-S2.  Abdomen: Soft nontender without hepatosplenomegaly.  Extremities: Without edema.  Mood and affect were normal.    LABS:  CMP normal.  CBC showed Hb 11.6, MCV 77 and RDW of 16 Absolute neutrophils of 3.8K         ASSESSMENT AND PLAN:   1.  Philippe Tom is a 53 year old pre-menopausal woman with a recent diagnosis of a left breast  nuclear grade 3 DCIS with a microinvasive component < 1 mm diagnosed from a stereotactic biopsy after routine screening mammogram revealed calcifications. ECOG 0.   2.  She underwent left upper outer quadrant lumpectomy and then had bilateral breast reduction. Philippe is seen today approximately 4 months after the completion of radiation.  There are pigmentation changes and skin thickening of the left breast. Radiation to left breast.  Completed April 28.    3.  Adjuvant hormonal therapy. She has tolerated tamoxifen 5 mg/day with some hot flashes and disruption of sleep but otherwise has been tolerating it reasonably well.  I  am not sure what to make of the itching without rashes and we will continue to monitor.  She does take Zyrtec with some improvement of symptoms.  Whether the itchiness is directly related to tamoxifen or not is unclear certain.  The plan would be to continue the tamoxifen at 5 mg/day for a total of 5 years as opposed to 3 years ([facility] paper) and that is since he paper because of the T1 a invasive component of her tumor.    4.  Discussion of the risks and potential benefits of tamoxifen.  We again discussed the risks of endometrial cancer and thrombosis which is about 0.5-1 per thousand women per year for each. She knows to report to us if she has any vaginal bleeding between periods on tamoxifen or vaginal bleeding with periods.    5.  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  We will refer her to Dr. Laura Osei for recommendations.  I do think that intensive surveillance would be reasonable consisting of mammography and MRI each performed yearly and spaced 6 months apart. She understands risks of false positive findings and does want this approach.  I would start with a mammogram 6 months after the completion of radiation which would be in November 2023.  She does have 2 children aged 20 and 22 and I discussed that it may be reasonable to screen them for NF1.  Another question is whether a skin biopsy would be useful for confirming mosaicism for NF1 mutation.  6.  Fibroids.  Notably despite having fibroids her vaginal bleeding is not increased with tamoxifen is rather decreased.  Also it is reassuring that she does not have bleeding between periods.  Colonoscopy was performed at [facility].  I asked her to forward the colonoscopy report to us.  6. Iron deficiency anemia.  May be related to fibroids. Iron could be prescribed for 3 months.   7.  Consideration of hysterectomy and oophorectomy. This may be an issue, though, with the fibroids and a CLEM-BSO may be a better option  for her in the long term.  5.  Discussion of fremanezumab, which is a CGRP inhibitor for migraines, also known as Ajovy.  Philippe asked me whether there might be any interaction between this migraine medicine and breast cancer risk, and I discussed that I am not aware of it, but I could take a look through the literature.  Reviewing this, there is no link to breast cancer risk that I can find.  She again asked me about cancer risk and I told her that I have been unable to find any information on this issue in part because this is a medication that is not widely used and it takes epidemiological data to find associations between individual medicines and breast cancer risk  6.  Recommendations of exercise of 150 minutes/week based on the lace and pathway study.  7. Recommendation of a Mediterranean style diet low in saturated fat but not low in fat with more fruits and vegetables and less red meat.   8.  Recommendation for bone health includes vitamin D3 2000 international units daily and calcium 1 g/day either by diet or reading labels.  We also recommend bone strengthening exercises including stretch band hand weights and yoga.  9.  Migraines, associated with menses.    10.  Colonoscopy at [facility] in April. One polyp  5 years follow up. Sister had 3 polyps.   10.  Follow up.  November 7 with mammogram and CBC, CMP. Genetics consult in next month. Breast MRI May 6 and follow up with me May 7 with CBC, CMP.      Thank you for allowing us to participate in this patient's care.     Sincerely,      Marc Judd MD  Professor  [facility]  [phone]    ADDENDUM:  She has FeSO4 but wasn't taking it.  She will restart it and take Miralax or a stool softener for constipation.          I spent 50 minutes with the patient more than 50% of which was in counseling and coordination of care.

## 2023-09-22 ENCOUNTER — TELEPHONE (OUTPATIENT)
Dept: ONCOLOGY | Facility: CLINIC | Age: 53
End: 2023-09-22
Payer: MEDICAID

## 2023-10-11 NOTE — PROGRESS NOTES
GYNECOLOGIC  ONCOLOGY CLINIC NOTE    Referring provider:    Marc Judd MD  [address]  [place] [address]   RE: Philippe Medellin  : 1970  CLARIBEL: 10/12/23       CC: T1 a NO MX invasive ductal carcinoma    HPI: Ms Philippe Medellin is a 52 year old female who presents for follow up regarding invasive ductal carcinoma of the left breast, estrogen receptor positive. Presenting to discuss plan for ovarian suppression.    Today, she reports that she cancelled hysterectomy scheduled because her heavy menses have improved quite a bit over the last few months. Last heavy period was in ; since then they have been 4-5 days and much lighter (no longer needing to change pads overnight or bleeding through clothing). Has intermittent fatigue (improved) and mild shortness of breath, no dizziness or lightheadedness. Taking an iron supplement.    Reports that she is tolerating Tamoxifen, just with some occasional hot flashes.    Inquires today about whether hysterectomy previously discussed is still recommended/indicated. 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 Would still offer this surgery should she desire to proceed, but she states does not at this time.      2/15/23   Surgery: Seed localized left lumpectomy, SNL left axillary   Path: Invasive ductal carcinoma grade 2, extensive DCIS, margins negative  ER (+60%), W[place] (+30%), HER2 negtive    23 Consultation with Medical Oncology: discussed adjuvant hormone therapy as ER is weak positive. Consideration of goserlin for ovarian suppression and aromatase inhibitor alternatively performing oophorectomy would remove need for medical hormonal suppression  Under consideration for  adjuvant radiation therapy ( consult with Dr. 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, possibly mosaic- not considered a pathogenic variant    23 Pelvic US  Normal Bilateral ovaries, Uterus with fibroids, fundal at 6.8 4.6 cm and lower uterine segment at 2.4 x 2.3 cm, endometrial stripe at 10 mm    OBGYN history and Health Maintenance:    Last Pap Smear:  Neg/HPV negative      Past Medical History:   Diagnosis Date    ADD (attention deficit disorder)     diagnosed      Allergies     had allergy testing as a child    Asthma     Breast cancer (H) 2023    Migraine        Past Surgical History:   Procedure Laterality Date    BREAST SURGERY Bilateral 2023    Breast reconstruction on the lt and Bilateral reduction    DENTAL SURGERY      INSERT INTRAUTERINE DEVICE      LUMPECTOMY BREAST, SEED LOCALIZATION, SENTINEL NODE Left 02/15/2023    Procedure: LUMPECTOMY, BREAST, WITH RADIOACTIVE SEED LOCALIZATION AND SENTINEL LYMPH NODE BIOPSY;  Surgeon: Noman Diggs MD;  Location: [facility] OR    AL LAP IUD REMOVAL            Current Outpatient Medications   Medication Sig Dispense Refill    acetaminophen (TYLENOL) 325 MG tablet Take 2 tablets (650 mg) by mouth every 4 hours as needed for mild pain 50 tablet 0    ammonium lactate (AMLACTIN) 12 % external cream Apply 1 Application topically      cetirizine (ZYRTEC) 10 MG tablet take 1 tablet (10 mg) by oral route once daily for allergies      cyclobenzaprine (FLEXERIL) 10 MG tablet Take 1 tablet by mouth every evening      EPINEPHrine (ANY BX GENERIC EQUIV) 0.3 MG/0.3ML injection 2-pack Inject 0.3 mg  into the muscle as needed for anaphylaxis May repeat one time in 5-15 minutes if response to initial dose is inadequate. (Patient not taking: Reported on 3/2/2023)      ferrous sulfate (SLO-FE) 142 (45 Fe) MG CR tablet Take 142 mg by mouth daily (Patient not taking: Reported on 9/12/2023)      fluconazole (DIFLUCAN) 150 MG tablet Take 150 mg by mouth as needed      fremanezumab-vfrm (AJOVY) SOSY subcutaneous every 30 days Next dose 2/12/23      ketotifen (ZADITOR) 0.025 % ophthalmic solution Place 1 drop into both eyes as needed      magnesium oxide 200 MG TABS Take 200 mg by mouth daily      methylphenidate (CONCERTA) 36 MG CR tablet Take 36 mg by mouth 2 times daily      methylphenidate (RITALIN) 20 MG tablet Take 20 mg by mouth every morning      MULTIVITAMINS OR Take by mouth daily (with lunch)      PROAIR  (90 BASE) MCG/ACT IN AERS Inhale into the lungs as needed      Rimegepant Sulfate (NURTEC PO) Take 75 mg by mouth as needed (Patient not taking: Reported on 9/12/2023)      SODIUM FLUORIDE 5000 PPM 1.1 % PSTE dental paste Apply to affected area 2 times daily      SUMAtriptan (IMITREX) 100 MG tablet Take 2 tablets by mouth at onset of headache for migraine      SUMAtriptan (IMITREX) 20 MG/ACT nasal spray Spray 1 spray in nostril as needed      tamoxifen (NOLVADEX) 10 MG tablet Take 0.5 tablets (5 mg) by mouth daily 45 tablet 3    tretinoin (RETIN-A) 0.025 % external cream Apply topically At Bedtime      UNABLE TO FIND daily (with lunch) MEDICATION NAME: Turkey Tail      Vitamin D (Cholecalciferol) 50 MCG (2000 UT) CAPS Take 1 tablet by mouth daily      ZOLMitriptan (ZOMIG-ZMT) 5 MG ODT Take 5 mg by mouth as needed (Patient not taking: Reported on 9/12/2023)           Allergies   Allergen Reactions    Bee Pollen Anaphylaxis     Other reaction(s): anaphylaxis  BEE Venom      Dog Epithelium Allergy Skin Test Difficulty breathing and Shortness Of Breath     Other reaction(s): difficulty breathing    Walnuts  [Nuts] Hives    Doxycycline Rash    Sulfa Antibiotics Rash    Sulfamethoxazole-Trimethoprim      Other reaction(s): Unknown    Trimethoprim Rash    Wasp Venom      Other reaction(s): Unknown       Social History:  Social History     Tobacco Use    Smoking status: Never    Smokeless tobacco: Never    Tobacco comments:     no smokers in the household   Substance Use Topics    Alcohol use: Not Currently       Family History:   The patient's family history is notable for   Family History   Problem Relation Age of Onset    Gastrointestinal Disease Mother         irritable bowel    Skin Cancer Mother     Prostate Cancer Father         age 60    Cardiovascular Father         anurysyms    Thyroid Disease Sister         hypothyroid    Thyroid Disease Maternal Grandmother         hypothyroid    Osteoporosis Maternal Grandmother     Cancer Maternal Grandfather         non hodgkins lymphoma    Osteoporosis Paternal Grandmother     Cerebrovascular Disease Paternal Grandmother     Cardiovascular Paternal Grandmother     Venous thrombosis Paternal Grandmother     Prostate Cancer Paternal Grandfather     Allergies Daughter         food    Allergies Son         food    Breast Cancer Maternal Aunt     Breast Cancer Maternal Cousin         1st cousin    Colon Cancer Maternal Uncle     Anesthesia Reaction No family hx of          Physical Exam:     /73 (BP Location: Right arm, Patient Position: Sitting, Cuff Size: Adult Regular)   Pulse 81   Temp 97.6  F (36.4  C) (Oral)   Wt 73.8 kg (162 lb 12.8 oz)   SpO2 98%   BMI 27.26 kg/m    There is no height or weight on file to calculate BMI.      Constitutional: appears healthy and in no acute distress.  Neurological: alert and oriented x 3. normal gait, no tremor.      Assessment: Philippe Medellin is a 53 year old woman with a diagnosis of T1 a NO MX invasive ductal carcinoma, ER positive.   Pre-menopausal status, on Tamoxifen in adjuvant setting.    She is tolerating  Tamoxifen, menstrual cycles have improved. Continue with current management. Surgery is not recommended at this time.      Recommend establishing care with primary GYN for ongoing care should AUB recur; consider repeat pelvic US to monitor fibroids and EMS spring 2024.    Kirstin Lion M.D., MPH,  F.A.C.O.G.  Division of Gynecologic Oncology  [facility]  [phone]        Time: total time spent today, 10/12/23 , including preparation, review of outside records, face to face counseling, and documentation was 25 minutes.

## 2023-10-12 ENCOUNTER — ONCOLOGY VISIT (OUTPATIENT)
Dept: ONCOLOGY | Facility: CLINIC | Age: 53
End: 2023-10-12
Attending: OBSTETRICS & GYNECOLOGY
Payer: MEDICAID

## 2023-10-12 VITALS
BODY MASS INDEX: 27.26 KG/M2 | WEIGHT: 162.8 LBS | DIASTOLIC BLOOD PRESSURE: 73 MMHG | OXYGEN SATURATION: 98 % | TEMPERATURE: 97.6 F | HEART RATE: 81 BPM | SYSTOLIC BLOOD PRESSURE: 120 MMHG

## 2023-10-12 DIAGNOSIS — D25.1 INTRAMURAL LEIOMYOMA OF UTERUS: ICD-10-CM

## 2023-10-12 DIAGNOSIS — N93.8 DYSFUNCTIONAL UTERINE BLEEDING: Primary | ICD-10-CM

## 2023-10-12 PROCEDURE — 99214 OFFICE O/P EST MOD 30 MIN: CPT | Performed by: OBSTETRICS & GYNECOLOGY

## 2023-10-12 PROCEDURE — G0463 HOSPITAL OUTPT CLINIC VISIT: HCPCS | Performed by: OBSTETRICS & GYNECOLOGY

## 2023-10-12 ASSESSMENT — PAIN SCALES - GENERAL: PAINLEVEL: NO PAIN (0)

## 2023-10-12 NOTE — LETTER
10/12/2023         RE: Philippe Medellin  [address]  [facility] [address]        Dear Colleague,    Thank you for referring your patient, Philippe Medellin, to the [facility]. Please see a copy of my visit note below.    GYNECOLOGIC  ONCOLOGY CLINIC NOTE    Referring provider:    Marc Judd MD  [address] [facility] [address]  [place] [address]   RE: Philippe Medellin  : 1970  CLARIBEL: 10/12/23       CC: T1 a NO MX invasive ductal carcinoma    HPI: Ms Philippe Medellin is a 52 year old female who presents for follow up regarding invasive ductal carcinoma of the left breast, estrogen receptor positive. Presenting to discuss plan for ovarian suppression.    Today, she reports that she cancelled hysterectomy scheduled because her heavy menses have improved quite a bit over the last few months. Last heavy period was in ; since then they have been 4-5 days and much lighter (no longer needing to change pads overnight or bleeding through clothing). Has intermittent fatigue (improved) and mild shortness of breath, no dizziness or lightheadedness. Taking an iron supplement.    Reports that she is tolerating Tamoxifen, just with some occasional hot flashes.    Inquires today about whether hysterectomy previously discussed is still recommended/indicated. 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 Would still offer this surgery should she desire to proceed, but she states does not at this time.      2/15/23   Surgery: Seed localized left lumpectomy, SNL left axillary   Path: Invasive ductal carcinoma grade 2, extensive DCIS, margins negative  ER (+60%), RI (+30%), HER2 negtive    23  Consultation with Medical Oncology: discussed adjuvant hormone therapy as ER is weak positive. Consideration of goserlin for ovarian suppression and aromatase inhibitor alternatively performing oophorectomy would remove need for medical hormonal suppression  Under consideration for adjuvant radiation therapy ( consult with Dr. 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, possibly mosaic- not considered a pathogenic variant    23 Pelvic US  Normal Bilateral ovaries, Uterus with fibroids, fundal at 6.8 4.6 cm and lower uterine segment at 2.4 x 2.3 cm, endometrial stripe at 10 mm    OBGYN history and Health Maintenance:    Last Pap Smear:  Neg/HPV negative      Past Medical History:   Diagnosis Date    ADD (attention deficit disorder)     diagnosed      Allergies     had allergy testing as a child    Asthma     Breast cancer (H) 2023    Migraine        Past Surgical History:   Procedure Laterality Date    BREAST SURGERY Bilateral 2023    Breast reconstruction on the lt and Bilateral reduction    DENTAL SURGERY      INSERT INTRAUTERINE DEVICE      LUMPECTOMY BREAST, SEED LOCALIZATION, SENTINEL NODE Left 02/15/2023    Procedure: LUMPECTOMY, BREAST, WITH RADIOACTIVE SEED LOCALIZATION AND SENTINEL LYMPH NODE BIOPSY;  Surgeon: Noman Diggs MD;  Location: [facility] OR    IL LAP IUD REMOVAL            Current Outpatient Medications   Medication Sig Dispense Refill    acetaminophen (TYLENOL) 325 MG tablet Take 2 tablets (650 mg) by mouth every 4 hours as needed for mild pain 50 tablet 0    ammonium lactate (AMLACTIN) 12 % external cream Apply 1  Application topically      cetirizine (ZYRTEC) 10 MG tablet take 1 tablet (10 mg) by oral route once daily for allergies      cyclobenzaprine (FLEXERIL) 10 MG tablet Take 1 tablet by mouth every evening      EPINEPHrine (ANY BX GENERIC EQUIV) 0.3 MG/0.3ML injection 2-pack Inject 0.3 mg into the muscle as needed for anaphylaxis May repeat one time in 5-15 minutes if response to initial dose is inadequate. (Patient not taking: Reported on 3/2/2023)      ferrous sulfate (SLO-FE) 142 (45 Fe) MG CR tablet Take 142 mg by mouth daily (Patient not taking: Reported on 9/12/2023)      fluconazole (DIFLUCAN) 150 MG tablet Take 150 mg by mouth as needed      fremanezumab-vfrm (AJOVY) SOSY subcutaneous every 30 days Next dose 2/12/23      ketotifen (ZADITOR) 0.025 % ophthalmic solution Place 1 drop into both eyes as needed      magnesium oxide 200 MG TABS Take 200 mg by mouth daily      methylphenidate (CONCERTA) 36 MG CR tablet Take 36 mg by mouth 2 times daily      methylphenidate (RITALIN) 20 MG tablet Take 20 mg by mouth every morning      MULTIVITAMINS OR Take by mouth daily (with lunch)      PROAIR  (90 BASE) MCG/ACT IN AERS Inhale into the lungs as needed      Rimegepant Sulfate (NURTEC PO) Take 75 mg by mouth as needed (Patient not taking: Reported on 9/12/2023)      SODIUM FLUORIDE 5000 PPM 1.1 % PSTE dental paste Apply to affected area 2 times daily      SUMAtriptan (IMITREX) 100 MG tablet Take 2 tablets by mouth at onset of headache for migraine      SUMAtriptan (IMITREX) 20 MG/ACT nasal spray Spray 1 spray in nostril as needed      tamoxifen (NOLVADEX) 10 MG tablet Take 0.5 tablets (5 mg) by mouth daily 45 tablet 3    tretinoin (RETIN-A) 0.025 % external cream Apply topically At Bedtime      UNABLE TO FIND daily (with lunch) MEDICATION NAME: Turkey Tail      Vitamin D (Cholecalciferol) 50 MCG (2000 UT) CAPS Take 1 tablet by mouth daily      ZOLMitriptan (ZOMIG-ZMT) 5 MG ODT Take 5 mg by mouth as needed  (Patient not taking: Reported on 9/12/2023)           Allergies   Allergen Reactions    Bee Pollen Anaphylaxis     Other reaction(s): anaphylaxis  BEE Venom      Dog Epithelium Allergy Skin Test Difficulty breathing and Shortness Of Breath     Other reaction(s): difficulty breathing    Walnuts [Nuts] Hives    Doxycycline Rash    Sulfa Antibiotics Rash    Sulfamethoxazole-Trimethoprim      Other reaction(s): Unknown    Trimethoprim Rash    Wasp Venom      Other reaction(s): Unknown       Social History:  Social History     Tobacco Use    Smoking status: Never    Smokeless tobacco: Never    Tobacco comments:     no smokers in the household   Substance Use Topics    Alcohol use: Not Currently       Family History:   The patient's family history is notable for   Family History   Problem Relation Age of Onset    Gastrointestinal Disease Mother         irritable bowel    Skin Cancer Mother     Prostate Cancer Father         age 60    Cardiovascular Father         anurysyms    Thyroid Disease Sister         hypothyroid    Thyroid Disease Maternal Grandmother         hypothyroid    Osteoporosis Maternal Grandmother     Cancer Maternal Grandfather         non hodgkins lymphoma    Osteoporosis Paternal Grandmother     Cerebrovascular Disease Paternal Grandmother     Cardiovascular Paternal Grandmother     Venous thrombosis Paternal Grandmother     Prostate Cancer Paternal Grandfather     Allergies Daughter         food    Allergies Son         food    Breast Cancer Maternal Aunt     Breast Cancer Maternal Cousin         1st cousin    Colon Cancer Maternal Uncle     Anesthesia Reaction No family hx of          Physical Exam:     /73 (BP Location: Right arm, Patient Position: Sitting, Cuff Size: Adult Regular)   Pulse 81   Temp 97.6  F (36.4  C) (Oral)   Wt 73.8 kg (162 lb 12.8 oz)   SpO2 98%   BMI 27.26 kg/m    There is no height or weight on file to calculate BMI.      Constitutional: appears healthy and in no  acute distress.  Neurological: alert and oriented x 3. normal gait, no tremor.      Assessment: Philippe Medellin is a 53 year old woman with a diagnosis of T1 a NO MX invasive ductal carcinoma, ER positive.   Pre-menopausal status, on Tamoxifen in adjuvant setting.    She is tolerating Tamoxifen, menstrual cycles have improved. Continue with current management. Surgery is not recommended at this time.      Recommend establishing care with primary GYN for ongoing care should AUB recur; consider repeat pelvic US to monitor fibroids and EMS spring 2024.    Kirstin Lion M.D., MPH,  F.A.C.O.G.  Division of Gynecologic Oncology  [facility] [place]  [phone]        Time: total time spent today, 10/12/23 , including preparation, review of outside records, face to face counseling, and documentation was 25 minutes.

## 2023-11-04 NOTE — PROGRESS NOTES
MEDICAL ONCOLOGY FOLLOW UP NOTE     Philippe Tom  Female, 52 year old, 1970  MRN:   [id number]           Dear Dr. Diggs,     Thank you for referring Philippe Tom regarding a newly diagnosed DCIS with a microinvasive component.     PROBLEM: New diagnosis of left sided DCIS with invasive component     HISTORY OF PRESENT ILLNESS: Ms. Tom is a 52 year old woman from [place], [place] with a new diagnosis of left breast Nuclear Grade 3 ER+(15-20%) DCIS with less than 1 mm of invasive disease as part of workup following routine screening mammogram that detected calcifications. She has not undergone surgical management as of yet, and is establishing care at the [facility] with Medical Oncology, and will be seeing Dr. Noman Diggs for surgical consultation on 1/27/2023.      Her oncologic diagnosis came to light after routine screening mammogram on 12/27/2022 demonstrated indeterminate grouped versus clustered fine microcalcifcations at the 2 o'clock position of the left breast at mid depth. Of note, she does have a history of previous breast biopsies in the right breast on 12/10/2015 that detected fibroadenomas at the 8 o'clock position, 5 cm from the nipple measuring 11 x 6 x 10 mm, and at the 10 o'clock position, 7 cm from the nipple measuring 8 x 4 x 7 mm. Within the 8 o'clock position, rare calcifications were noted adjacent to the fibroadenoma. She also had a diagnostic mammogram for a palpable lump in the left breast on 5/21/2019 at the 2 o'clock position. Focused left breast ultrasound demonstrated an anechoic cyst at the 2 o'clock psition, 8 cm from the nipple measuring 1.7 x 1.2 x 1.5 cm, noted to be benign. An additional anechoic cyst was noted at 1:30 position, 7 cm from the nipple measurign up to 8 mm and routine yearly mammography was recommended.     She underwent stereotactic guided biopsy at [facility] on 1/10/2023 with pathology (Specimen -[id number])  demonstrating Nuclear Grade 3 ER+ (11-20%, weak) DCIS with central comedonecrosis, with less than 1 mm of invasive disease noted within the specimen.      She was seen by Dr. Kelly Garcia for surgical consultation on 2023 who discussed surgical options with the patient. Note unavailable in care everywhere    She saw Medical Genetics at [facility] on 2023, seeing Sasha Chacko. [facility]'s STAT breast panel was sent.      SUMMARY OF OUTSIDE STUDIES:  Patient had bilateral screening mammograms on 22 and indeterminate grouped versus clustered fine microcalcifications at the 2:00 position of the left breast, middle depth. Recommend correlation with a mediolateral view and spot magnification CC and MLO views of the left breast for further evaluation. Diagnostic imaging followed on  and clustered microcalcifications in the upper outer aspect of the left breast are suspicious of malignancy. Recommend stereotactically guided biopsy.      1/10/23 - Left breast, stereotactic needle core biopsy at 2:00:  [facility]opsy [facility] - pathology report needed.   Left breast, needle core biopsy at 2:00: Ductal carcinoma in situ with   calcifications and at least microinvasion.    Patient states this was left invasive ductal carcinoma,  ER+    <1mm of invasive -- micro  23 - She did meet with Karley Garcia - consult note needed  Genetic counseling consult scheduled for  at [facility] - consult note needed.        Breast Cancer Risk Factors:   Breast biopsies: 2 breast biopsies on the right breast that demonstrated fibroadenomas  OCPs: duration of 10 years in college  Menarche: around 7th grade, estimated at 15 years old  Gestational history: 3 pregnancies, 2 live births at 30 years and 32 years, 1  at 20 years  Currently using Mirena IUD for significant bleeding from fibroids. Estimated LMP is 2023  No first degree relatives with breast or ovarian  cancer.  She does have a family history of a maternal great aunt and maternal first cousin with breast cancer.     Menstrual history: Menarche age 15.  First pregnancy age 20.  Ended in .  Pregnancies age 30 and 32 with live births.  She has fibroids has a Mirena IUD with light periods.  She did have a history of heavy menstrual bleeding related to the fibroids, before the Mirena IUD was placed.  No history of hormone replacement therapy.     PMH:   Attention deficit disorder  Allergies  Migraines  Fibroids  The patient does not have a history of low bone density, diabetes mellitus, hyperlipidemia, or hypertension     She has no history of breast surgery, breast cancer in the past, radiation therapy in the past, radiation exposure in the past, tumor of any kind, heart problems, heart attack, breathing problems, blood clots, seizures, arthritis, peptic ulcer disease, osteoporosis, bone fractures.  She is not currently participating in a clinical trial.     PSH:  Intrauterine device insertion     MEDICATIONS:  Cetirizine  Cyclobenzaprine  Fremanezumab-vfrm (Ajovy)  Methylphenidate  Sumatriptan  Tretinoin  Adderall  Rimegepant  Epinephrine 0.3 mg/0.3 mL     ALLERGY:  Bee pollen  Walnuts  Doxycycline  Sulfa  Wasps       Dog hair  - She follows with Dr. Rufus Cardenas at [facility]     FAMILY HISTORY:  Mother with skin cancer on the leg at 65 years  Father with prostate cancer at 62 years old, currently alive, no recurrence lP6H8H4. [place] 3+4.  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 The last noted menstrual cycle completed on January 22, 2023.      GENETICS:  NF1 c.1586T>C ([id number]) possibly mosaic Uncertain Significance     PATHOLOGY:  A. LEFT BREAST, SEED-LOCALIZED LUMPECTOMY:  - INVASIVE DUCTAL CARCINOMA, Grover grade 2, measuring 2.5 mm in greatest extent.  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  - Margins are negative for invasive carcinoma; closest uninvolved anterior margin is 6 mm.  - Margins are negative for DCIS; closest uninvolved inferior and medial margins are 1.5 mm.  - AJCC pathologic staging is pT1a pN0(sn).  - Biopsy marker and biopsy site changes present.  - Fibrocystic change and columnar cell change with associated calcifications.  - Invasive carcinoma is ER (60%, weak) and WA (30%, moderate) positive, HER2 IHC is pending and the result will be reported in an addendum.     B. LEFT BREAST, NEW ANTERIOR MARGIN, EXCISION:  - Benign breast tissue with fibrocystic change, sclerosis adenosis and associated calcifications.  - Negative for atypia or malignancy.     C. LEFT BREAST, NEW LATERAL MARGIN, EXCISION:  - Benign breast tissue with fibrocystic change.  - Negative for atypia or malignancy.     D. LEFT BREAST, NEW MEDIAL MARGIN, EXCISION:  - Benign breast tissue with fibrocystic change, columnar cell change and associated calcifications.  - Negative for atypia or malignancy.     E. SENTINEL LYMPH NODE, LEFT AXILLA, BIOPSY #1:  - One  lymph node, negative for malignancy (0/1).     F. SENTINEL LYMPH NODE, LEFT AXILLA, BIOPSY #2:  - One lymph node, negative for malignancy (0/1).     TREATMENT HISTORY:  A.  Diagnosis with [id number] invasive ductal cancer of the left breast.  - Invasive carcinoma is ER (60%, weak) and VT (30%, moderate) positive,  HER2 negative 1+  B.  Mirana IUD removed.  C.  Lumpectomy WOODY quadrant.   D.  Bilateral mammoplasty breast reduction.  Follow up with Dr. Arnold.  E.  Radiation  Treatment Summary:  Radiation Oncology - Course: 1         Protocol:   Treatment Site            Current Dose   Modality           From    To        Elapsed Days  Fx.  1 Left Breast     4,240 cGy       06 X                  4/03/2023        4/28/2023        25       16                                                Dose per Fraction: 265 cGy   Total Dose:      4240 cGy  F.  Hormonal therapy with tamoxifen planned.   G.  Stopped femanezumab which she was taking for migraines.  Migraines are better.   H.  Continuing on tamoxifen 5 mg.          COVID-19 vaccination  - She has had her last (4th) booster in November 2022.         INTERVAL HISTORY:    Philippe and her  come to clinic for recommendations regarding imaging follow-up as well as implications of NF 1 germline mosaicism.  She continues on low-dose tamoxifen 5 mg/day     Periods are now regular.  She reports that her periods are lighter.    No significant pain, fatigue, depression, anxiety.  She does have hot flashes.  She does have some minor joint stiffness.  The stiffness is in both knees.  Mammogram today showed benign findings.  She does have asthma.     REVIEW OF SYSTEMS: A 10 point review of systems was negative.     PHYSICAL EXAM  VITAL SIGNS: /75 (BP Location: Right arm, Patient Position: Sitting, Cuff Size: Adult Regular)   Pulse 95   Temp 97.8  F (36.6  C) (Oral)   Resp 16   Wt 74.1 kg (163 lb 4.8 oz)   SpO2 98%   BMI 27.34 kg/m    GENERAL:  Philippe appeared generally  well. She has no alopecia.  HEENT:  Examination of the oropharynx is without lesions.  LYMPH:  There is no palpable cervical, supraclavicular, subclavicular or axillary lymphadenopathy.  BREASTS: No masses in the right breast.  There is a right breast mammoplasty incision is well-healed without erythema or masses.  Its circumareolar and at the 6 o'clock position.  Examination left breast reveals a mild mammoplasty incision that is well-healed without erythema or masses.  The incision is circumareolar and at the 6 o'clock position.  There is a left lumpectomy incision 4 fingerbreadths from nipple areolar complex at the 230 o'clock position near the anterior axillary line.  This incision is well-healed without erythema or masses.  Left axillary incisions well-healed without erythema or masses.  Nipples are everted.  LUNGS:  Clear to percussion and auscultation.  HEART:  There is a regular rate and rhythm.  S1, S2.  ABDOMEN:  Soft, nontender, without hepatosplenomegaly.  EXTREMITIES:  Without edema.  PSYCH:  Mood and affect were normal.     LABS:  CMP normal except for K 3.3.  CBC shows normalization of Hb and MCV but both borderline low.         ASSESSMENT AND PLAN:   1.  Philippe Tom is a 53 year old pre-menopausal woman with a recent diagnosis of a left breast  nuclear grade 3 DCIS with a microinvasive component < 1 mm diagnosed from a stereotactic biopsy after routine screening mammogram revealed calcifications. ECOG 0.   2.  She underwent left upper outer quadrant lumpectomy and then had bilateral breast reduction. Philippe is seen today approximately 4 months after the completion of radiation.  There are pigmentation changes and skin thickening of the left breast. Radiation to left breast.  Completed April 28.    3.  Adjuvant hormonal therapy. She has tolerated tamoxifen 5 mg/day with some hot flashes and disruption of sleep but otherwise has been tolerating it reasonably well.  I am not sure what to make of the  itching without rashes and we will continue to monitor.  She does take Zyrtec with some improvement of symptoms.  Whether the itchiness is directly related to tamoxifen or not is unclear certain.  The plan would be to continue the tamoxifen at 5 mg/day for a total of 5 years as opposed to 3 years (Haylee paper) and that is since he paper because of the T1 a invasive component of her tumor.    4.  Discussion of the risks and potential benefits of tamoxifen.  We again discussed the risks of endometrial cancer and thrombosis which is about 0.5-1 per thousand women per year for each. She knows to report to us if she has any vaginal bleeding between periods on tamoxifen or vaginal bleeding with periods.  She has had no bleeding between periods.  5.  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  We will refer her to Dr. Laura Osei for recommendations.  I do think that intensive surveillance would be reasonable consisting of mammography and MRI each performed yearly and spaced 6 months apart. She understands risks of false positive findings and does want this approach.  I would start with a mammogram 6 months after the completion of radiation which would be in November 2023.  She does have 2 children aged 20 and 22 and I discussed that it may be reasonable to screen them for NF1.  She has a genetics consultation scheduled in about 1 month.  7.  Intensive surveillance because of NF1 mutation.  Mammogram showed benign findings today.  MRI in 6 months.   6.  Fibroids.  Notably despite having fibroids her vaginal bleeding is not increased with tamoxifen is rather decreased.  Also it is reassuring that she does not have bleeding between periods.  Colonoscopy was performed at [facility].  I asked her to forward the colonoscopy report to us.  6. Iron deficiency anemia.  May be related to fibroids. Iron could be prescribed for 3 months.  Still taking iron.  MCV 81 and Hb is back up to 12 but we  will continue Fe for another 3 months.  Periods have become normalized recently and have not been heavy.  There was an interval of 2 weeks between.  Just recently but now the periods are monthly and regular.  7.  Consideration of hysterectomy and oophorectomy.  She is not interested in hysterectomy or oophorectomy at the present time.  6.  Recommendations of exercise of 150 minutes/week based on the lace and pathway study.  7. Recommendation of a Mediterranean style diet low in saturated fat but not low in fat with more fruits and vegetables and less red meat.   8.  Recommendation for bone health includes vitamin D3 2000 international units daily and calcium 1 g/day either by diet or reading labels.  We also recommend bone strengthening exercises including stretch band hand weights and yoga.  9.  Migraines, associated with menses.    10.  Colonoscopy at [facility] in April. One polyp  5 years follow up.   10.  Follow up.  Follow-up with Gillian  Feb. 1 with check of CBC and CMP.  May 6 with breast MRI CBC, CMP.  Follow up with me May 7.  Follow-up with Gillian August 8 with CBC, CMP.  Follow up with me  in November 12 with mammogram CBC and CMP.     Thank you for allowing us to participate in this patient's care.     Sincerely,      Marc Judd MD  Professor  [facility]  [phone]              I spent 40 minutes with the patient more than 50% of which was in counseling and coordination of care.

## 2023-11-07 ENCOUNTER — APPOINTMENT (OUTPATIENT)
Dept: LAB | Facility: CLINIC | Age: 53
End: 2023-11-07
Attending: INTERNAL MEDICINE
Payer: MEDICAID

## 2023-11-07 ENCOUNTER — ANCILLARY PROCEDURE (OUTPATIENT)
Dept: MAMMOGRAPHY | Facility: CLINIC | Age: 53
End: 2023-11-07
Attending: INTERNAL MEDICINE
Payer: MEDICAID

## 2023-11-07 ENCOUNTER — ONCOLOGY VISIT (OUTPATIENT)
Dept: ONCOLOGY | Facility: CLINIC | Age: 53
End: 2023-11-07
Attending: INTERNAL MEDICINE
Payer: MEDICAID

## 2023-11-07 VITALS
WEIGHT: 163.3 LBS | OXYGEN SATURATION: 98 % | SYSTOLIC BLOOD PRESSURE: 119 MMHG | BODY MASS INDEX: 27.34 KG/M2 | RESPIRATION RATE: 16 BRPM | DIASTOLIC BLOOD PRESSURE: 75 MMHG | TEMPERATURE: 97.8 F | HEART RATE: 95 BPM

## 2023-11-07 DIAGNOSIS — Z17.0 MALIGNANT NEOPLASM OF UPPER-OUTER QUADRANT OF LEFT BREAST IN FEMALE, ESTROGEN RECEPTOR POSITIVE (H): ICD-10-CM

## 2023-11-07 DIAGNOSIS — D50.8 OTHER IRON DEFICIENCY ANEMIA: Primary | ICD-10-CM

## 2023-11-07 DIAGNOSIS — C50.012 MALIGNANT NEOPLASM OF NIPPLE OF LEFT BREAST IN FEMALE, ESTROGEN RECEPTOR POSITIVE (H): ICD-10-CM

## 2023-11-07 DIAGNOSIS — Z17.0 MALIGNANT NEOPLASM OF NIPPLE OF LEFT BREAST IN FEMALE, ESTROGEN RECEPTOR POSITIVE (H): ICD-10-CM

## 2023-11-07 DIAGNOSIS — C50.412 MALIGNANT NEOPLASM OF UPPER-OUTER QUADRANT OF LEFT BREAST IN FEMALE, ESTROGEN RECEPTOR POSITIVE (H): ICD-10-CM

## 2023-11-07 LAB
ALBUMIN SERPL BCG-MCNC: 3.9 G/DL (ref 3.5–5.2)
ALP SERPL-CCNC: 48 U/L (ref 35–104)
ALT SERPL W P-5'-P-CCNC: 9 U/L (ref 0–50)
ANION GAP SERPL CALCULATED.3IONS-SCNC: 8 MMOL/L (ref 7–15)
AST SERPL W P-5'-P-CCNC: 20 U/L (ref 0–45)
BASOPHILS # BLD AUTO: 0 10E3/UL (ref 0–0.2)
BASOPHILS NFR BLD AUTO: 1 %
BILIRUB SERPL-MCNC: 0.3 MG/DL
BUN SERPL-MCNC: 12.3 MG/DL (ref 6–20)
CALCIUM SERPL-MCNC: 8.7 MG/DL (ref 8.6–10)
CHLORIDE SERPL-SCNC: 106 MMOL/L (ref 98–107)
CREAT SERPL-MCNC: 0.66 MG/DL (ref 0.51–0.95)
DEPRECATED HCO3 PLAS-SCNC: 27 MMOL/L (ref 22–29)
EGFRCR SERPLBLD CKD-EPI 2021: >90 ML/MIN/1.73M2
EOSINOPHIL # BLD AUTO: 0.1 10E3/UL (ref 0–0.7)
EOSINOPHIL NFR BLD AUTO: 3 %
ERYTHROCYTE [DISTWIDTH] IN BLOOD BY AUTOMATED COUNT: 18.8 % (ref 10–15)
GLUCOSE SERPL-MCNC: 73 MG/DL (ref 70–99)
HCT VFR BLD AUTO: 38.7 % (ref 35–47)
HGB BLD-MCNC: 12.4 G/DL (ref 11.7–15.7)
IMM GRANULOCYTES # BLD: 0 10E3/UL
IMM GRANULOCYTES NFR BLD: 0 %
LYMPHOCYTES # BLD AUTO: 1 10E3/UL (ref 0.8–5.3)
LYMPHOCYTES NFR BLD AUTO: 23 %
MCH RBC QN AUTO: 25.9 PG (ref 26.5–33)
MCHC RBC AUTO-ENTMCNC: 32 G/DL (ref 31.5–36.5)
MCV RBC AUTO: 81 FL (ref 78–100)
MONOCYTES # BLD AUTO: 0.5 10E3/UL (ref 0–1.3)
MONOCYTES NFR BLD AUTO: 12 %
NEUTROPHILS # BLD AUTO: 2.7 10E3/UL (ref 1.6–8.3)
NEUTROPHILS NFR BLD AUTO: 61 %
NRBC # BLD AUTO: 0 10E3/UL
NRBC BLD AUTO-RTO: 0 /100
PLATELET # BLD AUTO: 228 10E3/UL (ref 150–450)
POTASSIUM SERPL-SCNC: 3.3 MMOL/L (ref 3.4–5.3)
PROT SERPL-MCNC: 6.7 G/DL (ref 6.4–8.3)
RBC # BLD AUTO: 4.78 10E6/UL (ref 3.8–5.2)
SODIUM SERPL-SCNC: 141 MMOL/L (ref 135–145)
WBC # BLD AUTO: 4.3 10E3/UL (ref 4–11)

## 2023-11-07 PROCEDURE — 36415 COLL VENOUS BLD VENIPUNCTURE: CPT | Performed by: INTERNAL MEDICINE

## 2023-11-07 PROCEDURE — 80053 COMPREHEN METABOLIC PANEL: CPT | Performed by: INTERNAL MEDICINE

## 2023-11-07 PROCEDURE — G0463 HOSPITAL OUTPT CLINIC VISIT: HCPCS | Performed by: INTERNAL MEDICINE

## 2023-11-07 PROCEDURE — G0279 TOMOSYNTHESIS, MAMMO: HCPCS | Performed by: RADIOLOGY

## 2023-11-07 PROCEDURE — 99214 OFFICE O/P EST MOD 30 MIN: CPT | Performed by: INTERNAL MEDICINE

## 2023-11-07 PROCEDURE — 85004 AUTOMATED DIFF WBC COUNT: CPT | Performed by: INTERNAL MEDICINE

## 2023-11-07 PROCEDURE — 77066 DX MAMMO INCL CAD BI: CPT | Performed by: RADIOLOGY

## 2023-11-07 ASSESSMENT — PAIN SCALES - GENERAL: PAINLEVEL: NO PAIN (0)

## 2023-11-07 NOTE — NURSING NOTE
Chief Complaint   Patient presents with    Blood Draw     Labs drawn with  by RN. Vitals taken. Pt checked into next appointment.       Linette Machado RN

## 2023-11-07 NOTE — NURSING NOTE
"Oncology Rooming Note    November 7, 2023 10:39 AM   Philippe CHAU Medellin is a 53 year old female who presents for:    Chief Complaint   Patient presents with    Blood Draw     Labs drawn with  by RN. Vitals taken. Pt checked into next appointment.      Oncology Clinic Visit     Breast Ca     Initial Vitals: /75 (BP Location: Right arm, Patient Position: Sitting, Cuff Size: Adult Regular)   Pulse 95   Temp 97.8  F (36.6  C) (Oral)   Resp 16   Wt 74.1 kg (163 lb 4.8 oz)   SpO2 98%   BMI 27.34 kg/m   Estimated body mass index is 27.34 kg/m  as calculated from the following:    Height as of 6/7/23: 1.646 m (5' 4.8\").    Weight as of this encounter: 74.1 kg (163 lb 4.8 oz). Body surface area is 1.84 meters squared.  No Pain (0) Comment: Data Unavailable   No LMP recorded.  Allergies reviewed: Yes  Medications reviewed: Yes    Medications: Medication refills not needed today.  Pharmacy name entered into EPIC:    [facility] [place] [place]  [id number] - [place], [place] - [address]    Clinical concerns:        Breanna Abel CMA              "

## 2023-11-07 NOTE — LETTER
11/7/2023         RE: Philippe Medellin  [address]  [facility] [address]        Dear Colleague,    Thank you for referring your patient, Philippe Medellin, to the [facility]. Please see a copy of my visit note below.    MEDICAL ONCOLOGY FOLLOW UP NOTE     Philippe Tom  Female, 52 year old, 1970  MRN:   [id number]           Dear Dr. Diggs,     Thank you for referring Philippe Tom regarding a newly diagnosed DCIS with a microinvasive component.     PROBLEM: New diagnosis of left sided DCIS with invasive component     HISTORY OF PRESENT ILLNESS: Ms. Tom is a 52 year old woman from [place], [place] with a new diagnosis of left breast Nuclear Grade 3 ER+(15-20%) DCIS with less than 1 mm of invasive disease as part of workup following routine screening mammogram that detected calcifications. She has not undergone surgical management as of yet, and is establishing care at the [facility] with Medical Oncology, and will be seeing Dr. Noman Diggs for surgical consultation on 1/27/2023.      Her oncologic diagnosis came to light after routine screening mammogram on 12/27/2022 demonstrated indeterminate grouped versus clustered fine microcalcifcations at the 2 o'clock position of the left breast at mid depth. Of note, she does have a history of previous breast biopsies in the right breast on 12/10/2015 that detected fibroadenomas at the 8 o'clock position, 5 cm from the nipple measuring 11 x 6 x 10 mm, and at the 10 o'clock position, 7 cm from the nipple measuring 8 x 4 x 7 mm. Within the 8 o'clock position, rare calcifications were noted adjacent to the fibroadenoma. She also had a diagnostic mammogram for a palpable lump in the left breast on 5/21/2019 at the 2 o'clock position. Focused left breast ultrasound demonstrated an anechoic cyst at the 2 o'clock psition, 8 cm from the nipple measuring 1.7 x 1.2 x 1.5 cm, noted to be benign. An  additional anechoic cyst was noted at 1:30 position, 7 cm from the nipple measurign up to 8 mm and routine yearly mammography was recommended.     She underwent stereotactic guided biopsy at [facility] on 1/10/2023 with pathology (Specimen -[id number]) demonstrating Nuclear Grade 3 ER+ (11-20%, weak) DCIS with central comedonecrosis, with less than 1 mm of invasive disease noted within the specimen.      She was seen by Dr. Kelly Garcia for surgical consultation on 1/19/2023 who discussed surgical options with the patient. Note unavailable in care everywhere    She saw Medical Genetics at [facility] on 1/25/2023, seeing Sasha Chacko. [facility]'s STAT breast panel was sent.      SUMMARY OF OUTSIDE STUDIES:  Patient had bilateral screening mammograms on 12/27/22 and indeterminate grouped versus clustered fine microcalcifications at the 2:00 position of the left breast, middle depth. Recommend correlation with a mediolateral view and spot magnification CC and MLO views of the left breast for further evaluation. Diagnostic imaging followed on 12/30 and clustered microcalcifications in the upper outer aspect of the left breast are suspicious of malignancy. Recommend stereotactically guided biopsy.      1/10/23 - Left breast, stereotactic needle core biopsy at 2:00:  Biopsy at [facility] - pathology report needed.   Left breast, needle core biopsy at 2:00: Ductal carcinoma in situ with   calcifications and at least microinvasion.    Patient states this was left invasive ductal carcinoma,  ER+    <1mm of invasive -- micro  1/19/23 - She did meet with Karley Garcia - consult note needed  Genetic counseling consult scheduled for 1/25 at [facility] - consult note needed.        Breast Cancer Risk Factors:   Breast biopsies: 2 breast biopsies on the right breast that demonstrated fibroadenomas  OCPs: duration of 10 years in college  Menarche: around 7th grade,  estimated at 15 years old  Gestational history: 3 pregnancies, 2 live births at 30 years and 32 years, 1  at 20 years  Currently using Mirena IUD for significant bleeding from fibroids. Estimated LMP is 2023  No first degree relatives with breast or ovarian cancer.  She does have a family history of a maternal great aunt and maternal first cousin with breast cancer.     Menstrual history: Menarche age 15.  First pregnancy age 20.  Ended in .  Pregnancies age 30 and 32 with live births.  She has fibroids has a Mirena IUD with light periods.  She did have a history of heavy menstrual bleeding related to the fibroids, before the Mirena IUD was placed.  No history of hormone replacement therapy.     PMH:   Attention deficit disorder  Allergies  Migraines  Fibroids  The patient does not have a history of low bone density, diabetes mellitus, hyperlipidemia, or hypertension     She has no history of breast surgery, breast cancer in the past, radiation therapy in the past, radiation exposure in the past, tumor of any kind, heart problems, heart attack, breathing problems, blood clots, seizures, arthritis, peptic ulcer disease, osteoporosis, bone fractures.  She is not currently participating in a clinical trial.     PSH:  Intrauterine device insertion     MEDICATIONS:  Cetirizine  Cyclobenzaprine  Fremanezumab-vfrm (Ajovy)  Methylphenidate  Sumatriptan  Tretinoin  Adderall  Rimegepant  Epinephrine 0.3 mg/0.3 mL     ALLERGY:  Bee pollen  Walnuts  Doxycycline  Sulfa  Wasps       Dog hair  - She follows with Dr. Rufus Cardenas at [facility]     FAMILY HISTORY:  Mother with skin cancer on the leg at 65 years  Father with prostate cancer at 62 years old, currently alive, no recurrence [id number]. Lilia 3+4.  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 The last noted menstrual cycle completed on January 22, 2023.      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 LEFT BREAST, SEED-LOCALIZED LUMPECTOMY:  - INVASIVE DUCTAL CARCINOMA, [place] grade 2, measuring 2.5 mm in greatest extent.  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  - Margins are negative for invasive carcinoma; closest uninvolved anterior margin is 6 mm.  - Margins are negative for DCIS; closest uninvolved inferior and medial margins are 1.5 mm.  - AJCC pathologic staging is pT1a pN0(sn).  - Biopsy marker and biopsy site changes present.  - Fibrocystic change and columnar cell change with associated calcifications.  - Invasive carcinoma is ER (60%, weak) and WY (30%, moderate) positive, HER2 IHC is pending and the result will be reported in an addendum.     B. LEFT BREAST, NEW ANTERIOR MARGIN, EXCISION:  - Benign breast tissue with fibrocystic change, sclerosis adenosis and associated calcifications.  - Negative for atypia or malignancy.     C. LEFT BREAST, NEW LATERAL MARGIN, EXCISION:  - Benign breast tissue with fibrocystic change.  -  Negative for atypia or malignancy.     D. LEFT BREAST, NEW MEDIAL MARGIN, EXCISION:  - Benign breast tissue with fibrocystic change, columnar cell change and associated calcifications.  - Negative for atypia or malignancy.     E. SENTINEL LYMPH NODE, LEFT AXILLA, BIOPSY #1:  - One lymph node, negative for malignancy (0/1).     F. SENTINEL LYMPH NODE, LEFT AXILLA, BIOPSY #2:  - One lymph node, negative for malignancy (0/1).     TREATMENT HISTORY:  A.  Diagnosis with [id number] invasive ductal cancer of the left breast.  - Invasive carcinoma is ER (60%, weak) and UT (30%, moderate) positive,  HER2 negative 1+  B.  Mirana IUD removed.  C.  Lumpectomy WOODY quadrant.   D.  Bilateral mammoplasty breast reduction.  Follow up with Dr. Arnold.  E.  Radiation  Treatment Summary:  Radiation Oncology - Course: 1         Protocol:   Treatment Site            Current Dose   Modality           From    To        Elapsed Days  Fx.  1 Left Breast     4,240 cGy       06 X                  4/03/2023        4/28/2023        25       16                                                Dose per Fraction: 265 cGy   Total Dose:      4240 cGy  F.  Hormonal therapy with tamoxifen planned.   G.  Stopped femanezumab which she was taking for migraines.  Migraines are better.   H.  Continuing on tamoxifen 5 mg.          COVID-19 vaccination  - She has had her last (4th) booster in November 2022.         INTERVAL HISTORY:    Philippe and her  come to clinic for recommendations regarding imaging follow-up as well as implications of NF 1 germline mosaicism.  She continues on low-dose tamoxifen 5 mg/day     Periods are now regular.  She reports that her periods are lighter.    No significant pain, fatigue, depression, anxiety.  She does have hot flashes.  She does have some minor joint stiffness.  The stiffness is in both knees.  Mammogram today showed benign findings.  She does have asthma.     REVIEW OF SYSTEMS: A 10 point review of systems was  negative.     PHYSICAL EXAM  VITAL SIGNS: /75 (BP Location: Right arm, Patient Position: Sitting, Cuff Size: Adult Regular)   Pulse 95   Temp 97.8  F (36.6  C) (Oral)   Resp 16   Wt 74.1 kg (163 lb 4.8 oz)   SpO2 98%   BMI 27.34 kg/m    GENERAL:  Philippe appeared generally well. She has no alopecia.  HEENT:  Examination of the oropharynx is without lesions.  LYMPH:  There is no palpable cervical, supraclavicular, subclavicular or axillary lymphadenopathy.  BREASTS: No masses in the right breast.  There is a right breast mammoplasty incision is well-healed without erythema or masses.  Its circumareolar and at the 6 o'clock position.  Examination left breast reveals a mild mammoplasty incision that is well-healed without erythema or masses.  The incision is circumareolar and at the 6 o'clock position.  There is a left lumpectomy incision 4 fingerbreadths from nipple areolar complex at the 230 o'clock position near the anterior axillary line.  This incision is well-healed without erythema or masses.  Left axillary incisions well-healed without erythema or masses.  Nipples are everted.  LUNGS:  Clear to percussion and auscultation.  HEART:  There is a regular rate and rhythm.  S1, S2.  ABDOMEN:  Soft, nontender, without hepatosplenomegaly.  EXTREMITIES:  Without edema.  PSYCH:  Mood and affect were normal.     LABS:  CMP normal except for K 3.3.  CBC shows normalization of Hb and MCV but both borderline low.         ASSESSMENT AND PLAN:   1Michelle Tom is a 53 year old pre-menopausal woman with a recent diagnosis of a left breast  nuclear grade 3 DCIS with a microinvasive component < 1 mm diagnosed from a stereotactic biopsy after routine screening mammogram revealed calcifications. ECOG 0.   2.  She underwent left upper outer quadrant lumpectomy and then had bilateral breast reduction. Philippe is seen today approximately 4 months after the completion of radiation.  There are pigmentation changes and skin  thickening of the left breast. Radiation to left breast.  Completed April 28.    3.  Adjuvant hormonal therapy. She has tolerated tamoxifen 5 mg/day with some hot flashes and disruption of sleep but otherwise has been tolerating it reasonably well.  I am not sure what to make of the itching without rashes and we will continue to monitor.  She does take Zyrtec with some improvement of symptoms.  Whether the itchiness is directly related to tamoxifen or not is unclear certain.  The plan would be to continue the tamoxifen at 5 mg/day for a total of 5 years as opposed to 3 years (Haylee paper) and that is since he paper because of the T1 a invasive component of her tumor.    4.  Discussion of the risks and potential benefits of tamoxifen.  We again discussed the risks of endometrial cancer and thrombosis which is about 0.5-1 per thousand women per year for each. She knows to report to us if she has any vaginal bleeding between periods on tamoxifen or vaginal bleeding with periods.  She has had no bleeding between periods.  5.  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  We will refer her to Dr. Laura Osei for recommendations.  I do think that intensive surveillance would be reasonable consisting of mammography and MRI each performed yearly and spaced 6 months apart. She understands risks of false positive findings and does want this approach.  I would start with a mammogram 6 months after the completion of radiation which would be in November 2023.  She does have 2 children aged 20 and 22 and I discussed that it may be reasonable to screen them for NF1.  She has a genetics consultation scheduled in about 1 month.  7.  Intensive surveillance because of NF1 mutation.  Mammogram showed benign findings today.  MRI in 6 months.   6.  Fibroids.  Notably despite having fibroids her vaginal bleeding is not increased with tamoxifen is rather decreased.  Also it is reassuring that she does not have bleeding  between periods.  Colonoscopy was performed at [facility].  I asked her to forward the colonoscopy report to us.  6. Iron deficiency anemia.  May be related to fibroids. Iron could be prescribed for 3 months.  Still taking iron.  MCV 81 and Hb is back up to 12 but we will continue Fe for another 3 months.  Periods have become normalized recently and have not been heavy.  There was an interval of 2 weeks between.  Just recently but now the periods are monthly and regular.  7.  Consideration of hysterectomy and oophorectomy.  She is not interested in hysterectomy or oophorectomy at the present time.  6.  Recommendations of exercise of 150 minutes/week based on the lace and pathway study.  7. Recommendation of a Mediterranean style diet low in saturated fat but not low in fat with more fruits and vegetables and less red meat.   8.  Recommendation for bone health includes vitamin D3 2000 international units daily and calcium 1 g/day either by diet or reading labels.  We also recommend bone strengthening exercises including stretch band hand weights and yoga.  9.  Migraines, associated with menses.    10.  Colonoscopy at [facility] in April. One polyp  5 years follow up.   10.  Follow up.  Follow-up with Gillian  Feb. 1 with check of CBC and CMP.  May 6 with breast MRI CBC, CMP.  Follow up with me May 7.  Follow-up with Gillian August 8 with CBC, CMP.  Follow up with me  in November 12 with mammogram CBC and CMP.     Thank you for allowing us to participate in this patient's care.     Sincerely,      Marc Judd MD  Professor  [facility]  [phone]              I spent 40 minutes with the patient more than 50% of which was in counseling and coordination of care.

## 2023-11-16 NOTE — PROGRESS NOTES
Department of Therapeutic Radiology--Radiation Oncology                   [address]  [address] [place]  [address]  Office:  [phone]  Fax:  [phone]   Radiation Oncology Clinic  [address] [place] [address]  Phone:  [phone]  Fax:  [phone]     RE: Philippe Medellin : 1970   MRN: [id number] CLARIBEL: 2023     OUTPATIENT VISIT NOTE       DIAGNOSIS: Invasive ductal carcinoma of the left breast, s/p lumpectomy and SLN biopsy, followed by mammoreduction, pT1aN0(sn), ER+, ND+, HER2-, grade 2    AREAS TREATED: left breast    DOSE:  4240 cGy in 16 fractions     TYPES OF RADIATION GIVEN: 3D conformal radiotherapy with DIBH          INTERVAL SINCE COMPLETION OF RADIATION THERAPY: ~ 6.5 month (she completed the treatment on 2023)    SUBJECTIVE: Ms. Medellin is a 52 yo female with an invasive carcinoma of the left breast. This was detected on routine screening mammogram as microcalcification. Stereotactic biopsy was consistent with DCIS with at least microinvasion. DCIS was nuclear grade 3 with comedonecrosis and ER weakly positive (11-20%).      On 2/15/2023, Philippe underwent seed-localized lumpectomy and SLN biopsy by Dr. Diggs. Pathology showed invasive ductal carcinoma, Grade 2, measuring 2.5 mm in greatest extent. Associated DCIS was intermediate grade, cribriform and solid types with focal necrosis, measuring up to 2.0 cm.Shaved margins were taken with the final closest invasive margin being 6 mm anterior, DCIS 1.5 mm inferior and medial. Two SLN were both negative. Final stage pT1aN0(sn).  Invasive carcinoma was ER 60% weakly positive, ND 30% moderately positive, HER2 negative by IHC (1+).     Philippe desired breast reduction and proceeded with bilateral reduction mammoplasty by Dr. Arnold on 2023. Pathology was negative for atypia or malignancy.      Post-op, Dr. Judd recommended Goserelin with Exemestane. An Oncotype Dx score was not  sent due to the small size of the invasive component.       Cresencio then proceeded with adjuvant radiation therapy. Her lumpectomy cavity could not be clearly discerned, and therefore a decision was made to forgo a lumpectomy cavity boost.      During the treatment, she developed cellulitis of the left breast, requiring 2 courses of antibiotics.     Since completing the radiation therapy, she was started on Tamoxifen. She was initially scheduled for a hysterectomy with Dr. Lion, but canceled it as her heavy menses improved significantly.     Philippe last saw Dr. Judd on 11/7/2023. She was recommended to continue with Tamoxifen at 5 mg per day for a total of 5 years. 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 Laura Sea in genetics next month. Her mammogram from 11/7/2023 showed benign findings.     Philippe is here today with her  for a routine follow up visit. She is doing well overall. She is tolerating the Tamoxifen with the challenge being difficult to cut the pill in half for the 5 mg dosage. She feels warm sometimes, but has no overt hot flashes. Very minimal mood swings. With regard to her breasts, she notes tenderness in her upper chest. It is also difficult for her to wear a bra with underwire. She has seen Ms. See Yg a few months ago and is doing lymphatic drainage at home, primarily with pressing down. She was also provided a sleeve which she wears with strenuous exercise or when she flies. She denies any range of motion deficit.      OBJECTIVE:   /75 (BP Location: Right arm)   Pulse 76   Resp 16   SpO2 98%    Gen: Appears well, NAD.   Breasts: Fair symmetry. The left breast is slightly larger. No residual hyperpigmentation. Enlarged pores are seen in the left breast in the inferior and lateral aspect, indicative of mild edema.     ASSESSMENT AND PLAN: In summary, Ms. Ngero Medellin is a 54 yo female with a pT1aN0(sn)  invasive ductal carcinoma of the left breast, s/p lumpectomy and SLN biopsy, s/p mammoreduction and 6.5 months status post adjuvant radiation therapy.      She is likely to have some ongoing lymphedema of the left breast given her lumpectomy, mammoreduction surgery and radiation therapy. She is to wear a supportive bra for compression. She will also continue lymphatic drainage maneuvers  at home. If her tenderness or edema worsens, we will refer her back to lymphedema therapy.     Philippe can otherwise see us on as needed basis. She will continue endocrine therapy and surveillance under the direction of Dr. Judd.     It was a pleasure being involved in her care.          Jaiden Rodrigues M.D./Ph.D.  Radiation Oncologist   Department of Therapeutic Radiology   [facility] [place]  Phone: [phone]           30 minutes were spent on the date of the encounter doing chart review, history and exam, documentation and further activities as noted above.           Jaiden Rodrigues MD

## 2023-11-17 ENCOUNTER — OFFICE VISIT (OUTPATIENT)
Dept: RADIATION ONCOLOGY | Facility: CLINIC | Age: 53
End: 2023-11-17
Attending: RADIOLOGY
Payer: MEDICAID

## 2023-11-17 VITALS
RESPIRATION RATE: 16 BRPM | HEART RATE: 76 BPM | DIASTOLIC BLOOD PRESSURE: 75 MMHG | SYSTOLIC BLOOD PRESSURE: 120 MMHG | OXYGEN SATURATION: 98 %

## 2023-11-17 DIAGNOSIS — Z17.0 MALIGNANT NEOPLASM OF UPPER-OUTER QUADRANT OF LEFT BREAST IN FEMALE, ESTROGEN RECEPTOR POSITIVE (H): Primary | ICD-10-CM

## 2023-11-17 DIAGNOSIS — C50.412 MALIGNANT NEOPLASM OF UPPER-OUTER QUADRANT OF LEFT BREAST IN FEMALE, ESTROGEN RECEPTOR POSITIVE (H): Primary | ICD-10-CM

## 2023-11-17 PROCEDURE — G0463 HOSPITAL OUTPT CLINIC VISIT: HCPCS | Performed by: RADIOLOGY

## 2023-11-17 PROCEDURE — 99214 OFFICE O/P EST MOD 30 MIN: CPT | Performed by: RADIOLOGY

## 2023-11-17 ASSESSMENT — PAIN SCALES - GENERAL: PAINLEVEL: MILD PAIN (2)

## 2023-11-17 NOTE — LETTER
2023         RE: Philippe Medellin  [address] [facility] [address]        Dear Colleague,    Thank you for referring your patient, Philippe Medellin, to the [facility] RADIATION ONCOLOGY. Please see a copy of my visit note below.    Department of Therapeutic Radiology--Radiation Oncology                   [place] [address]  [address] [place]  [address]  Office:  [phone]  Fax:  [phone]   Radiation Oncology Clinic  [address] [place] [address]  Phone:  [phone]  Fax:  [phone]     RE: Philippe Medellin : 1970   MRN: [id number] CLARIBEL: 2023     OUTPATIENT VISIT NOTE       DIAGNOSIS: Invasive ductal carcinoma of the left breast, s/p lumpectomy and SLN biopsy, followed by mammoreduction, pT1aN0(sn), ER+, DE+, HER2-, grade 2    AREAS TREATED: left breast    DOSE:  4240 cGy in 16 fractions     TYPES OF RADIATION GIVEN: 3D conformal radiotherapy with DIBH          INTERVAL SINCE COMPLETION OF RADIATION THERAPY: ~ 6.5 month (she completed the treatment on 2023)    SUBJECTIVE: Ms. Medellin is a 52 yo female with an invasive carcinoma of the left breast. This was detected on routine screening mammogram as microcalcification. Stereotactic biopsy was consistent with DCIS with at least microinvasion. DCIS was nuclear grade 3 with comedonecrosis and ER weakly positive (11-20%).      On 2/15/2023, Philippe underwent seed-localized lumpectomy and SLN biopsy by Dr. Diggs. Pathology showed invasive ductal carcinoma, Grade 2, measuring 2.5 mm in greatest extent. Associated DCIS was intermediate grade, cribriform and solid types with focal necrosis, measuring up to 2.0 cm.Shaved margins were taken with the final closest invasive margin being 6 mm anterior, DCIS 1.5 mm inferior and medial. Two SLN were both negative. Final stage pT1aN0(sn).  Invasive carcinoma was ER 60% weakly positive, DE 30% moderately positive, HER2  negative by IHC (1+).     Philippe desired breast reduction and proceeded with bilateral reduction mammoplasty by Dr. Arnold on 2/24/2023. Pathology was negative for atypia or malignancy.      Post-op, Dr. Judd recommended Goserelin with Exemestane. An Oncotype Dx score was not sent due to the small size of the invasive component.       Cresencio then proceeded with adjuvant radiation therapy. Her lumpectomy cavity could not be clearly discerned, and therefore a decision was made to forgo a lumpectomy cavity boost.      During the treatment, she developed cellulitis of the left breast, requiring 2 courses of antibiotics.     Since completing the radiation therapy, she was started on Tamoxifen. She was initially scheduled for a hysterectomy with Dr. Lion, but canceled it as her heavy menses improved significantly.     Philippe last saw Dr. Judd on 11/7/2023. She was recommended to continue with Tamoxifen at 5 mg per day for a total of 5 years. 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 Laura Osei in genetics next month. Her mammogram from 11/7/2023 showed benign findings.     Philippe is here today with her  for a routine follow up visit. She is doing well overall. She is tolerating the Tamoxifen with the challenge being difficult to cut the pill in half for the 5 mg dosage. She feels warm sometimes, but has no overt hot flashes. Very minimal mood swings. With regard to her breasts, she notes tenderness in her upper chest. It is also difficult for her to wear a bra with underwire. She has seen Ms. Mayi Ronquillo a few months ago and is doing lymphatic drainage at home, primarily with pressing down. She was also provided a sleeve which she wears with strenuous exercise or when she flies. She denies any range of motion deficit.      OBJECTIVE:   /75 (BP Location: Right arm)   Pulse 76   Resp 16   SpO2 98%    Gen: Appears well, NAD.   Breasts:  Fair symmetry. The left breast is slightly larger. No residual hyperpigmentation. Enlarged pores are seen in the left breast in the inferior and lateral aspect, indicative of mild edema.     ASSESSMENT AND PLAN: In summary, Ms. Negro Medellin is a 54 yo female with a pT1aN0(sn) invasive ductal carcinoma of the left breast, s/p lumpectomy and SLN biopsy, s/p mammoreduction and 6.5 months status post adjuvant radiation therapy.      She is likely to have some ongoing lymphedema of the left breast given her lumpectomy, mammoreduction surgery and radiation therapy. She is to wear a supportive bra for compression. She will also continue lymphatic drainage maneuvers  at home. If her tenderness or edema worsens, we will refer her back to lymphedema therapy.     Philippe can otherwise see us on as needed basis. She will continue endocrine therapy and surveillance under the direction of Dr. Judd.     It was a pleasure being involved in her care.          Jaiden Rodrigues M.D./Ph.D.  Radiation Oncologist   Department of Therapeutic Radiology   [facility]  Phone: [phone]           30 minutes were spent on the date of the encounter doing chart review, history and exam, documentation and further activities as noted above.           Jaiden Rodrigues MD    FOLLOW-UP VISIT    Patient Name: Philippe Medellin      : 1970     Age: 53 year old        ______________________________________________________________________________     Chief Complaint   Patient presents with    Cancer     Six month follow-up     /75 (BP Location: Right arm)   Pulse 76   Resp 16   SpO2 98%      Date Radiation Completed: Breast ca: Radiation Lt breast 4240 cGy in 16 fractions finished 23     Pain  Current history of pain associated with this visit:   Intensity: 2/10  Current: tenderness  Location: top of L breast  Treatment: none    Labs  Other Labs: Yes: 2023    Imaging  Mammogram: 2023,  5/06/2024, 11/12/2024      Other Appointments:     MD Name: Trell Nuñez Appointment Date: 1/31/2024   MD Name: Gillian Tenorio Appointment Date: 2/01/2024   MD Name: Marc Judd Appointment Date: 5/07/2024   Other Appointment Notes:     Residual Radiation side effect: No concerns.  Pt is using a compression sleeve when exercising and flying.      Additional Instructions: None.     Nurse face-to-face time: Level 3:  10 min face to face time            Again, thank you for allowing me to participate in the care of your patient.        Sincerely,        Jaiden Rodrigues MD

## 2023-11-17 NOTE — PROGRESS NOTES
FOLLOW-UP VISIT    Patient Name: Philippe Medellin      : 1970     Age: 53 year old        ______________________________________________________________________________     Chief Complaint   Patient presents with    Cancer     Six month follow-up     /75 (BP Location: Right arm)   Pulse 76   Resp 16   SpO2 98%      Date Radiation Completed: Breast ca: Radiation Lt breast 4240 cGy in 16 fractions finished 23     Pain  Current history of pain associated with this visit:   Intensity: 2/10  Current: tenderness  Location: top of L breast  Treatment: none    Labs  Other Labs: Yes: 2023    Imaging  Mammogram: 2023, 2024, 2024      Other Appointments:     MD Name: Trell Nuñez Appointment Date: 2024   MD Name: Gillian Tenorio Appointment Date: 2024   MD Name: Marc Judd Appointment Date: 2024   Other Appointment Notes:     Residual Radiation side effect: No concerns.  Pt is using a compression sleeve when exercising and flying.      Additional Instructions: None.     Nurse face-to-face time: Level 3:  10 min face to face time

## 2024-01-20 ENCOUNTER — HEALTH MAINTENANCE LETTER (OUTPATIENT)
Age: 54
End: 2024-01-20

## 2024-02-13 DIAGNOSIS — Z17.0 MALIGNANT NEOPLASM OF UPPER-OUTER QUADRANT OF LEFT BREAST IN FEMALE, ESTROGEN RECEPTOR POSITIVE (H): ICD-10-CM

## 2024-02-13 DIAGNOSIS — C50.412 MALIGNANT NEOPLASM OF UPPER-OUTER QUADRANT OF LEFT BREAST IN FEMALE, ESTROGEN RECEPTOR POSITIVE (H): ICD-10-CM

## 2024-02-13 NOTE — TELEPHONE ENCOUNTER
"Tamoxifen 10mg tab  Last prescribing provider: Dr. Marc Judd     Last clinic visit date: 11/7/23    Recommendations for requested medication (if none, N/A): 11/7/23, \" The plan would be to continue the tamoxifen at 5 mg/day for a total of 5 years as opposed to 3 years (Darrelli paper) and that is since he paper because of the T1 a invasive component of her tumor.\"    Any other pertinent information (if none, N/A): NA    Refilled: Y/N, if NO, why?    "

## 2024-02-14 ENCOUNTER — ONCOLOGY VISIT (OUTPATIENT)
Dept: PEDIATRIC HEMATOLOGY/ONCOLOGY | Facility: CLINIC | Age: 54
End: 2024-02-14
Attending: INTERNAL MEDICINE
Payer: MEDICAID

## 2024-02-14 VITALS
SYSTOLIC BLOOD PRESSURE: 129 MMHG | TEMPERATURE: 97.7 F | BODY MASS INDEX: 27.77 KG/M2 | HEART RATE: 85 BPM | HEIGHT: 65 IN | OXYGEN SATURATION: 100 % | WEIGHT: 166.67 LBS | DIASTOLIC BLOOD PRESSURE: 81 MMHG | RESPIRATION RATE: 16 BRPM

## 2024-02-14 DIAGNOSIS — C50.012 MALIGNANT NEOPLASM OF NIPPLE OF LEFT BREAST IN FEMALE, ESTROGEN RECEPTOR POSITIVE (H): ICD-10-CM

## 2024-02-14 DIAGNOSIS — Z17.0 MALIGNANT NEOPLASM OF NIPPLE OF LEFT BREAST IN FEMALE, ESTROGEN RECEPTOR POSITIVE (H): ICD-10-CM

## 2024-02-14 PROCEDURE — G0463 HOSPITAL OUTPT CLINIC VISIT: HCPCS | Performed by: PEDIATRICS

## 2024-02-14 PROCEDURE — 99204 OFFICE O/P NEW MOD 45 MIN: CPT | Performed by: PEDIATRICS

## 2024-02-14 RX ORDER — TAMOXIFEN CITRATE 10 MG/1
5 TABLET ORAL DAILY
Qty: 45 TABLET | Refills: 0 | Status: SHIPPED | OUTPATIENT
Start: 2024-02-14 | End: 2024-04-15

## 2024-02-14 ASSESSMENT — ENCOUNTER SYMPTOMS
BACK PAIN: 0
DIARRHEA: 0
DOUBLE VISION: 0
EYE PAIN: 0
COUGH: 0
CONSTIPATION: 1
MYALGIAS: 0
NECK PAIN: 0
PALPITATIONS: 0
BLURRED VISION: 0
CONSTITUTIONAL NEGATIVE: 1
PSYCHIATRIC NEGATIVE: 1
NAUSEA: 1
HEADACHES: 1
HEARTBURN: 1
HEMATURIA: 0
FREQUENCY: 1
DYSURIA: 0
SHORTNESS OF BREATH: 1
BLOOD IN STOOL: 0
ABDOMINAL PAIN: 0
VOMITING: 1

## 2024-02-14 ASSESSMENT — PAIN SCALES - GENERAL: PAINLEVEL: NO PAIN (0)

## 2024-02-14 NOTE — LETTER
2/14/2024      RE: Philippe Medellin  [address]  [facility] [address]     Dear Colleague,    Thank you for the opportunity to participate in the care of your patient, Philippe Medellin, at the [facility]ED[facility] at [facility]. Please see a copy of my visit note below.    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  Philippe Medellin is a 53 year old with a history of  malignant neoplasm of upper-outer quadrant of left breast Nuclear Grade 3 ER+(15-20%) DCIS with less than 1 mm of invasive disease  who presents to the clinic following a visit with Dr. Marc Judd on 11/07/2023. She comes to the clinic with her , Esau.     She has a master's degree in counseling psychology. 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 She began taking Concerta in 1997 and still continues to take it now.     Her current medications include Zyrtec at night for allergies, Cyclobenzaprine and AJOVY injections for migraines, and Tamoxifen for breast cancer.     She has many freckles across her arms and legs but hasn't noticed any stigmata in her axilla and/or groin area.     She had occasional headaches in her childhood. Within the last three or four years they have started to occur more often. She notes that they are often pressure headaches, not localized. Around [place] last year, she needed a steroid shot for a migraine that lasted 6 days.     Radiation: 4,240 cGy     FHx:   One of her sisters had a bowel obstruction and required emergency surgery twice. She also has several polyps.  Both her maternal grandmother and maternal uncle had/have a benign brain tumor. Her uncle is in his late 60s.   Her paternal grandmother and Philippe's  sister have found blood clots. Her paternal grandmother had a stroke due to the blood clots. She is the only family member to have a stroke.  Her children are diagnosed with central auditory processing disorder.  Philippe's son has scoliosis (25 degree curve). Philippe's mother and maternal grandmother also have/had scoliosis as well.     Fam/soc: Her daughter tested negative for NF. Her son has not yet been tested.      Current Outpatient Medications   Medication    acetaminophen (TYLENOL) 325 MG tablet    ammonium lactate (AMLACTIN) 12 % external cream    cetirizine (ZYRTEC) 10 MG tablet    cyclobenzaprine (FLEXERIL) 10 MG tablet    EPINEPHrine (ANY BX GENERIC EQUIV) 0.3 MG/0.3ML injection 2-pack    ferrous sulfate (SLO-FE) 142 (45 Fe) MG CR tablet    fluconazole (DIFLUCAN) 150 MG tablet    ketotifen (ZADITOR) 0.025 % ophthalmic solution    magnesium oxide 200 MG TABS    methylphenidate (CONCERTA) 36 MG CR tablet    methylphenidate (RITALIN) 20 MG tablet    MULTIVITAMINS OR    PROAIR  (90 BASE) MCG/ACT IN AERS    Rimegepant Sulfate (NURTEC PO)    SODIUM FLUORIDE 5000 PPM 1.1 % PSTE dental paste    SUMAtriptan (IMITREX) 100 MG tablet    SUMAtriptan (IMITREX) 20 MG/ACT nasal spray    tamoxifen (NOLVADEX) 10 MG tablet    tretinoin (RETIN-A) 0.025 % external cream    UNABLE TO FIND    Vitamin D (Cholecalciferol) 50 MCG (2000 UT) CAPS    ZOLMitriptan (ZOMIG-ZMT) 5 MG ODT     No current facility-administered medications for this visit.      Review of Systems   Constitutional: Negative.    HENT:  Negative for ear pain, hearing loss and tinnitus.         Sensitive ears, concerta helps her focus on one auditory source at a time.   Eyes:  Negative for blurred vision, double vision and pain.        Random onset visual hallucinations. Glasses present.   Respiratory:  Positive for shortness of breath (intermittent, usually occurs prior to her menstrual cycle). Negative for cough.    Cardiovascular:  Negative for chest pain  "and palpitations.   Gastrointestinal:  Positive for constipation (mostly resolved), heartburn (occasional, accompanied by nausea & vomiting), nausea (accompanies heartburn) and vomiting (accompanies heartburn). Negative for abdominal pain, blood in stool and diarrhea.   Genitourinary:  Positive for frequency (rare occurence). Negative for dysuria, hematuria and urgency.   Musculoskeletal:  Negative for back pain, joint pain, myalgias and neck pain.   Skin:  Negative for itching and rash.   Neurological:  Positive for headaches.   Endo/Heme/Allergies: Negative.         Uterine fibroids caused heavy menstrual cycles, but her flow is much lighter now   Psychiatric/Behavioral: Negative.          ADHD present   All other systems reviewed and are negative.    /81 (BP Location: Right arm, Patient Position: Sitting, Cuff Size: Adult Regular)   Pulse 85   Temp 97.7  F (36.5  C) (Oral)   Resp 16   Ht 1.642 m (5' 4.65\")   Wt 75.6 kg (166 lb 10.7 oz)   SpO2 100%   BMI 28.04 kg/m       Physical Exam  Vitals reviewed. Exam conducted with a chaperone present.   Constitutional:       Appearance: Normal appearance.   HENT:      Head: Normocephalic.      Right Ear: External ear normal.      Left Ear: External ear normal.      Nose: Nose normal.      Mouth/Throat:      Mouth: Mucous membranes are moist.      Pharynx: Oropharynx is clear.   Eyes:      Extraocular Movements: Extraocular movements intact.      Conjunctiva/sclera: Conjunctivae normal.      Pupils: Pupils are equal, round, and reactive to light.   Cardiovascular:      Rate and Rhythm: Normal rate and regular rhythm.      Pulses: Normal pulses.      Heart sounds: Normal heart sounds.   Pulmonary:      [place]: Pulmonary effort is normal.      Breath sounds: Normal breath sounds.   Musculoskeletal:         General: Normal range of motion.      Cervical back: Normal range of motion.   Skin:     General: Skin is warm and dry.      Capillary Refill: Capillary refill " takes less than 2 seconds.      Comments: No cafe au lait macules, axillary or inguinal freckling   Neurological:      General: No focal deficit present.      Mental Status: She is alert and oriented to person, place, and time. Mental status is at baseline.   Psychiatric:         Mood and Affect: Mood normal.         Behavior: Behavior normal.         Thought Content: Thought content normal.         Judgment: Judgment normal.       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  Carcinoma of upper-outer quadrant of left breast, estrogen receptor positive. Somatic testing of tumor for NF1      Plan:  Brain MRI scan will be scheduled today ([facility] at [facility]) - patients with NF1 may develop cerebral vasculopathy.  Once her son is back in [place], we can get him  tested for NF.   If she feels any new lumps, bumps, or pain in her radiated area, she should immediately call a physician or the clinic. Patients with NF1 are at risk for MPNST s/p XRT.  A video visit can be scheduled to review her MRI scans.   If possible, do genetic testing on tumor tissue to confirm that the NF1 alteration is pathogenic.     F/U in 2 years or prn      Total time spent on the following services on the date of the encounter:  Preparing to see patient, chart review, review of outside records, Referring or communicating with other healthcare professionals, Interpretation of labs, imaging and other tests, Performing a medically appropriate examination , Documenting clinical information in the electronic or other health record  and Total time spent: 45 minutes    IKarrie, am working as a scribe for and in the presence of Dr. Nuñez on February 14, 2024. Dr. Nuñez performed the services described in this note and the note is both complete and accurate.     Trell Nuñez MD

## 2024-02-14 NOTE — NURSING NOTE
"Chief Complaint   Patient presents with    New Patient     /81 (BP Location: Right arm, Patient Position: Sitting, Cuff Size: Adult Regular)   Pulse 85   Temp 97.7  F (36.5  C) (Oral)   Resp 16   Ht 1.642 m (5' 4.65\")   Wt 75.6 kg (166 lb 10.7 oz)   SpO2 100%   BMI 28.04 kg/m      No Pain (0)  Data Unavailable    I have reviewed the patients medications and allergies    Height/weight double check needed? No    Peds Outpatient BP  1) Rested for 5 minutes, BP taken on bare arm, patient sitting (or supine for infants) w/ legs uncrossed?   Yes  2) Right arm used?  Right arm   Yes  3) Arm circumference of largest part of upper arm (in cm):   4) BP cuff sized used: Adult (25-32cm)   If used different size cuff then what was recommended why? N/A  5) First BP reading:machine   BP Readings from Last 1 Encounters:   02/14/24 129/81      Is reading >90%?No   (90% for <1 years is 90/50)  (90% for >18 years is 140/90)  *If a machine BP is at or above 90% take manual BP  6) Manual BP reading: N/A  7) Other comments: None          Brandy Castellanos CMA  February 14, 2024    "

## 2024-02-14 NOTE — PATIENT INSTRUCTIONS
Plan-  MRI of Brain ordered.   Orders sent to [facility] Radiology - call [phone] to schedule  We will contact you with results

## 2024-02-14 NOTE — PROGRESS NOTES
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  Philippecammie Medellin is a 53 year old with a history of  malignant neoplasm of upper-outer quadrant of left breast Nuclear Grade 3 ER+(15-20%) DCIS with less than 1 mm of invasive disease  who presents to the clinic following a visit with Dr. Marc Judd on 11/07/2023. She comes to the clinic with her , Esau.     She has a master's degree in counseling psychology. 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 She began taking Concerta in 1997 and still continues to take it now.     Her current medications include Zyrtec at night for allergies, Cyclobenzaprine and AJOVY injections for migraines, and Tamoxifen for breast cancer.     She has many freckles across her arms and legs but hasn't noticed any stigmata in her axilla and/or groin area.     She had occasional headaches in her childhood. Within the last three or four years they have started to occur more often. She notes that they are often pressure headaches, not localized. Around Christmas last year, she needed a steroid shot for a migraine that lasted 6 days.     Radiation: 4,240 cGy     FHx:   One of her sisters had a bowel obstruction and required emergency surgery twice. She also has several polyps.  Both her maternal grandmother and maternal uncle had/have a benign brain tumor. Her uncle is in his late 60s.   Her paternal grandmother and Philippe's sister have found blood clots. Her paternal grandmother had a stroke due to the blood clots. She is the only family member to have a stroke.  Her children are diagnosed with central auditory processing disorder.  Philippe's son has scoliosis (25 degree curve). Philippe's mother and maternal grandmother also have/had scoliosis as well.     Fam/soc: Her daughter tested negative for  NF. Her son has not yet been tested.      Current Outpatient Medications   Medication     acetaminophen (TYLENOL) 325 MG tablet     ammonium lactate (AMLACTIN) 12 % external cream     cetirizine (ZYRTEC) 10 MG tablet     cyclobenzaprine (FLEXERIL) 10 MG tablet     EPINEPHrine (ANY BX GENERIC EQUIV) 0.3 MG/0.3ML injection 2-pack     ferrous sulfate (SLO-FE) 142 (45 Fe) MG CR tablet     fluconazole (DIFLUCAN) 150 MG tablet     ketotifen (ZADITOR) 0.025 % ophthalmic solution     magnesium oxide 200 MG TABS     methylphenidate (CONCERTA) 36 MG CR tablet     methylphenidate (RITALIN) 20 MG tablet     MULTIVITAMINS OR     PROAIR  (90 BASE) MCG/ACT IN AERS     Rimegepant Sulfate (NURTEC PO)     SODIUM FLUORIDE 5000 PPM 1.1 % PSTE dental paste     SUMAtriptan (IMITREX) 100 MG tablet     SUMAtriptan (IMITREX) 20 MG/ACT nasal spray     tamoxifen (NOLVADEX) 10 MG tablet     tretinoin (RETIN-A) 0.025 % external cream     UNABLE TO FIND     Vitamin D (Cholecalciferol) 50 MCG (2000 UT) CAPS     ZOLMitriptan (ZOMIG-ZMT) 5 MG ODT     No current facility-administered medications for this visit.      Review of Systems   Constitutional: Negative.    HENT:  Negative for ear pain, hearing loss and tinnitus.         Sensitive ears, concerta helps her focus on one auditory source at a time.   Eyes:  Negative for blurred vision, double vision and pain.        Random onset visual hallucinations. Glasses present.   Respiratory:  Positive for shortness of breath (intermittent, usually occurs prior to her menstrual cycle). Negative for cough.    Cardiovascular:  Negative for chest pain and palpitations.   Gastrointestinal:  Positive for constipation (mostly resolved), heartburn (occasional, accompanied by nausea & vomiting), nausea (accompanies heartburn) and vomiting (accompanies heartburn). Negative for abdominal pain, blood in stool and diarrhea.   Genitourinary:  Positive for frequency (rare occurence). Negative for dysuria,  "hematuria and urgency.   Musculoskeletal:  Negative for back pain, joint pain, myalgias and neck pain.   Skin:  Negative for itching and rash.   Neurological:  Positive for headaches.   Endo/Heme/Allergies: Negative.         Uterine fibroids caused heavy menstrual cycles, but her flow is much lighter now   Psychiatric/Behavioral: Negative.          ADHD present   All other systems reviewed and are negative.    /81 (BP Location: Right arm, Patient Position: Sitting, Cuff Size: Adult Regular)   Pulse 85   Temp 97.7  F (36.5  C) (Oral)   Resp 16   Ht 1.642 m (5' 4.65\")   Wt 75.6 kg (166 lb 10.7 oz)   SpO2 100%   BMI 28.04 kg/m       Physical Exam  Vitals reviewed. Exam conducted with a chaperone present.   Constitutional:       Appearance: Normal appearance.   HENT:      Head: Normocephalic.      Right Ear: External ear normal.      Left Ear: External ear normal.      Nose: Nose normal.      Mouth/Throat:      Mouth: Mucous membranes are moist.      Pharynx: Oropharynx is clear.   Eyes:      Extraocular Movements: Extraocular movements intact.      Conjunctiva/sclera: Conjunctivae normal.      Pupils: Pupils are equal, round, and reactive to light.   Cardiovascular:      Rate and Rhythm: Normal rate and regular rhythm.      Pulses: Normal pulses.      Heart sounds: Normal heart sounds.   Pulmonary:      Effort: Pulmonary effort is normal.      Breath sounds: Normal breath sounds.   Musculoskeletal:         General: Normal range of motion.      Cervical back: Normal range of motion.   Skin:     General: Skin is warm and dry.      Capillary Refill: Capillary refill takes less than 2 seconds.      Comments: No cafe au lait macules, axillary or inguinal freckling   Neurological:      General: No focal deficit present.      Mental Status: She is alert and oriented to person, place, and time. Mental status is at baseline.   Psychiatric:         Mood and Affect: Mood normal.         Behavior: Behavior normal.    "      Thought Content: Thought content normal.         Judgment: Judgment normal.       Impression:  1. 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  2. Carcinoma of upper-outer quadrant of left breast, estrogen receptor positive. Somatic testing of tumor for NF1      Plan:  1. Brain MRI scan will be scheduled today (preferably at [facility]) - patients with NF1 may develop cerebral vasculopathy.  2. Once her son is back in [place], we can get him  tested for NF.   3. If she feels any new lumps, bumps, or pain in her radiated area, she should immediately call a physician or the clinic. Patients with NF1 are at risk for MPNST s/p XRT.  4. A video visit can be scheduled to review her MRI scans.   5. If possible, do genetic testing on tumor tissue to confirm that the NF1 alteration is pathogenic.     F/U in 2 years or prn      Total time spent on the following services on the date of the encounter:  Preparing to see patient, chart review, review of outside records, Referring or communicating with other healthcare professionals, Interpretation of labs, imaging and other tests, Performing a medically appropriate examination , Documenting clinical information in the electronic or other health record  and Total time spent: 45 minutes    Karrie CHIN, am working as a scribe for and in the presence of Dr. Nuñez on February 14, 2024. Dr. Nuñez performed the services described in this note and the note is both complete and accurate.     Trell Nuñez MD

## 2024-02-14 NOTE — PROGRESS NOTES
MEDICAL ONCOLOGY FOLLOW UP NOTE     PROBLEM: New diagnosis of left sided DCIS with invasive component     HISTORY OF PRESENT ILLNESS: Ms. Tom is a 52 year old woman from [place], [place] with a diagnosis of left breast Nuclear Grade 3 ER+(15-20%) DCIS with less than 1 mm of invasive disease as part of workup following routine screening mammogram that detected calcifications.      Per prior notes, she had a routine screening mammogram on 12/27/2022 which demonstrated indeterminate grouped versus clustered fine microcalcifcations at the 2 o'clock position of the left breast at mid depth. Of note, she does have a history of previous breast biopsies in the right breast on 12/10/2015 that detected fibroadenomas at the 8 o'clock position, 5 cm from the nipple measuring 11 x 6 x 10 mm, and at the 10 o'clock position, 7 cm from the nipple measuring 8 x 4 x 7 mm. Within the 8 o'clock position, rare calcifications were noted adjacent to the fibroadenoma. She also had a diagnostic mammogram for a palpable lump in the left breast on 5/21/2019 at the 2 o'clock position. Focused left breast ultrasound demonstrated an anechoic cyst at the 2 o'clock psition, 8 cm from the nipple measuring 1.7 x 1.2 x 1.5 cm, noted to be benign. An additional anechoic cyst was noted at 1:30 position, 7 cm from the nipple measurign up to 8 mm and routine yearly mammography was recommended.     She underwent stereotactic guided biopsy at [facility] on 1/10/2023 with pathology (Specimen -[id number]) demonstrating Nuclear Grade 3 ER+ (11-20%, weak) DCIS with central comedonecrosis, with less than 1 mm of invasive disease noted within the specimen.     On 2/15/2023 she underwent seed-localized lumpectomy and SLN biopsy by Dr. Diggs, with pathology showing IDC grade 2, 2.5mm in greatest extent with associated DCIS, intermediate grade measuring up to 2.0 cm.    On 2/24/2023 she had bilateral reduction mammoplasty by   [facility], with pathology negative for atypia or malignancy.     Breast Cancer Risk Factors:   Breast biopsies: 2 breast biopsies on the right breast that demonstrated fibroadenomas  OCPs: duration of 10 years in college  Menarche: around 7th grade, estimated at 15 years old  Gestational history: 3 pregnancies, 2 live births at 30 years and 32 years, 1  at 20 years  Currently using Mirena IUD for significant bleeding from fibroids. Estimated LMP is 2023  No first degree relatives with breast or ovarian cancer.  She does have a family history of a maternal great aunt and maternal first cousin with breast cancer.     Menstrual history: Menarche age 15.  First pregnancy age 20.  Ended in .  Pregnancies age 30 and 32 with live births.  She has fibroids has a Mirena IUD with light periods.  She did have a history of heavy menstrual bleeding related to the fibroids, before the Mirena IUD was placed.  No history of hormone replacement therapy.     PMH:   Attention deficit disorder  Allergies  Migraines  Fibroids  The patient does not have a history of low bone density, diabetes mellitus, hyperlipidemia, or hypertension     She has no history of breast surgery, breast cancer in the past, radiation therapy in the past, radiation exposure in the past, tumor of any kind, heart problems, heart attack, breathing problems, blood clots, seizures, arthritis, peptic ulcer disease, osteoporosis, bone fractures.  She is not currently participating in a clinical trial.     PSH:  Intrauterine device insertion     MEDICATIONS:  Cetirizine  Cyclobenzaprine  Fremanezumab-vfrm (Ajovy)  Methylphenidate  Sumatriptan  Tretinoin  Adderall  Rimegepant  Epinephrine 0.3 mg/0.3 mL     ALLERGY:  Bee pollen  Walnuts  Doxycycline  Sulfa  Wasps        Dog hair  - She follows with Dr. Rufus Cardenas at [facility]     FAMILY HISTORY:  Mother with skin cancer on the leg at 65 years  Father with prostate cancer at  62 years old, currently alive, no recurrence [id number]. [place] 3+4.  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   She has two adult children, 20 and 22 years old  Occasional alcohol use  Never smoker  Does not use any other illicits     ECOG PERFORMANCE STATUS: 0     HISTORY OF RADIATION: No  IMPLANTED CARDIAC DEVICE: No  PREGNANCY RISK: She does currently still have regular menstrual periods, but is using a Mirena IUD and has light periods. The last noted menstrual cycle completed on January 22, 2023.      GENETICS:  NF1 [id number] ([id number]) possibly mosaic Uncertain Significance     PATHOLOGY:  A. LEFT BREAST, SEED-LOCALIZED LUMPECTOMY:  - INVASIVE DUCTAL CARCINOMA, Grover grade 2, measuring 2.5 mm in greatest extent.  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  - Margins are negative for invasive carcinoma; closest uninvolved anterior margin is 6 mm.  - Margins are negative for DCIS; closest uninvolved inferior and medial margins are 1.5 mm.  - AJCC pathologic staging is pT1a pN0(sn).  - Biopsy marker and biopsy site changes present.  - Fibrocystic change and columnar cell change with associated calcifications.  - Invasive carcinoma is ER (60%, weak) and ND (30%, moderate) positive, HER2 IHC is pending and the result will be  reported in an addendum.     B. LEFT BREAST, NEW ANTERIOR MARGIN, EXCISION:  - Benign breast tissue with fibrocystic change, sclerosis adenosis and associated calcifications.  - Negative for atypia or malignancy.     C. LEFT BREAST, NEW LATERAL MARGIN, EXCISION:  - Benign breast tissue with fibrocystic change.  - Negative for atypia or malignancy.     D. LEFT BREAST, NEW MEDIAL MARGIN, EXCISION:  - Benign breast tissue with fibrocystic change, columnar cell change and associated calcifications.  - Negative for atypia or malignancy.     E. SENTINEL LYMPH NODE, LEFT AXILLA, BIOPSY #1:  - One lymph node, negative for malignancy (0/1).     F. SENTINEL LYMPH NODE, LEFT AXILLA, BIOPSY #2:  - One lymph node, negative for malignancy (0/1).     TREATMENT HISTORY:  A. Diagnosis with [id number] invasive ductal cancer of the left breast.  - Invasive carcinoma is ER (60%, weak) and GA (30%, moderate) positive,  HER2 negative 1+  B. Mirana IUD removed.  C. Lumpectomy  D. Bilateral mammoplasty breast reduction.  Follow up with Dr. Arnold.  E. Radiation  Treatment Summary:  Radiation Oncology - Course: 1         Protocol:   Treatment Site            Current Dose   Modality           From    To        Elapsed Days  Fx.  1 Left Breast     4,240 cGy       06 X      4/03/2023        4/28/2023        25       16                                                Dose per Fraction: 265 cGy   Total Dose:      4240 cGy  F. Hormonal therapy with tamoxifen planned.         COVID-19 vaccination  - She has had her last (4th) booster in November 2022.      INTERVAL HISTORY:    She is here today unaccompanied and feeling well overall.  -Her son is studying in [facility] this semester so she was able to visit when she brought him there to move in.  She and a friend then backpack together and visited [place], [place], and [place].  She had a wonderful time and did well on the trip overall.  -In past visits she has noted intermittent  shortness of breath, she has been monitoring this, and has realized that this happens a few days before her period.  Takes guaifenesin and it goes away.  Denies any cough, chest pain or shortness of breath at rest.  She hiked on her recent trip and did well with no concerns.  -She continues on tamoxifen 5 mg a day and is tolerating this well.  -She reports some minor joint pains in her knees occasionally.  -She denies hot flashes.  -Continues menstruation monthly, not as heavy as they used to be r/t her fibroids.  -When she first started tamoxifen, it affected her mood and sleep, but this has resolved.       REVIEW OF SYSTEMS: A 10 point review of systems was negative.    PHYSICAL EXAM:  /62 (BP Location: Right arm, Patient Position: Sitting, Cuff Size: Adult Regular)   Pulse 79   Temp 97.9  F (36.6  C) (Oral)   Resp 16   Wt 77.9 kg (171 lb 11.2 oz)   SpO2 98%   BMI 28.75 kg/m    General: She appears generally well.  Lymph: No cervical supraclavicular subclavicular or axillary lymphadenopathy.  Lungs: Clear to auscultation bilaterally.  Heart: Regular rate and rhythm S1-S2.  Abdomen: Soft nontender without hepatosplenomegaly.  Extremities: Without edema.  Mood and affect were normal.  Breast: Post-left lumpectomy and bilateral reduction mammoplasty.  Incisions well-healed with no underlying nodularity.  No overlying skin changes or erythema.  No masses noted.    LABS::  Most Recent 3 CBC's:  Recent Labs   Lab Test 02/15/24  1221 11/07/23  0957 09/12/23  1151   WBC 5.9 4.3 5.3   HGB 12.3 12.4 11.6*   MCV 87 81 77*    228 226   ANEUTAUTO 4.1 2.7 3.8     Most Recent 3 BMP's:  Recent Labs   Lab Test 02/15/24  1221 11/07/23  0957 09/12/23  1151    141 139   POTASSIUM 3.6 3.3* 3.9   CHLORIDE 105 106 106   CO2 27 27 24   BUN 12.5 12.3 11.7   CR 0.58 0.66 0.67   ANIONGAP 8 8 9   NATHANAEL 8.7 8.7 8.7   GLC 95 73 89   PROTTOTAL 6.6 6.7 7.0   ALBUMIN 3.8 3.9 4.0    Most Recent 3 LFT's:  Recent Labs   Lab  Test 02/15/24  1221 11/07/23  0957 09/12/23  1151   AST 19 20 21   ALT 19 9 15   ALKPHOS 45 48 42   BILITOTAL <0.2 0.3 0.2    Most Recent 2 TSH and T4:  Recent Labs   Lab Test 02/10/23  1541   TSH 2.03     I reviewed the above labs today.    IMAGING:  No new imaging to review today.     OTHER DATA:   NF Clinic note, MyChart messages, oncology and gyn/onc notes reviewed today prior to visit.     ASSESSMENT AND PLAN:   Philippe Tom is a 52 year old pre-menopausal woman with a recent diagnosis of a left breast  nuclear grade 3 DCIS with a microinvasive component 2.5mm  -Post-left lumpectomy, bilateral mammoplasty reduction, and radiation.  -She continues on tamoxifen 5mg and is tolerating this well overall.   -Her periods have been monthly, and her flow is much less.  -She will having imaging every 6 months alternating MRI with mammogram, MRI scheduled for May 2024.    Bone Health:  -150 minutes/week based on the lace and pathway study.  -VItaming D3 and Calcium.    NF1 gene:  -She has met with the specialist and this note is reviewed today.  -MRI brain scheduled next week, and she will have a video visit with the specialist to review the MRI.  -She was advised to call immediately if she feels any new lumps, bumps or pain in the area where she received radiation.  I do review this with her again today, and she is encouraged to call clinic with any concerns.  She states understanding of and agreement with this plan.    Follow up:  As scheduled with Dr. Judd in May 2024 with labs, breast MRI.     Gillian Quiroz, JOVANI, CNP  [facility]  [address] [place] [id number] [phone]    52 minutes spent on the date of the encounter doing chart review, review of outside records, review of test results, interpretation of tests, patient visit, and documentation.

## 2024-02-14 NOTE — LETTER
[facility] PEDS CNS TUMOR/NEUROFIBROMATOSIS        [facility]  9th [address]  [address] [place] [address]  Phone: [phone]     OUTPATIENT TEST REQUEST  Pediatric CNS Tumor/Neurofibromatosis Clinic  [facility]    Patient Name: Philippe Medellin  YOB: 1970  MR#: [id number]  Issue Date & Time: February 14, 2024 2:58 PM  Expiration Date: 2/13/25  DX: NF1- Migraines       Please complete the following tests for the continued care of our patients.  If you have any questions, please call at [phone] or [phone].       [x]     MRI Brain with and without contrast               Comment:   Patient with NF1 and history of Migraine headaches     Please fax Lab/Radiology Report to: Shana Ortiz RN, BSN  Fax: [phone]  Phone: [phone]    Please electronically 'push' imaging study to [facility] PACS system  (call Film File at [phone] after you push images so staff can retrieve them).         Trell Nuñez MD   Medical Director, Pediatric Neuro-oncology and Neurofibromatosis programs   Co-medical director, [facility] Pediatric Hematology Oncology

## 2024-02-15 ENCOUNTER — ONCOLOGY VISIT (OUTPATIENT)
Dept: ONCOLOGY | Facility: CLINIC | Age: 54
End: 2024-02-15
Attending: INTERNAL MEDICINE
Payer: MEDICAID

## 2024-02-15 ENCOUNTER — APPOINTMENT (OUTPATIENT)
Dept: LAB | Facility: CLINIC | Age: 54
End: 2024-02-15
Attending: NURSE PRACTITIONER
Payer: MEDICAID

## 2024-02-15 VITALS
BODY MASS INDEX: 27.91 KG/M2 | SYSTOLIC BLOOD PRESSURE: 122 MMHG | RESPIRATION RATE: 16 BRPM | HEART RATE: 84 BPM | DIASTOLIC BLOOD PRESSURE: 80 MMHG | WEIGHT: 165.9 LBS | OXYGEN SATURATION: 98 % | TEMPERATURE: 97.4 F

## 2024-02-15 DIAGNOSIS — B37.31 YEAST INFECTION OF THE VAGINA: Primary | ICD-10-CM

## 2024-02-15 DIAGNOSIS — Z17.0 MALIGNANT NEOPLASM OF NIPPLE OF LEFT BREAST IN FEMALE, ESTROGEN RECEPTOR POSITIVE (H): ICD-10-CM

## 2024-02-15 DIAGNOSIS — C50.012 MALIGNANT NEOPLASM OF NIPPLE OF LEFT BREAST IN FEMALE, ESTROGEN RECEPTOR POSITIVE (H): ICD-10-CM

## 2024-02-15 LAB
ALBUMIN SERPL BCG-MCNC: 3.8 G/DL (ref 3.5–5.2)
ALP SERPL-CCNC: 45 U/L (ref 40–150)
ALT SERPL W P-5'-P-CCNC: 19 U/L (ref 0–50)
ANION GAP SERPL CALCULATED.3IONS-SCNC: 8 MMOL/L (ref 7–15)
AST SERPL W P-5'-P-CCNC: 19 U/L (ref 0–45)
BASOPHILS # BLD AUTO: 0 10E3/UL (ref 0–0.2)
BASOPHILS NFR BLD AUTO: 0 %
BILIRUB SERPL-MCNC: <0.2 MG/DL
BUN SERPL-MCNC: 12.5 MG/DL (ref 6–20)
CALCIUM SERPL-MCNC: 8.7 MG/DL (ref 8.6–10)
CHLORIDE SERPL-SCNC: 105 MMOL/L (ref 98–107)
CREAT SERPL-MCNC: 0.58 MG/DL (ref 0.51–0.95)
DEPRECATED HCO3 PLAS-SCNC: 27 MMOL/L (ref 22–29)
EGFRCR SERPLBLD CKD-EPI 2021: >90 ML/MIN/1.73M2
EOSINOPHIL # BLD AUTO: 0.1 10E3/UL (ref 0–0.7)
EOSINOPHIL NFR BLD AUTO: 2 %
ERYTHROCYTE [DISTWIDTH] IN BLOOD BY AUTOMATED COUNT: 13.6 % (ref 10–15)
GLUCOSE SERPL-MCNC: 95 MG/DL (ref 70–99)
HCT VFR BLD AUTO: 38.2 % (ref 35–47)
HGB BLD-MCNC: 12.3 G/DL (ref 11.7–15.7)
IMM GRANULOCYTES # BLD: 0 10E3/UL
IMM GRANULOCYTES NFR BLD: 0 %
LYMPHOCYTES # BLD AUTO: 1.2 10E3/UL (ref 0.8–5.3)
LYMPHOCYTES NFR BLD AUTO: 20 %
MCH RBC QN AUTO: 27.9 PG (ref 26.5–33)
MCHC RBC AUTO-ENTMCNC: 32.2 G/DL (ref 31.5–36.5)
MCV RBC AUTO: 87 FL (ref 78–100)
MONOCYTES # BLD AUTO: 0.5 10E3/UL (ref 0–1.3)
MONOCYTES NFR BLD AUTO: 9 %
NEUTROPHILS # BLD AUTO: 4.1 10E3/UL (ref 1.6–8.3)
NEUTROPHILS NFR BLD AUTO: 69 %
NRBC # BLD AUTO: 0 10E3/UL
NRBC BLD AUTO-RTO: 0 /100
PLATELET # BLD AUTO: 223 10E3/UL (ref 150–450)
POTASSIUM SERPL-SCNC: 3.6 MMOL/L (ref 3.4–5.3)
PROT SERPL-MCNC: 6.6 G/DL (ref 6.4–8.3)
RBC # BLD AUTO: 4.41 10E6/UL (ref 3.8–5.2)
SODIUM SERPL-SCNC: 140 MMOL/L (ref 135–145)
WBC # BLD AUTO: 5.9 10E3/UL (ref 4–11)

## 2024-02-15 PROCEDURE — 80053 COMPREHEN METABOLIC PANEL: CPT | Performed by: NURSE PRACTITIONER

## 2024-02-15 PROCEDURE — 85025 COMPLETE CBC W/AUTO DIFF WBC: CPT | Performed by: NURSE PRACTITIONER

## 2024-02-15 PROCEDURE — 99215 OFFICE O/P EST HI 40 MIN: CPT | Performed by: NURSE PRACTITIONER

## 2024-02-15 PROCEDURE — 36415 COLL VENOUS BLD VENIPUNCTURE: CPT | Performed by: NURSE PRACTITIONER

## 2024-02-15 PROCEDURE — G0463 HOSPITAL OUTPT CLINIC VISIT: HCPCS | Performed by: NURSE PRACTITIONER

## 2024-02-15 RX ORDER — FLUCONAZOLE 150 MG/1
TABLET ORAL
Qty: 2 TABLET | Refills: 0 | Status: SHIPPED | OUTPATIENT
Start: 2024-02-15

## 2024-02-15 ASSESSMENT — PAIN SCALES - GENERAL: PAINLEVEL: NO PAIN (0)

## 2024-02-15 NOTE — LETTER
2/15/2024         RE: Philippe Medellin  [address]  [facility] [address]        Dear Colleague,    Thank you for referring your patient, Philippe Medellin, to the [facility]. Please see a copy of my visit note below.    MEDICAL ONCOLOGY FOLLOW UP NOTE     PROBLEM: New diagnosis of left sided DCIS with invasive component     HISTORY OF PRESENT ILLNESS: Ms. Tom is a 52 year old woman from [place], [place] with a diagnosis of left breast Nuclear Grade 3 ER+(15-20%) DCIS with less than 1 mm of invasive disease as part of workup following routine screening mammogram that detected calcifications.      Per prior notes, she had a routine screening mammogram on 12/27/2022 which demonstrated indeterminate grouped versus clustered fine microcalcifcations at the 2 o'clock position of the left breast at mid depth. Of note, she does have a history of previous breast biopsies in the right breast on 12/10/2015 that detected fibroadenomas at the 8 o'clock position, 5 cm from the nipple measuring 11 x 6 x 10 mm, and at the 10 o'clock position, 7 cm from the nipple measuring 8 x 4 x 7 mm. Within the 8 o'clock position, rare calcifications were noted adjacent to the fibroadenoma. She also had a diagnostic mammogram for a palpable lump in the left breast on 5/21/2019 at the 2 o'clock position. Focused left breast ultrasound demonstrated an anechoic cyst at the 2 o'clock psition, 8 cm from the nipple measuring 1.7 x 1.2 x 1.5 cm, noted to be benign. An additional anechoic cyst was noted at 1:30 position, 7 cm from the nipple measurign up to 8 mm and routine yearly mammography was recommended.     She underwent stereotactic guided biopsy at [facility] on 1/10/2023 with pathology (Specimen -[id number]) demonstrating Nuclear Grade 3 ER+ (11-20%, weak) DCIS with central comedonecrosis, with less than 1 mm of invasive disease noted within the specimen.     On  2/15/2023 she underwent seed-localized lumpectomy and SLN biopsy by Dr. Diggs, with pathology showing IDC grade 2, 2.5mm in greatest extent with associated DCIS, intermediate grade measuring up to 2.0 cm.    On 2023 she had bilateral reduction mammoplasty by Dr. Arnold, with pathology negative for atypia or malignancy.     Breast Cancer Risk Factors:   Breast biopsies: 2 breast biopsies on the right breast that demonstrated fibroadenomas  OCPs: duration of 10 years in college  Menarche: around 7th grade, estimated at 15 years old  Gestational history: 3 pregnancies, 2 live births at 30 years and 32 years, 1  at 20 years  Currently using Mirena IUD for significant bleeding from fibroids. Estimated LMP is 2023  No first degree relatives with breast or ovarian cancer.  She does have a family history of a maternal great aunt and maternal first cousin with breast cancer.     Menstrual history: Menarche age 15.  First pregnancy age 20.  Ended in .  Pregnancies age 30 and 32 with live births.  She has fibroids has a Mirena IUD with light periods.  She did have a history of heavy menstrual bleeding related to the fibroids, before the Mirena IUD was placed.  No history of hormone replacement therapy.     PMH:   Attention deficit disorder  Allergies  Migraines  Fibroids  The patient does not have a history of low bone density, diabetes mellitus, hyperlipidemia, or hypertension     She has no history of breast surgery, breast cancer in the past, radiation therapy in the past, radiation exposure in the past, tumor of any kind, heart problems, heart attack, breathing problems, blood clots, seizures, arthritis, peptic ulcer disease, osteoporosis, bone fractures.  She is not currently participating in a clinical trial.     PSH:  Intrauterine device insertion     MEDICATIONS:  Cetirizine  Cyclobenzaprine  Fremanezumab-vfrm  (Ajovy)  Methylphenidate  Sumatriptan  Tretinoin  Adderall  Rimegepant  Epinephrine 0.3 mg/0.3 mL     ALLERGY:  Bee pollen  Walnuts  Doxycycline  Sulfa  Wasps        Dog hair  - She follows with Dr. Rufus Cardenas at [facility]     FAMILY HISTORY:  Mother with skin cancer on the leg at 65 years  Father with prostate cancer at 62 years old, currently alive, no recurrence [id number]. Lilia 3+4.  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   She has two adult children, 20 and 22 years old  Occasional alcohol use  Never smoker  Does not use any other illicits     ECOG PERFORMANCE STATUS: 0     HISTORY OF RADIATION: No  IMPLANTED CARDIAC DEVICE: No  PREGNANCY RISK: She does currently still have regular menstrual periods, but is using a Mirena IUD and has light periods. The last noted menstrual cycle completed on January 22, 2023.      GENETICS:  NF1 [id number] ([id number]) possibly mosaic Uncertain Significance     PATHOLOGY:  A. LEFT BREAST, SEED-LOCALIZED LUMPECTOMY:  - INVASIVE DUCTAL CARCINOMA, Grover grade 2, measuring 2.5 mm in greatest extent.  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  - Margins are negative for invasive carcinoma; closest uninvolved anterior margin is 6 mm.  - Margins are negative for DCIS; closest  uninvolved inferior and medial margins are 1.5 mm.  - AJCC pathologic staging is pT1a pN0(sn).  - Biopsy marker and biopsy site changes present.  - Fibrocystic change and columnar cell change with associated calcifications.  - Invasive carcinoma is ER (60%, weak) and MN (30%, moderate) positive, HER2 IHC is pending and the result will be reported in an addendum.     B. LEFT BREAST, NEW ANTERIOR MARGIN, EXCISION:  - Benign breast tissue with fibrocystic change, sclerosis adenosis and associated calcifications.  - Negative for atypia or malignancy.     C. LEFT BREAST, NEW LATERAL MARGIN, EXCISION:  - Benign breast tissue with fibrocystic change.  - Negative for atypia or malignancy.     D. LEFT BREAST, NEW MEDIAL MARGIN, EXCISION:  - Benign breast tissue with fibrocystic change, columnar cell change and associated calcifications.  - Negative for atypia or malignancy.     E. SENTINEL LYMPH NODE, LEFT AXILLA, BIOPSY #1:  - One lymph node, negative for malignancy (0/1).     F. SENTINEL LYMPH NODE, LEFT AXILLA, BIOPSY #2:  - One lymph node, negative for malignancy (0/1).     TREATMENT HISTORY:  A. Diagnosis with [id number] invasive ductal cancer of the left breast.  - Invasive carcinoma is ER (60%, weak) and MN (30%, moderate) positive,  HER2 negative 1+  B. Mirana IUD removed.  C. Lumpectomy  D. Bilateral mammoplasty breast reduction.  Follow up with Dr. Arnold.  E. Radiation  Treatment Summary:  Radiation Oncology - Course: 1         Protocol:   Treatment Site            Current Dose   Modality           From    To        Elapsed Days  Fx.  1 Left Breast     4,240 cGy       06 X      4/03/2023        4/28/2023        25       16                                                Dose per Fraction: 265 cGy   Total Dose:      4240 cGy  F. Hormonal therapy with tamoxifen planned.         COVID-19 vaccination  - She has had her last (4th) booster in November 2022.      INTERVAL HISTORY:    She is here today unaccompanied and  feeling well overall.  -Her son is studying in [facility] this semester so she was able to visit when she brought him there to move in.  She and a friend then backpack together and visited [place], [place], [place], and [place].  She had a wonderful time and did well on the trip overall.  -In past visits she has noted intermittent shortness of breath, she has been monitoring this, and has realized that this happens a few days before her period.  Takes guaifenesin and it goes away.  Denies any cough, chest pain or shortness of breath at rest.  She hiked on her recent trip and did well with no concerns.  -She continues on tamoxifen 5 mg a day and is tolerating this well.  -She reports some minor joint pains in her knees occasionally.  -She denies hot flashes.  -Continues menstruation monthly, not as heavy as they used to be r/t her fibroids.  -When she first started tamoxifen, it affected her mood and sleep, but this has resolved.       REVIEW OF SYSTEMS: A 10 point review of systems was negative.    PHYSICAL EXAM:  /62 (BP Location: Right arm, Patient Position: Sitting, Cuff Size: Adult Regular)   Pulse 79   Temp 97.9  F (36.6  C) (Oral)   Resp 16   Wt 77.9 kg (171 lb 11.2 oz)   SpO2 98%   BMI 28.75 kg/m    General: She appears generally well.  Lymph: No cervical supraclavicular subclavicular or axillary lymphadenopathy.  Lungs: Clear to auscultation bilaterally.  Heart: Regular rate and rhythm S1-S2.  Abdomen: Soft nontender without hepatosplenomegaly.  Extremities: Without edema.  Mood and affect were normal.  Breast: Post-left lumpectomy and bilateral reduction mammoplasty.  Incisions well-healed with no underlying nodularity.  No overlying skin changes or erythema.  No masses noted.    LABS::  Most Recent 3 CBC's:  Recent Labs   Lab Test 02/15/24  1221 11/07/23  0957 09/12/23  1151   WBC 5.9 4.3 5.3   HGB 12.3 12.4 11.6*   MCV 87 81 77*    228 226   ANEUTAUTO 4.1 2.7 3.8     Most Recent 3  BMP's:  Recent Labs   Lab Test 02/15/24  1221 11/07/23  0957 09/12/23  1151    141 139   POTASSIUM 3.6 3.3* 3.9   CHLORIDE 105 106 106   CO2 27 27 24   BUN 12.5 12.3 11.7   CR 0.58 0.66 0.67   ANIONGAP 8 8 9   NATHANAEL 8.7 8.7 8.7   GLC 95 73 89   PROTTOTAL 6.6 6.7 7.0   ALBUMIN 3.8 3.9 4.0    Most Recent 3 LFT's:  Recent Labs   Lab Test 02/15/24  1221 11/07/23  0957 09/12/23  1151   AST 19 20 21   ALT 19 9 15   ALKPHOS 45 48 42   BILITOTAL <0.2 0.3 0.2    Most Recent 2 TSH and T4:  Recent Labs   Lab Test 02/10/23  1541   TSH 2.03     I reviewed the above labs today.    IMAGING:  No new imaging to review today.     OTHER DATA:   NF Clinic note, MyChart messages, oncology and gyn/onc notes reviewed today prior to visit.     ASSESSMENT AND PLAN:   Philippe Tom is a 52 year old pre-menopausal woman with a recent diagnosis of a left breast  nuclear grade 3 DCIS with a microinvasive component 2.5mm  -Post-left lumpectomy, bilateral mammoplasty reduction, and radiation.  -She continues on tamoxifen 5mg and is tolerating this well overall.   -Her periods have been monthly, and her flow is much less.  -She will having imaging every 6 months alternating MRI with mammogram, MRI scheduled for May 2024.    Bone Health:  -150 minutes/week based on the lace and pathway study.  -VItaming D3 and Calcium.    NF1 gene:  -She has met with the specialist and this note is reviewed today.  -MRI brain scheduled next week, and she will have a video visit with the specialist to review the MRI.  -She was advised to call immediately if she feels any new lumps, bumps or pain in the area where she received radiation.  I do review this with her again today, and she is encouraged to call clinic with any concerns.  She states understanding of and agreement with this plan.    Follow up:  As scheduled with Dr. Judd in May 2024 with labs, breast MRI.     Gillian Quiroz, APRN, CNP  [facility] [address]  [place] [address]  [phone]    52 minutes spent on the date of the encounter doing chart review, review of outside records, review of test results, interpretation of tests, patient visit, and documentation.

## 2024-02-15 NOTE — NURSING NOTE
"Oncology Rooming Note    February 15, 2024 12:28 PM   Philippe Medellin is a 53 year old female who presents for:    Chief Complaint   Patient presents with    Blood Draw     Labs drawn via  by RN. VS taken.    Oncology Clinic Visit     Malignant neoplasm of nipple of left breast in female, estrogen receptor positive     Initial Vitals: /80 (BP Location: Right arm, Patient Position: Sitting, Cuff Size: Adult Regular)   Pulse 84   Temp 97.4  F (36.3  C) (Oral)   Resp 16   Wt 75.3 kg (165 lb 14.4 oz)   SpO2 98%   BMI 27.91 kg/m   Estimated body mass index is 27.91 kg/m  as calculated from the following:    Height as of 2/14/24: 1.642 m (5' 4.65\").    Weight as of this encounter: 75.3 kg (165 lb 14.4 oz). Body surface area is 1.85 meters squared.  No Pain (0) Comment: Data Unavailable   No LMP recorded.  Allergies reviewed: Yes  Medications reviewed: Yes    Medications: Medication refills not needed today.  Pharmacy name entered into EPIC:    [facility] [place]  [id number] - [place], [place] - [address]    Frailty Screening:   Is the patient here for a new oncology consult visit in cancer care? 2. No      Clinical concerns: none       Nataliya Smith"

## 2024-02-15 NOTE — NURSING NOTE
Chief Complaint   Patient presents with    Blood Draw     Labs drawn via  by RN. VS taken.     Labs collected from venipuncture by RN. Vitals taken. Checked in for appointment(s).     Halima Caldera RN

## 2024-02-20 ENCOUNTER — TRANSFERRED RECORDS (OUTPATIENT)
Dept: PEDIATRIC HEMATOLOGY/ONCOLOGY | Facility: CLINIC | Age: 54
End: 2024-02-20
Payer: MEDICAID

## 2024-02-21 ENCOUNTER — TELEPHONE (OUTPATIENT)
Dept: PEDIATRIC HEMATOLOGY/ONCOLOGY | Facility: CLINIC | Age: 54
End: 2024-02-21
Payer: MEDICAID

## 2024-02-21 NOTE — TELEPHONE ENCOUNTER
Called Philippe to let her know her Brain MRI results from [facility] were back. Per  they were normal. Nothing concerning. There was nothing to explain her headaches. I let he know we would send her the report in the mail. Philippe indicated she was relieved everything was normal and will call if she has any concerns.   Will continue to follow.

## 2024-04-13 DIAGNOSIS — Z17.0 MALIGNANT NEOPLASM OF UPPER-OUTER QUADRANT OF LEFT BREAST IN FEMALE, ESTROGEN RECEPTOR POSITIVE (H): ICD-10-CM

## 2024-04-13 DIAGNOSIS — C50.412 MALIGNANT NEOPLASM OF UPPER-OUTER QUADRANT OF LEFT BREAST IN FEMALE, ESTROGEN RECEPTOR POSITIVE (H): ICD-10-CM

## 2024-04-15 RX ORDER — TAMOXIFEN CITRATE 10 MG/1
TABLET ORAL
Qty: 45 TABLET | Refills: 2 | Status: SHIPPED | OUTPATIENT
Start: 2024-04-15

## 2024-04-15 NOTE — TELEPHONE ENCOUNTER
Medication:Tamoxifen  Last prescribing provider:Dr. Judd  Last clinic visit date: 2/15/2024 Gillian HAHN  Recommendations for requested medication:  Copied and pasted from last provider note    Any other pertinent information:routed to last provider Gillian Quiroz

## 2024-05-05 NOTE — PROGRESS NOTES
MEDICAL ONCOLOGY FOLLOW UP NOTE     Philippe Tom  Female, 52 year old, 1970  MRN:   [id number]           Dear Dr. Diggs,     Thank you for referring Philippe Tom regarding a newly diagnosed DCIS with a microinvasive component.     PROBLEM: New diagnosis of left sided DCIS with invasive component     HISTORY OF PRESENT ILLNESS: Ms. Tom is a 53 year old woman from [place], [place] with a recent diagnosis of left breast Nuclear Grade 3 ER+(15-20%) DCIS with less than 1 mm of invasive disease as part of workup following routine screening mammogram that detected calcifications. She has not undergone surgical management as of yet, and is establishing care at the [facility] with Medical Oncology, and will be seeing Dr. Noman Diggs for surgical consultation on 1/27/2023.      Her oncologic diagnosis came to light after routine screening mammogram on 12/27/2022 demonstrated indeterminate grouped versus clustered fine microcalcifcations at the 2 o'clock position of the left breast at mid depth. Of note, she does have a history of previous breast biopsies in the right breast on 12/10/2015 that detected fibroadenomas at the 8 o'clock position, 5 cm from the nipple measuring 11 x 6 x 10 mm, and at the 10 o'clock position, 7 cm from the nipple measuring 8 x 4 x 7 mm. Within the 8 o'clock position, rare calcifications were noted adjacent to the fibroadenoma. She also had a diagnostic mammogram for a palpable lump in the left breast on 5/21/2019 at the 2 o'clock position. Focused left breast ultrasound demonstrated an anechoic cyst at the 2 o'clock psition, 8 cm from the nipple measuring 1.7 x 1.2 x 1.5 cm, noted to be benign. An additional anechoic cyst was noted at 1:30 position, 7 cm from the nipple measurign up to 8 mm and routine yearly mammography was recommended.     She underwent stereotactic guided biopsy at [facility] on 1/10/2023 with pathology (Specimen -[id number])  demonstrating Nuclear Grade 3 ER+ (11-20%, weak) DCIS with central comedonecrosis, with less than 1 mm of invasive disease noted within the specimen.      She was seen by Dr. Kelly Garcia for surgical consultation on 2023 who discussed surgical options with the patient. Note unavailable in care everywhere    She saw Medical Genetics at [facility] on 2023, seeing Sasha Chacko. [facility]'s STAT breast panel was sent.      SUMMARY OF OUTSIDE STUDIES:  Patient had bilateral screening mammograms on 22 and indeterminate grouped versus clustered fine microcalcifications at the 2:00 position of the left breast, middle depth. Recommend correlation with a mediolateral view and spot magnification CC and MLO views of the left breast for further evaluation. Diagnostic imaging followed on  and clustered microcalcifications in the upper outer aspect of the left breast are suspicious of malignancy. Recommend stereotactically guided biopsy.      1/10/23 - Left breast, stereotactic needle core biopsy at 2:00:  [facility].   Left breast, needle core biopsy at 2:00: Ductal carcinoma in situ with   calcifications and at least microinvasion.    Patient states this was left invasive ductal carcinoma,  ER+    <1mm of invasive -- micro  23 - She did meet with Karley Garcia - consult note needed  Genetic counseling consult scheduled for  at [facility] - consult note needed.        Breast Cancer Risk Factors:   Breast biopsies: 2 breast biopsies on the right breast that demonstrated fibroadenomas  OCPs: duration of 10 years in college  Menarche: around 7th grade, estimated at 15 years old  Gestational history: 3 pregnancies, 2 live births at 30 years and 32 years, 1  at 20 years  Currently using Mirena IUD for significant bleeding from fibroids. Estimated LMP is 2023  No first degree relatives with breast or ovarian  cancer.  She does have a family history of a maternal great aunt and maternal first cousin with breast cancer.     Menstrual history: Menarche age 15.  First pregnancy age 20.  Ended in .  Pregnancies age 30 and 32 with live births.  She has fibroids has a Mirena IUD with light periods.  She did have a history of heavy menstrual bleeding related to the fibroids, before the Mirena IUD was placed.  No history of hormone replacement therapy.     PMH:   Attention deficit disorder  Allergies  Migraines  Fibroids  The patient does not have a history of low bone density, diabetes mellitus, hyperlipidemia, or hypertension     She has no history of breast surgery, breast cancer in the past, radiation therapy in the past, radiation exposure in the past, tumor of any kind, heart problems, heart attack, breathing problems, blood clots, seizures, arthritis, peptic ulcer disease, osteoporosis, bone fractures.  She is not currently participating in a clinical trial.     PSH:  Intrauterine device insertion     ALLERGY:  Bee pollen  Walnuts  Doxycycline  Sulfa  Wasps       Dog hair  - She follows with Dr. Rufus Cardenas at [facility]     FAMILY HISTORY:  Mother with skin cancer on the leg at 65 years  Father with prostate cancer at 62 years old, currently alive, no recurrence [id number]. Lilia 3+4.  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 The last noted menstrual cycle completed on January 22, 2023.      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 LEFT BREAST, SEED-LOCALIZED LUMPECTOMY:  - INVASIVE DUCTAL CARCINOMA, Grover grade 2, measuring 2.5 mm in greatest extent.  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  - Margins are negative for invasive carcinoma; closest uninvolved anterior margin is 6 mm.  - Margins are negative for DCIS; closest uninvolved inferior and medial margins are 1.5 mm.  - AJCC pathologic staging is pT1a pN0(sn).  - Biopsy marker and biopsy site changes present.  - Fibrocystic change and columnar cell change with associated calcifications.  - Invasive carcinoma is ER (60%, weak) and DE (30%, moderate) positive, HER2 IHC is pending and the result will be reported in an addendum.     B. LEFT BREAST, NEW ANTERIOR MARGIN, EXCISION:  - Benign breast tissue with fibrocystic change, sclerosis adenosis and associated calcifications.  - Negative for atypia or malignancy.     C. LEFT BREAST, NEW LATERAL MARGIN, EXCISION:  - Benign breast tissue with fibrocystic change.  - Negative for atypia or malignancy.     D. LEFT BREAST, NEW MEDIAL MARGIN, EXCISION:  - Benign breast tissue with fibrocystic change, columnar cell change and associated calcifications.  - Negative for atypia or malignancy.     E. SENTINEL LYMPH NODE, LEFT AXILLA, BIOPSY #1:  - One lymph node, negative for malignancy (0/1).     F. SENTINEL LYMPH NODE, LEFT AXILLA, BIOPSY #2:  - One lymph node, negative for malignancy (0/1).      TREATMENT HISTORY:  A.  Diagnosis with [id number] invasive ductal cancer of the left breast.  - Invasive carcinoma is ER (60%, weak) and MA (30%, moderate) positive,  HER2 negative 1+  B.  Mirana IUD removed.  C.  Lumpectomy WOODY quadrant.   D.  Bilateral mammoplasty breast reduction.  Follow up with Dr. Arnold.  E.  Radiation  Treatment Summary:  Radiation Oncology - Course: 1         Protocol:   Treatment Site            Current Dose   Modality           From    To        Elapsed Days  Fx.  1 Left Breast     4,240 cGy       06 X                  4/03/2023        4/28/2023        25       16                                                Dose per Fraction: 265 cGy   Total Dose:      4240 cGy  F.  Hormonal therapy with tamoxifen 5 mg per day begun .  She does not want regular dosing of 20 mg per day.  There are issues with fibroids and heavy periods. She did have some difficulty tolerating 5 mg per day.   G.    A. Breast, left, reduction mammoplasty:  --Benign breast tissue with nonproliferative fibrocystic change, 249.7 grams.  --Negative for carcinoma or atypical breast proliferative disease.  B. Breast, right, reduction mammoplasty:  --Benign breast tissue with fibrocystic change including usual ductal hyperplasia, 299.2 grams.  --Negative for carcinoma or atypical breast proliferative disease.      INTERVAL HISTORY:    Philippe and her  come to clinic for recommendations regarding imaging follow-up as well as implications of NF 1 germline mosaicism.  She continues on low-dose tamoxifen 5 mg/day and is tolerated quite well.  She does have significant joint stiffness with tamoxifen 5 mg/day.  She does have heavy periods and they are regular.  She affirmed that she did not want to go with a higher dose of tamoxifen because of concerns about side effects from this medication and we will stay at 5 mg/day.     REVIEW OF SYSTEMS: A 10 point review of systems was negative.     PHYSICAL EXAM:  VITALS:  /79 (BP  Location: Right arm, Patient Position: Sitting, Cuff Size: Adult Regular)   Pulse 81   Resp 16   Wt 77.6 kg (171 lb)   SpO2 97%   BMI 28.77 kg/m    General: She appears generally well.  Oropharynx: Good dentition no lesions lymph:  No cervical supraclavicular subclavicular or axillary lymphadenopathy  Breast exam: Examination of the right breast reveals a mammoplasty incision at the 6 o'clock position and a circumareolar incision both of which are well-healed without erythema or masses.  In the right breast in the anterior axillary line at nipple level there is a 3 mm maculopapular lesion which the patient says is new over the last week or 2 and she thinks it is an insect bite.  Examination left breast reveals a mammoplasty incision at the 6 o'clock position and a circumareolar incision which is well-healed without erythema or masses.  In addition in the left breast at the 2 o'clock position there is a well-healed incision.  Slightly less prominent left nipple but everted less than right nipple.  The left axillary incisions well-healed without erythema or masses.  There are no masses in either breast.  There are postradiation pigmentation changes and pigmentation of the left breast.  Lungs: Clear to percussion auscultation  Heart: Regular rate and rhythm S1-S2.  Abdomen: Soft nontender without hepatosplenomegaly.  Extremities: Without edema.  Mood and affect were normal.     LABS:  CMP showed a low calcium of 7.7 otherwise normal.  CBC showed Hb 11.6, MCV 77 and RDW of 16 Absolute neutrophils of 3.8K     Imaging:  Breast MRI May 6, 2024    FINDINGS: Posttreatment changes of left breast conservation therapy.  No suspicious enhancement in either breast. No lymphadenopathy.                                                                      IMPRESSION: BI-RADS CATEGORY: 2 - Benign.     RECOMMENDED FOLLOW-UP: Age and risk appropriate breast cancer  screening.      I have personally reviewed the examination and  initial interpretation  and I agree with the findings.     JOSELINE CONROY MD     I reviewed the breast MRI with Dr. Deal and in the right breast corresponding skin lesion is noted.  About 3 mm in size.     ASSESSMENT AND PLAN:   1.  Philippe Tom is a 53 year old pre-menopausal woman with a recent diagnosis of a left breast  nuclear grade 3 DCIS with a 2.5 mm invasive component.  [id number] ER+ HER2-.  ECOG 0.   2.  She underwent left upper outer quadrant lumpectomy and then had bilateral breast reduction. Philippe is seen today approximately 4 months after the completion of radiation.  There are pigmentation changes and skin thickening of the left breast. Radiation to left breast.  Completed April 28.    3.  Adjuvant hormonal therapy. T1a invasive compoinent.  She has tolerated tamoxifen 5 mg/day with some hot flashes and disruption of sleep but otherwise has been tolerating it reasonably well. She has a history of fibroids and heavy menses.  I discussed with her if she wanted to increase the tamoxifen to the recommended 20 mg/day and she does not want to.  She thinks that it would be better to stay at the 5 mg/day dose with fewer side effects I think that there will be better compliance with tamoxifen 5 mg per day for 5 years and she agreed.   4.  Return to clinic in 1 month for breast exam with attention to the right breast maculopapular lesion.  5.  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  We will refer her to Dr. Laura Osei for recommendations.  I do think that intensive surveillance would be reasonable consisting of mammography and MRI each performed yearly and spaced 6 months apart. She understands risks of false positive findings and does want this approach.  I would start with a mammogram 6 months after the completion of radiation which would be in November 2023.  She does have 2 children aged 20 and 22 and I discussed that it may be reasonable to screen them for NF1.  Another  question is whether a skin biopsy would be useful for confirming mosaicism for NF1 mutation.  6.  If possible, do genetic testing on tumor tissue to confirm that the NF1 alteration is pathogenic.   7.  Fibroids.  Notably despite having fibroids her vaginal bleeding is not increased with tamoxifen is rather decreased.  Also it is reassuring that she does not have bleeding between periods.  Colonoscopy was performed at [facility].  I asked her to forward the colonoscopy report to us.  8. Iron deficiency anemia.  May be related to fibroids. Iron could be prescribed for 3 months.   9.  Consideration of hysterectomy and oophorectomy. This may be an issue, though, with the fibroids and a CLEM-BSO may be a better option for her in the long term.  10.  Recommendations of exercise of 150 minutes/week based on the lace and pathway study.  11. Recommendation of a Mediterranean style diet low in saturated fat but not low in fat with more fruits and vegetables and less red meat.   12.  Recommendation for bone health includes vitamin D3 2000 international units daily and calcium 1 g/day either by diet or reading labels.  We also recommend bone strengthening exercises including stretch band hand weights and yoga.  13.  Migraines, associated with menses.    14.  Colonoscopy at [facility] in April. One polyp  5 years follow up. Sister had 3 polyps.   15.  Follow up.  Follow-up with me June 11 for repeat breast exam.  Follow up with Gillian August 7 with CBC, CMP.  Follow up with me November 12 with CBC, CMP,  mammogram.       Thank you for allowing us to participate in this patient's care.     Sincerely,      Marc Judd MD  Professor  [facility]  [phone]        I spent 40 minutes with the patient more than 50% of which was in counseling and coordination of care.

## 2024-05-06 ENCOUNTER — ANCILLARY PROCEDURE (OUTPATIENT)
Dept: MRI IMAGING | Facility: CLINIC | Age: 54
End: 2024-05-06
Attending: INTERNAL MEDICINE
Payer: MEDICAID

## 2024-05-06 ENCOUNTER — LAB (OUTPATIENT)
Dept: LAB | Facility: CLINIC | Age: 54
End: 2024-05-06
Attending: INTERNAL MEDICINE
Payer: MEDICAID

## 2024-05-06 DIAGNOSIS — Z17.0 MALIGNANT NEOPLASM OF NIPPLE OF LEFT BREAST IN FEMALE, ESTROGEN RECEPTOR POSITIVE (H): ICD-10-CM

## 2024-05-06 DIAGNOSIS — C50.012 MALIGNANT NEOPLASM OF NIPPLE OF LEFT BREAST IN FEMALE, ESTROGEN RECEPTOR POSITIVE (H): ICD-10-CM

## 2024-05-06 LAB
ALBUMIN SERPL BCG-MCNC: 3.5 G/DL (ref 3.5–5.2)
ALP SERPL-CCNC: 39 U/L (ref 40–150)
ALT SERPL W P-5'-P-CCNC: 10 U/L (ref 0–50)
ANION GAP SERPL CALCULATED.3IONS-SCNC: 8 MMOL/L (ref 7–15)
AST SERPL W P-5'-P-CCNC: 15 U/L (ref 0–45)
BASOPHILS # BLD AUTO: 0 10E3/UL (ref 0–0.2)
BASOPHILS NFR BLD AUTO: 1 %
BILIRUB SERPL-MCNC: <0.2 MG/DL
BUN SERPL-MCNC: 12.9 MG/DL (ref 6–20)
CALCIUM SERPL-MCNC: 7.7 MG/DL (ref 8.6–10)
CHLORIDE SERPL-SCNC: 111 MMOL/L (ref 98–107)
CREAT SERPL-MCNC: 0.57 MG/DL (ref 0.51–0.95)
DEPRECATED HCO3 PLAS-SCNC: 23 MMOL/L (ref 22–29)
EGFRCR SERPLBLD CKD-EPI 2021: >90 ML/MIN/1.73M2
EOSINOPHIL # BLD AUTO: 0.1 10E3/UL (ref 0–0.7)
EOSINOPHIL NFR BLD AUTO: 3 %
ERYTHROCYTE [DISTWIDTH] IN BLOOD BY AUTOMATED COUNT: 13.5 % (ref 10–15)
GLUCOSE SERPL-MCNC: 89 MG/DL (ref 70–99)
HCT VFR BLD AUTO: 37.9 % (ref 35–47)
HGB BLD-MCNC: 12.5 G/DL (ref 11.7–15.7)
IMM GRANULOCYTES # BLD: 0 10E3/UL
IMM GRANULOCYTES NFR BLD: 1 %
LYMPHOCYTES # BLD AUTO: 1.1 10E3/UL (ref 0.8–5.3)
LYMPHOCYTES NFR BLD AUTO: 25 %
MCH RBC QN AUTO: 28.3 PG (ref 26.5–33)
MCHC RBC AUTO-ENTMCNC: 33 G/DL (ref 31.5–36.5)
MCV RBC AUTO: 86 FL (ref 78–100)
MONOCYTES # BLD AUTO: 0.5 10E3/UL (ref 0–1.3)
MONOCYTES NFR BLD AUTO: 11 %
NEUTROPHILS # BLD AUTO: 2.6 10E3/UL (ref 1.6–8.3)
NEUTROPHILS NFR BLD AUTO: 59 %
NRBC # BLD AUTO: 0 10E3/UL
NRBC BLD AUTO-RTO: 0 /100
PLATELET # BLD AUTO: 206 10E3/UL (ref 150–450)
POTASSIUM SERPL-SCNC: 3.5 MMOL/L (ref 3.4–5.3)
PROT SERPL-MCNC: 6.1 G/DL (ref 6.4–8.3)
RBC # BLD AUTO: 4.42 10E6/UL (ref 3.8–5.2)
SODIUM SERPL-SCNC: 142 MMOL/L (ref 135–145)
WBC # BLD AUTO: 4.3 10E3/UL (ref 4–11)

## 2024-05-06 PROCEDURE — A9585 GADOBUTROL INJECTION: HCPCS

## 2024-05-06 PROCEDURE — 84295 ASSAY OF SERUM SODIUM: CPT

## 2024-05-06 PROCEDURE — 85025 COMPLETE CBC W/AUTO DIFF WBC: CPT

## 2024-05-06 PROCEDURE — 36415 COLL VENOUS BLD VENIPUNCTURE: CPT

## 2024-05-06 PROCEDURE — 77049 MRI BREAST C-+ W/CAD BI: CPT

## 2024-05-06 RX ORDER — GADOBUTROL 604.72 MG/ML
7.5 INJECTION INTRAVENOUS ONCE
Status: COMPLETED | OUTPATIENT
Start: 2024-05-06 | End: 2024-05-06

## 2024-05-06 RX ADMIN — GADOBUTROL 7.5 ML: 604.72 INJECTION INTRAVENOUS at 10:56

## 2024-05-06 NOTE — DISCHARGE INSTRUCTIONS
MRI Contrast Discharge Instructions    The IV contrast you received today will pass out of your body in your  urine. This will happen in the next 24 hours. You will not feel this process.  Your urine will not change color.    Drink at least 4 extra glasses of water or juice today (unless your doctor  has restricted your fluids). This reduces the stress on your kidneys.  You may take your regular medicines.    If you are on dialysis: It is best to have dialysis today.    If you have a reaction: Most reactions happen right away. If you have  any new symptoms after leaving the hospital (such as hives or swelling),  call your hospital at the correct number below. Or call your family doctor.  If you have breathing distress or wheezing, call 911.    Special instructions: ***    I have read and understand the above information.    Signature:______________________________________ Date:___________    Staff:__________________________________________ Date:___________     Time:__________    [facility] Departments:    ___[place]: [phone]  ___[facility]: [phone]  ___[place]: [phone] ___[facility]: [phone]  ___[facility]: [phone]  ___[facility]: [phone]  ___[phone]  ___[facility]: [phone]  ___[phone]

## 2024-05-06 NOTE — NURSING NOTE
Chief Complaint   Patient presents with    Lab Only     Labs drawn via piv by RN.      Labs drawn via previously-placed PIV by RN. PIV removed after lab draw.     Brii Lopez RN

## 2024-05-07 ENCOUNTER — ONCOLOGY VISIT (OUTPATIENT)
Dept: ONCOLOGY | Facility: CLINIC | Age: 54
End: 2024-05-07
Attending: INTERNAL MEDICINE
Payer: MEDICAID

## 2024-05-07 VITALS
RESPIRATION RATE: 16 BRPM | DIASTOLIC BLOOD PRESSURE: 79 MMHG | OXYGEN SATURATION: 97 % | SYSTOLIC BLOOD PRESSURE: 123 MMHG | BODY MASS INDEX: 28.77 KG/M2 | HEART RATE: 81 BPM | WEIGHT: 171 LBS

## 2024-05-07 DIAGNOSIS — C50.412 MALIGNANT NEOPLASM OF UPPER-OUTER QUADRANT OF LEFT BREAST IN FEMALE, ESTROGEN RECEPTOR POSITIVE (H): Primary | ICD-10-CM

## 2024-05-07 DIAGNOSIS — Z17.0 MALIGNANT NEOPLASM OF UPPER-OUTER QUADRANT OF LEFT BREAST IN FEMALE, ESTROGEN RECEPTOR POSITIVE (H): Primary | ICD-10-CM

## 2024-05-07 PROCEDURE — G0463 HOSPITAL OUTPT CLINIC VISIT: HCPCS | Performed by: INTERNAL MEDICINE

## 2024-05-07 PROCEDURE — 99215 OFFICE O/P EST HI 40 MIN: CPT | Performed by: INTERNAL MEDICINE

## 2024-05-07 ASSESSMENT — PAIN SCALES - GENERAL: PAINLEVEL: NO PAIN (0)

## 2024-05-07 NOTE — NURSING NOTE
"Oncology Rooming Note    May 7, 2024 10:23 AM   Philippe Medellin is a 53 year old female who presents for:    Chief Complaint   Patient presents with    Oncology Clinic Visit     Malignant Neoplasm of the Left Breast     Initial Vitals: /79 (BP Location: Right arm, Patient Position: Sitting, Cuff Size: Adult Regular)   Pulse 81   Resp 16   Wt 77.6 kg (171 lb)   SpO2 97%   BMI 28.77 kg/m   Estimated body mass index is 28.77 kg/m  as calculated from the following:    Height as of 2/14/24: 1.642 m (5' 4.65\").    Weight as of this encounter: 77.6 kg (171 lb). Body surface area is 1.88 meters squared.  No Pain (0) Comment: Data Unavailable   No LMP recorded.  Allergies reviewed: Yes  Medications reviewed: Yes    Medications: MEDICATION REFILLS NEEDED TODAY. Provider was notified.  Pharmacy name entered into EPIC:    [facility] [place] [place]  [id number] [address]    Frailty Screening:   Is the patient here for a new oncology consult visit in cancer care? 2. No      Clinical concerns: Pt states her nipple on her left breast has inverted somewhat.       Carmita Fountain LPN  5/7/2024              "

## 2024-05-07 NOTE — LETTER
5/7/2024         RE: Philippe Medellin  [address]  [facility] [id number]        Dear Colleague,    Thank you for referring your patient, Philippe Medellin, to the [facility]. Please see a copy of my visit note below.    MEDICAL ONCOLOGY FOLLOW UP NOTE     Philippe Tom  Female, 52 year old, 1970  MRN:   [id number]           Dear Dr. Diggs,     Thank you for referring Philippe Tom regarding a newly diagnosed DCIS with a microinvasive component.     PROBLEM: New diagnosis of left sided DCIS with invasive component     HISTORY OF PRESENT ILLNESS: Ms. Tom is a 53 year old woman from [place], [place] with a recent diagnosis of left breast Nuclear Grade 3 ER+(15-20%) DCIS with less than 1 mm of invasive disease as part of workup following routine screening mammogram that detected calcifications. She has not undergone surgical management as of yet, and is establishing care at the [facility] with Medical Oncology, and will be seeing Dr. Noman Diggs for surgical consultation on 1/27/2023.      Her oncologic diagnosis came to light after routine screening mammogram on 12/27/2022 demonstrated indeterminate grouped versus clustered fine microcalcifcations at the 2 o'clock position of the left breast at mid depth. Of note, she does have a history of previous breast biopsies in the right breast on 12/10/2015 that detected fibroadenomas at the 8 o'clock position, 5 cm from the nipple measuring 11 x 6 x 10 mm, and at the 10 o'clock position, 7 cm from the nipple measuring 8 x 4 x 7 mm. Within the 8 o'clock position, rare calcifications were noted adjacent to the fibroadenoma. She also had a diagnostic mammogram for a palpable lump in the left breast on 5/21/2019 at the 2 o'clock position. Focused left breast ultrasound demonstrated an anechoic cyst at the 2 o'clock psition, 8 cm from the nipple measuring 1.7 x 1.2 x 1.5 cm, noted to be benign. An  additional anechoic cyst was noted at 1:30 position, 7 cm from the nipple measurign up to 8 mm and routine yearly mammography was recommended.     She underwent stereotactic guided biopsy at [facility] on 1/10/2023 with pathology (Specimen -[id number]) demonstrating Nuclear Grade 3 ER+ (11-20%, weak) DCIS with central comedonecrosis, with less than 1 mm of invasive disease noted within the specimen.      She was seen by Dr. Kelly Garcia for surgical consultation on 1/19/2023 who discussed surgical options with the patient. Note unavailable in care everywhere    She saw Medical Genetics at [facility] on 1/25/2023, seeing Sasha Chacko. [facility]'s STAT breast panel was sent.      SUMMARY OF OUTSIDE STUDIES:  Patient had bilateral screening mammograms on 12/27/22 and indeterminate grouped versus clustered fine microcalcifications at the 2:00 position of the left breast, middle depth. Recommend correlation with a mediolateral view and spot magnification CC and MLO views of the left breast for further evaluation. Diagnostic imaging followed on 12/30 and clustered microcalcifications in the upper outer aspect of the left breast are suspicious of malignancy. Recommend stereotactically guided biopsy.      1/10/23 - Left breast, stereotactic needle core biopsy at 2:00:  Biopsy at [facility] - pathology report needed.   Left breast, needle core biopsy at 2:00: Ductal carcinoma in situ with   calcifications and at least microinvasion.    Patient states this was left invasive ductal carcinoma,  ER+    <1mm of invasive -- micro  1/19/23 - She did meet with Karley Garcia - consult note needed  Genetic counseling consult scheduled for 1/25 at [facility] - consult note needed.        Breast Cancer Risk Factors:   Breast biopsies: 2 breast biopsies on the right breast that demonstrated fibroadenomas  OCPs: duration of 10 years in college  Menarche: around 7th grade,  estimated at 15 years old  Gestational history: 3 pregnancies, 2 live births at 30 years and 32 years, 1  at 20 years  Currently using Mirena IUD for significant bleeding from fibroids. Estimated LMP is 2023  No first degree relatives with breast or ovarian cancer.  She does have a family history of a maternal great aunt and maternal first cousin with breast cancer.     Menstrual history: Menarche age 15.  First pregnancy age 20.  Ended in .  Pregnancies age 30 and 32 with live births.  She has fibroids has a Mirena IUD with light periods.  She did have a history of heavy menstrual bleeding related to the fibroids, before the Mirena IUD was placed.  No history of hormone replacement therapy.     PMH:   Attention deficit disorder  Allergies  Migraines  Fibroids  The patient does not have a history of low bone density, diabetes mellitus, hyperlipidemia, or hypertension     She has no history of breast surgery, breast cancer in the past, radiation therapy in the past, radiation exposure in the past, tumor of any kind, heart problems, heart attack, breathing problems, blood clots, seizures, arthritis, peptic ulcer disease, osteoporosis, bone fractures.  She is not currently participating in a clinical trial.     PSH:  Intrauterine device insertion     ALLERGY:  Bee pollen  Walnuts  Doxycycline  Sulfa  Wasps       Dog hair  - She follows with Dr. Rufus Cardenas at [facility]     FAMILY HISTORY:  Mother with skin cancer on the leg at 65 years  Father with prostate cancer at 62 years old, currently alive, no recurrence [id number]. [place] 3+4.  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 The last noted menstrual cycle completed on January 22, 2023.      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 LEFT BREAST, SEED-LOCALIZED LUMPECTOMY:  - INVASIVE DUCTAL CARCINOMA, Grover grade 2, measuring 2.5 mm in greatest extent.  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  - Margins are negative for invasive carcinoma; closest uninvolved anterior margin is 6 mm.  - Margins are negative for DCIS; closest uninvolved inferior and medial margins are 1.5 mm.  - AJCC pathologic staging is pT1a pN0(sn).  - Biopsy marker and biopsy site changes present.  - Fibrocystic change and columnar cell change with associated calcifications.  - Invasive carcinoma is ER (60%, weak) and [place] (30%, moderate) positive, HER2 IHC is pending and the result will be reported in an addendum.     B. LEFT BREAST, NEW ANTERIOR MARGIN, EXCISION:  - Benign breast tissue with fibrocystic change, sclerosis adenosis and associated calcifications.  - Negative for atypia or malignancy.     C. LEFT BREAST, NEW LATERAL MARGIN, EXCISION:  - Benign breast tissue with fibrocystic change.  - Negative for atypia or malignancy.     D. LEFT BREAST, NEW MEDIAL MARGIN, EXCISION:  - Benign breast tissue with fibrocystic change, columnar cell change and  associated calcifications.  - Negative for atypia or malignancy.     E. SENTINEL LYMPH NODE, LEFT AXILLA, BIOPSY #1:  - One lymph node, negative for malignancy (0/1).     F. SENTINEL LYMPH NODE, LEFT AXILLA, BIOPSY #2:  - One lymph node, negative for malignancy (0/1).     TREATMENT HISTORY:  A.  Diagnosis with [id number] invasive ductal cancer of the left breast.  - Invasive carcinoma is ER (60%, weak) and WI (30%, moderate) positive,  HER2 negative 1+  B.  Mirana IUD removed.  C.  Lumpectomy WOODY quadrant.   D.  Bilateral mammoplasty breast reduction.  Follow up with Dr. Arnold.  E.  Radiation  Treatment Summary:  Radiation Oncology - Course: 1         Protocol:   Treatment Site            Current Dose   Modality           From    To        Elapsed Days  Fx.  1 Left Breast     4,240 cGy       06 X                  4/03/2023        4/28/2023        25       16                                                Dose per Fraction: 265 cGy   Total Dose:      4240 cGy  F.  Hormonal therapy with tamoxifen 5 mg per day begun .  She does not want regular dosing of 20 mg per day.  There are issues with fibroids and heavy periods. She did have some difficulty tolerating 5 mg per day.   G.    A. Breast, left, reduction mammoplasty:  --Benign breast tissue with nonproliferative fibrocystic change, 249.7 grams.  --Negative for carcinoma or atypical breast proliferative disease.  B. Breast, right, reduction mammoplasty:  --Benign breast tissue with fibrocystic change including usual ductal hyperplasia, 299.2 grams.  --Negative for carcinoma or atypical breast proliferative disease.      INTERVAL HISTORY:    Philippe and her  come to clinic for recommendations regarding imaging follow-up as well as implications of NF 1 germline mosaicism.  She continues on low-dose tamoxifen 5 mg/day and is tolerated quite well.  She does have significant joint stiffness with tamoxifen 5 mg/day.  She does have heavy periods and they are regular.   She affirmed that she did not want to go with a higher dose of tamoxifen because of concerns about side effects from this medication and we will stay at 5 mg/day.     REVIEW OF SYSTEMS: A 10 point review of systems was negative.     PHYSICAL EXAM:  VITALS:  /79 (BP Location: Right arm, Patient Position: Sitting, Cuff Size: Adult Regular)   Pulse 81   Resp 16   Wt 77.6 kg (171 lb)   SpO2 97%   BMI 28.77 kg/m    General: She appears generally well.  Oropharynx: Good dentition no lesions lymph:  No cervical supraclavicular subclavicular or axillary lymphadenopathy  Breast exam: Examination of the right breast reveals a mammoplasty incision at the 6 o'clock position and a circumareolar incision both of which are well-healed without erythema or masses.  In the right breast in the anterior axillary line at nipple level there is a 3 mm maculopapular lesion which the patient says is new over the last week or 2 and she thinks it is an insect bite.  Examination left breast reveals a mammoplasty incision at the 6 o'clock position and a circumareolar incision which is well-healed without erythema or masses.  In addition in the left breast at the 2 o'clock position there is a well-healed incision.  Slightly less prominent left nipple but everted less than right nipple.  The left axillary incisions well-healed without erythema or masses.  There are no masses in either breast.  There are postradiation pigmentation changes and pigmentation of the left breast.  Lungs: Clear to percussion auscultation  Heart: Regular rate and rhythm S1-S2.  Abdomen: Soft nontender without hepatosplenomegaly.  Extremities: Without edema.  Mood and affect were normal.     LABS:  CMP showed a low calcium of 7.7 otherwise normal.  CBC showed Hb 11.6, MCV 77 and RDW of 16 Absolute neutrophils of 3.8K     Imaging:  Breast MRI May 6, 2024    FINDINGS: Posttreatment changes of left breast conservation therapy.  No suspicious enhancement in either  breast. No lymphadenopathy.                                                                      IMPRESSION: BI-RADS CATEGORY: 2 - Benign.     RECOMMENDED FOLLOW-UP: Age and risk appropriate breast cancer  screening.      I have personally reviewed the examination and initial interpretation  and I agree with the findings.     JOSELINE CONROY MD     I reviewed the breast MRI with Dr. Deal and in the right breast corresponding skin lesion is noted.  About 3 mm in size.     ASSESSMENT AND PLAN:   1.  Philippe Tom is a 53 year old pre-menopausal woman with a recent diagnosis of a left breast  nuclear grade 3 DCIS with a 2.5 mm invasive component.  [id number] ER+ HER2-.  ECOG 0.   2.  She underwent left upper outer quadrant lumpectomy and then had bilateral breast reduction. Philippe is seen today approximately 4 months after the completion of radiation.  There are pigmentation changes and skin thickening of the left breast. Radiation to left breast.  Completed April 28.    3.  Adjuvant hormonal therapy. T1a invasive compoinent.  She has tolerated tamoxifen 5 mg/day with some hot flashes and disruption of sleep but otherwise has been tolerating it reasonably well. She has a history of fibroids and heavy menses.  I discussed with her if she wanted to increase the tamoxifen to the recommended 20 mg/day and she does not want to.  She thinks that it would be better to stay at the 5 mg/day dose with fewer side effects I think that there will be better compliance with tamoxifen 5 mg per day for 5 years and she agreed.   4.  Return to clinic in 1 month for breast exam with attention to the right breast maculopapular lesion.  5.  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  We will refer her to Dr. Laura Osei for recommendations.  I do think that intensive surveillance would be reasonable consisting of mammography and MRI each performed yearly and spaced 6 months apart. She understands risks of false  positive findings and does want this approach.  I would start with a mammogram 6 months after the completion of radiation which would be in November 2023.  She does have 2 children aged 20 and 22 and I discussed that it may be reasonable to screen them for NF1.  Another question is whether a skin biopsy would be useful for confirming mosaicism for NF1 mutation.  6.  If possible, do genetic testing on tumor tissue to confirm that the NF1 alteration is pathogenic.   7.  Fibroids.  Notably despite having fibroids her vaginal bleeding is not increased with tamoxifen is rather decreased.  Also it is reassuring that she does not have bleeding between periods.  Colonoscopy was performed at [place] [facility].  I asked her to forward the colonoscopy report to us.  8. Iron deficiency anemia.  May be related to fibroids. Iron could be prescribed for 3 months.   9.  Consideration of hysterectomy and oophorectomy. This may be an issue, though, with the fibroids and a CLEM-BSO may be a better option for her in the long term.  10.  Recommendations of exercise of 150 minutes/week based on the lace and pathway study.  11. Recommendation of a Mediterranean style diet low in saturated fat but not low in fat with more fruits and vegetables and less red meat.   12.  Recommendation for bone health includes vitamin D3 2000 international units daily and calcium 1 g/day either by diet or reading labels.  We also recommend bone strengthening exercises including stretch band hand weights and yoga.  13.  Migraines, associated with menses.    14.  Colonoscopy at [facility] in April. One polyp  5 years follow up. Sister had 3 polyps.   15.  Follow up.  Follow-up with me June 11 for repeat breast exam.  Follow up with Gillian August 7 with CBC, CMP.  Follow up with me November 12 with CBC, CMP,  mammogram.       Thank you for allowing us to participate in this patient's care.     Sincerely,      Marc Judd MD  Professor  [facility]  [place]  [phone]        I spent 40 minutes with the patient more than 50% of which was in counseling and coordination of care.

## 2024-06-09 NOTE — PROGRESS NOTES
MEDICAL ONCOLOGY FOLLOW UP NOTE     Philippe Tom  Female, 52 year old, 1970  MRN:   [id number]           Dear Dr. Diggs,     Thank you for referring Philippe Tom regarding a newly diagnosed DCIS with a microinvasive component.     PROBLEM: Diagnosis of left sided DCIS with invasive component     HISTORY OF PRESENT ILLNESS: Ms. Tom is a 53 year old woman from [place], [place] with a recent diagnosis of left breast Nuclear Grade 3 ER+(15-20%) DCIS with less than 1 mm of invasive disease as part of workup following routine screening mammogram that detected calcifications. She has not undergone surgical management as of yet, and is establishing care at the [facility] with Medical Oncology, and will be seeing Dr. Noman Diggs for surgical consultation on 1/27/2023.      Her oncologic diagnosis came to light after routine screening mammogram on 12/27/2022 demonstrated indeterminate grouped versus clustered fine microcalcifcations at the 2 o'clock position of the left breast at mid depth. Of note, she does have a history of previous breast biopsies in the right breast on 12/10/2015 that detected fibroadenomas at the 8 o'clock position, 5 cm from the nipple measuring 11 x 6 x 10 mm, and at the 10 o'clock position, 7 cm from the nipple measuring 8 x 4 x 7 mm. Within the 8 o'clock position, rare calcifications were noted adjacent to the fibroadenoma. She also had a diagnostic mammogram for a palpable lump in the left breast on 5/21/2019 at the 2 o'clock position. Focused left breast ultrasound demonstrated an anechoic cyst at the 2 o'clock psition, 8 cm from the nipple measuring 1.7 x 1.2 x 1.5 cm, noted to be benign. An additional anechoic cyst was noted at 1:30 position, 7 cm from the nipple measurign up to 8 mm and routine yearly mammography was recommended.     She underwent stereotactic guided biopsy [facility] on 1/10/2023 with pathology (Specimen -[id number])  demonstrating Nuclear Grade 3 ER+ (11-20%, weak) DCIS with central comedonecrosis, with less than 1 mm of invasive disease noted within the specimen.      She was seen by Dr. Kelly Garcia for surgical consultation on 2023 who discussed surgical options with the patient. Note unavailable in care everywhere    She saw Medical Genetics at [facility] on 2023, seeing Sasha Chacko. [facility]'s STAT breast panel was sent.      SUMMARY OF OUTSIDE STUDIES:  Patient had bilateral screening mammograms on 22 and indeterminate grouped versus clustered fine microcalcifications at the 2:00 position of the left breast, middle depth. Recommend correlation with a mediolateral view and spot magnification CC and MLO views of the left breast for further evaluation. Diagnostic imaging followed on  and clustered microcalcifications in the upper outer aspect of the left breast are suspicious of malignancy. Recommend stereotactically guided biopsy.      1/10/23 - Left breast, stereotactic needle core biopsy at 2:00:  Biopsy at [facility] - pathology report needed.   Left breast, needle core biopsy at 2:00: Ductal carcinoma in situ with   calcifications and at least microinvasion.    Patient states this was left invasive ductal carcinoma,  ER+    <1mm of invasive -- micro  23 - She did meet with Karley Garcia - consult note needed  Genetic counseling consult scheduled for  at [facility] - consult note needed.        Breast Cancer Risk Factors:   Breast biopsies: 2 breast biopsies on the right breast that demonstrated fibroadenomas  OCPs: duration of 10 years in college  Menarche: around 7th grade, estimated at 15 years old  Gestational history: 3 pregnancies, 2 live births at 30 years and 32 years, 1  at 20 years  Currently using Mirena IUD for significant bleeding from fibroids. Estimated LMP is 2023  No first degree relatives with breast or ovarian  cancer.  She does have a family history of a maternal great aunt and maternal first cousin with breast cancer.     Menstrual history: Menarche age 15.  First pregnancy age 20.  Ended in .  Pregnancies age 30 and 32 with live births.  She has fibroids has a Mirena IUD with light periods.  She did have a history of heavy menstrual bleeding related to the fibroids, before the Mirena IUD was placed.  No history of hormone replacement therapy.     PMH:   Attention deficit disorder  Allergies  Migraines  Fibroids  The patient does not have a history of low bone density, diabetes mellitus, hyperlipidemia, or hypertension     She has no history of breast surgery, breast cancer in the past, radiation therapy in the past, radiation exposure in the past, tumor of any kind, heart problems, heart attack, breathing problems, blood clots, seizures, arthritis, peptic ulcer disease, osteoporosis, bone fractures.  She is not currently participating in a clinical trial.     PSH:  Intrauterine device insertion     ALLERGY:  Bee pollen  Walnuts  Doxycycline  Sulfa  Wasps       Dog hair  - She follows with Dr. Rufus Cardenas at [facility]     FAMILY HISTORY:  Mother with skin cancer on the leg at 65 years  Father with prostate cancer at 62 years old, currently alive, no recurrence [id number]. Lilia 3+4.  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 The last noted menstrual cycle completed on January 22, 2023.      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 LEFT BREAST, SEED-LOCALIZED LUMPECTOMY:  - INVASIVE DUCTAL CARCINOMA, Grover grade 2, measuring 2.5 mm in greatest extent.  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  - Margins are negative for invasive carcinoma; closest uninvolved anterior margin is 6 mm.  - Margins are negative for DCIS; closest uninvolved inferior and medial margins are 1.5 mm.  - AJCC pathologic staging is pT1a pN0(sn).  - Biopsy marker and biopsy site changes present.  - Fibrocystic change and columnar cell change with associated calcifications.  - Invasive carcinoma is ER (60%, weak) and AR (30%, moderate) positive, HER2 IHC is pending and the result will be reported in an addendum.     B. LEFT BREAST, NEW ANTERIOR MARGIN, EXCISION:  - Benign breast tissue with fibrocystic change, sclerosis adenosis and associated calcifications.  - Negative for atypia or malignancy.     C. LEFT BREAST, NEW LATERAL MARGIN, EXCISION:  - Benign breast tissue with fibrocystic change.  - Negative for atypia or malignancy.     D. LEFT BREAST, NEW MEDIAL MARGIN, EXCISION:  - Benign breast tissue with fibrocystic change, columnar cell change and associated calcifications.  - Negative for atypia or malignancy.     E. SENTINEL LYMPH NODE, LEFT AXILLA, BIOPSY #1:  - One lymph node, negative for malignancy (0/1).     F. SENTINEL LYMPH NODE, LEFT AXILLA, BIOPSY #2:  - One lymph node, negative for malignancy (0/1).      TREATMENT HISTORY:  A.  Diagnosis with [id number] invasive ductal cancer of the left breast.  - Invasive carcinoma is ER (60%, weak) and TN (30%, moderate) positive,  HER2 negative 1+  B.  Mirana IUD removed.  C.  Lumpectomy WOODY quadrant.   D.  Bilateral mammoplasty breast reduction.  Follow up with Dr. Arnold.  E.  Radiation  Treatment Summary:  Radiation Oncology - Course: 1         Protocol:   Treatment Site            Current Dose   Modality           From    To        Elapsed Days  Fx.  1 Left Breast     4,240 cGy       06 X                  4/03/2023        4/28/2023        25       16                                                Dose per Fraction: 265 cGy   Total Dose:      4240 cGy  F.  Hormonal therapy with tamoxifen 5 mg per day begun 5-2-23.  She does not want regular dosing of 20 mg per day.  There are issues with fibroids and heavy periods. She did have some difficulty tolerating 5 mg per day.   G.    A. Breast, left, reduction mammoplasty:  --Benign breast tissue with nonproliferative fibrocystic change, 249.7 grams.  --Negative for carcinoma or atypical breast proliferative disease.  B. Breast, right, reduction mammoplasty:  --Benign breast tissue with fibrocystic change including usual ductal hyperplasia, 299.2 grams.  --Negative for carcinoma or atypical breast proliferative disease.        INTERVAL HISTORY:    Philippe  She affirmed that she did not want to go with a higher dose of tamoxifen because of concerns about side effects from this medication and we will stay at 5 mg/day.  She continues to do well.  She does have some discomfort in her hands with some stiffness related to the tamoxifen.  She also has some stiffness in her left shoulder.  She denies fatigue, depression, anxiety.  She reports that her periods are no longer heavy.  Her periods are regular and her last period was on May 19.     REVIEW OF SYSTEMS: A 10 point review of systems was negative.     PHYSICAL EXAM:  VITALS:  /83  (BP Location: Right arm, Patient Position: Sitting, Cuff Size: Adult Regular)   Pulse 96   Temp 97.9  F (36.6  C) (Oral)   Resp 16   Wt 76.5 kg (168 lb 9.6 oz)   SpO2 100%   BMI 28.36 kg/m    General: She appears generally well.  Oropharynx: Good dentition no lesions lymph:  No cervical supraclavicular subclavicular or axillary lymphadenopathy  Breast exam: There are no masses in the right breast.  There is a well-healed mammoplasty incision without erythema or masses.  Small skin nodule that was noted in the right anterior axillary line on the last visit is completely resolved and there are no skin nodules in either breast.  The left breast is without masses.  The left lumpectomy incision is well-healed without erythema or masses the left axillary incision is well-healed without erythema or masses.  There are no masses in either breast.  Lungs: Clear to percussion auscultation  Heart: Regular rate and rhythm S1-S2.  Abdomen: Soft nontender without hepatosplenomegaly.  Extremities: Without edema.  Mood and affect were normal.     LABS:  None today.       ASSESSMENT AND PLAN:   1.  Philippe Tom is a 53 year old pre-menopausal woman with a recent diagnosis of a left breast  nuclear grade 3 DCIS with a 2.5 mm invasive component.  [id number] ER+ HER2-.  ECOG 0.   2.  She underwent left upper outer quadrant lumpectomy and then had bilateral breast reduction. Philippe is seen today approximately 4 months after the completion of radiation.  There are pigmentation changes and skin thickening of the left breast. Radiation to left breast.  Completed April 28.    3.  Adjuvant hormonal therapy. T1a invasive compoinent.  She has tolerated tamoxifen 5 mg/day with some hot flashes and disruption of sleep but otherwise has been tolerating it reasonably well.  Although 20 mg of tamoxifen is sweats recommended she wants to stay with 5 mg/day because its better tolerated.    4.  Resolution of the right breast maculopapular  lesion.  5.  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  I have requested NF1 gene or CD in a sequencing of the left breast tumor to confirm that the germline finding was also in the tumor.  6.  If possible, do genetic testing on tumor tissue to confirm that the NF1 alteration is pathogenic.   7.  Fibroids.  She has less vaginal bleeding and none between periods.  This problem appears to be resolving.  8.  Colonoscopy was performed at [facility].  I asked her to forward the colonoscopy report to us.  9.  Consideration of hysterectomy and oophorectomy. This may be an issue, though, with the fibroids and a CLEM-BSO may be a better option for her in the long term.  10.  Recommendations of exercise of 150 minutes/week based on the lace and pathway study.  11. Recommendation of a Mediterranean style diet low in saturated fat but not low in fat with more fruits and vegetables and less red meat.   12.  Recommendation for bone health includes vitamin D3 2000 international units daily and calcium 1 g/day either by diet or reading labels.  We also recommend bone strengthening exercises including stretch band hand weights and yoga.  13.  Migraines, associated with menses.    14.  Colonoscopy at [facility] in April. One polyp  5 years follow up. Sister had 3 polyps.   15.  Follow up.  Follow-up with Gillian in August 7.   Return to see me November 12 with CBC, CMP, mammogram. Breast MRI May 7, 2025.       Thank you for allowing us to participate in this patient's care.     Sincerely,      Marc Judd MD  Professor  [facility]  [phone]        I spent 20 minutes with the patient more than 50% of which was in counseling and coordination of care.

## 2024-06-11 ENCOUNTER — ONCOLOGY VISIT (OUTPATIENT)
Dept: ONCOLOGY | Facility: CLINIC | Age: 54
End: 2024-06-11
Attending: INTERNAL MEDICINE
Payer: MEDICAID

## 2024-06-11 VITALS
TEMPERATURE: 97.9 F | RESPIRATION RATE: 16 BRPM | OXYGEN SATURATION: 100 % | SYSTOLIC BLOOD PRESSURE: 138 MMHG | HEART RATE: 96 BPM | DIASTOLIC BLOOD PRESSURE: 83 MMHG | BODY MASS INDEX: 28.36 KG/M2 | WEIGHT: 168.6 LBS

## 2024-06-11 DIAGNOSIS — C50.412 MALIGNANT NEOPLASM OF UPPER-OUTER QUADRANT OF LEFT BREAST IN FEMALE, ESTROGEN RECEPTOR POSITIVE (H): Primary | ICD-10-CM

## 2024-06-11 DIAGNOSIS — Z17.0 MALIGNANT NEOPLASM OF UPPER-OUTER QUADRANT OF LEFT BREAST IN FEMALE, ESTROGEN RECEPTOR POSITIVE (H): Primary | ICD-10-CM

## 2024-06-11 PROCEDURE — 99214 OFFICE O/P EST MOD 30 MIN: CPT | Performed by: INTERNAL MEDICINE

## 2024-06-11 PROCEDURE — G0463 HOSPITAL OUTPT CLINIC VISIT: HCPCS | Performed by: INTERNAL MEDICINE

## 2024-06-11 ASSESSMENT — PAIN SCALES - GENERAL: PAINLEVEL: MILD PAIN (2)

## 2024-06-11 NOTE — LETTER
6/11/2024      Philippe Medellin  [address]  [facility] [id number]      Dear Colleague,    Thank you for referring your patient, Philippe Medellin, to the [facility]. Please see a copy of my visit note below.    MEDICAL ONCOLOGY FOLLOW UP NOTE     Philippe Tom  Female, 52 year old, 1970  MRN:   [id number]           Dear Dr. Diggs,     Thank you for referring Philippe Tom regarding a newly diagnosed DCIS with a microinvasive component.     PROBLEM: Diagnosis of left sided DCIS with invasive component     HISTORY OF PRESENT ILLNESS: Ms. Tom is a 53 year old woman from [place], [place] with a recent diagnosis of left breast Nuclear Grade 3 ER+(15-20%) DCIS with less than 1 mm of invasive disease as part of workup following routine screening mammogram that detected calcifications. She has not undergone surgical management as of yet, and is establishing care at the [facility] with Medical Oncology, and will be seeing Dr. Noman Diggs for surgical consultation on 1/27/2023.      Her oncologic diagnosis came to light after routine screening mammogram on 12/27/2022 demonstrated indeterminate grouped versus clustered fine microcalcifcations at the 2 o'clock position of the left breast at mid depth. Of note, she does have a history of previous breast biopsies in the right breast on 12/10/2015 that detected fibroadenomas at the 8 o'clock position, 5 cm from the nipple measuring 11 x 6 x 10 mm, and at the 10 o'clock position, 7 cm from the nipple measuring 8 x 4 x 7 mm. Within the 8 o'clock position, rare calcifications were noted adjacent to the fibroadenoma. She also had a diagnostic mammogram for a palpable lump in the left breast on 5/21/2019 at the 2 o'clock position. Focused left breast ultrasound demonstrated an anechoic cyst at the 2 o'clock psition, 8 cm from the nipple measuring 1.7 x 1.2 x 1.5 cm, noted to be benign. An additional anechoic  cyst was noted at 1:30 position, 7 cm from the nipple measurign up to 8 mm and routine yearly mammography was recommended.     She underwent stereotactic guided biopsy at [facility] on 1/10/2023 with pathology (Specimen -[id number]) demonstrating Nuclear Grade 3 ER+ (11-20%, weak) DCIS with central comedonecrosis, with less than 1 mm of invasive disease noted within the specimen.      She was seen by Dr. Kelly Garcia for surgical consultation on 1/19/2023 who discussed surgical options with the patient. Note unavailable in care everywhere    She saw Medical Genetics at [facility] on 1/25/2023, seeing Sasha Chacko. [facility]'s STAT breast panel was sent.      SUMMARY OF OUTSIDE STUDIES:  Patient had bilateral screening mammograms on 12/27/22 and indeterminate grouped versus clustered fine microcalcifications at the 2:00 position of the left breast, middle depth. Recommend correlation with a mediolateral view and spot magnification CC and MLO views of the left breast for further evaluation. Diagnostic imaging followed on 12/30 and clustered microcalcifications in the upper outer aspect of the left breast are suspicious of malignancy. Recommend stereotactically guided biopsy.      1/10/23 - Left breast, stereotactic needle core biopsy at 2:00:  Biopsy at [facility] - pathology report needed.   Left breast, needle core biopsy at 2:00: Ductal carcinoma in situ with   calcifications and at least microinvasion.    Patient states this was left invasive ductal carcinoma,  ER+    <1mm of invasive -- micro  1/19/23 - She did meet with Karley Garcia - consult note needed  Genetic counseling consult scheduled for 1/25 at [facility] - consult note needed.        Breast Cancer Risk Factors:   Breast biopsies: 2 breast biopsies on the right breast that demonstrated fibroadenomas  OCPs: duration of 10 years in college  Menarche: around 7th grade, estimated at 15  years old  Gestational history: 3 pregnancies, 2 live births at 30 years and 32 years, 1  at 20 years  Currently using Mirena IUD for significant bleeding from fibroids. Estimated LMP is 2023  No first degree relatives with breast or ovarian cancer.  She does have a family history of a maternal great aunt and maternal first cousin with breast cancer.     Menstrual history: Menarche age 15.  First pregnancy age 20.  Ended in .  Pregnancies age 30 and 32 with live births.  She has fibroids has a Mirena IUD with light periods.  She did have a history of heavy menstrual bleeding related to the fibroids, before the Mirena IUD was placed.  No history of hormone replacement therapy.     PMH:   Attention deficit disorder  Allergies  Migraines  Fibroids  The patient does not have a history of low bone density, diabetes mellitus, hyperlipidemia, or hypertension     She has no history of breast surgery, breast cancer in the past, radiation therapy in the past, radiation exposure in the past, tumor of any kind, heart problems, heart attack, breathing problems, blood clots, seizures, arthritis, peptic ulcer disease, osteoporosis, bone fractures.  She is not currently participating in a clinical trial.     PSH:  Intrauterine device insertion     ALLERGY:  Bee pollen  Walnuts  Doxycycline  Sulfa  Wasps       Dog hair  - She follows with Dr. Rufus Cardenas at [facility]     FAMILY HISTORY:  Mother with skin cancer on the leg at 65 years  Father with prostate cancer at 62 years old, currently alive, no recurrence [id number]. [place] 3+4.  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 The last noted menstrual cycle completed on January 22, 2023.      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 LEFT BREAST, SEED-LOCALIZED LUMPECTOMY:  - INVASIVE DUCTAL CARCINOMA, [place] grade 2, measuring 2.5 mm in greatest extent.  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  - Margins are negative for invasive carcinoma; closest uninvolved anterior margin is 6 mm.  - Margins are negative for DCIS; closest uninvolved inferior and medial margins are 1.5 mm.  - AJCC pathologic staging is pT1a pN0(sn).  - Biopsy marker and biopsy site changes present.  - Fibrocystic change and columnar cell change with associated calcifications.  - Invasive carcinoma is ER (60%, weak) and PA (30%, moderate) positive, HER2 IHC is pending and the result will be reported in an addendum.     B. LEFT BREAST, NEW ANTERIOR MARGIN, EXCISION:  - Benign breast tissue with fibrocystic change, sclerosis adenosis and associated calcifications.  - Negative for atypia or malignancy.     C. LEFT BREAST, NEW LATERAL MARGIN, EXCISION:  - Benign breast tissue with fibrocystic change.  - Negative for atypia or malignancy.     D. LEFT BREAST, NEW MEDIAL MARGIN, EXCISION:  - Benign breast tissue with fibrocystic change, columnar cell change and associated  calcifications.  - Negative for atypia or malignancy.     E. SENTINEL LYMPH NODE, LEFT AXILLA, BIOPSY #1:  - One lymph node, negative for malignancy (0/1).     F. SENTINEL LYMPH NODE, LEFT AXILLA, BIOPSY #2:  - One lymph node, negative for malignancy (0/1).     TREATMENT HISTORY:  A.  Diagnosis with [id number] invasive ductal cancer of the left breast.  - Invasive carcinoma is ER (60%, weak) and RI (30%, moderate) positive,  HER2 negative 1+  B.  Mirana IUD removed.  C.  Lumpectomy WOODY quadrant.   D.  Bilateral mammoplasty breast reduction.  Follow up with Dr. Arnold.  E.  Radiation  Treatment Summary:  Radiation Oncology - Course: 1         Protocol:   Treatment Site            Current Dose   Modality           From    To        Elapsed Days  Fx.  1 Left Breast     4,240 cGy       06 X                  4/03/2023        4/28/2023        25       16                                                Dose per Fraction: 265 cGy   Total Dose:      4240 cGy  F.  Hormonal therapy with tamoxifen 5 mg per day begun 5-2-23.  She does not want regular dosing of 20 mg per day.  There are issues with fibroids and heavy periods. She did have some difficulty tolerating 5 mg per day.   G.    A. Breast, left, reduction mammoplasty:  --Benign breast tissue with nonproliferative fibrocystic change, 249.7 grams.  --Negative for carcinoma or atypical breast proliferative disease.  B. Breast, right, reduction mammoplasty:  --Benign breast tissue with fibrocystic change including usual ductal hyperplasia, 299.2 grams.  --Negative for carcinoma or atypical breast proliferative disease.        INTERVAL HISTORY:    Philippe  She affirmed that she did not want to go with a higher dose of tamoxifen because of concerns about side effects from this medication and we will stay at 5 mg/day.  She continues to do well.  She does have some discomfort in her hands with some stiffness related to the tamoxifen.  She also has some stiffness in her left  shoulder.  She denies fatigue, depression, anxiety.  She reports that her periods are no longer heavy.  Her periods are regular and her last period was on May 19.     REVIEW OF SYSTEMS: A 10 point review of systems was negative.     PHYSICAL EXAM:  VITALS:  /83 (BP Location: Right arm, Patient Position: Sitting, Cuff Size: Adult Regular)   Pulse 96   Temp 97.9  F (36.6  C) (Oral)   Resp 16   Wt 76.5 kg (168 lb 9.6 oz)   SpO2 100%   BMI 28.36 kg/m    General: She appears generally well.  Oropharynx: Good dentition no lesions lymph:  No cervical supraclavicular subclavicular or axillary lymphadenopathy  Breast exam: There are no masses in the right breast.  There is a well-healed mammoplasty incision without erythema or masses.  Small skin nodule that was noted in the right anterior axillary line on the last visit is completely resolved and there are no skin nodules in either breast.  The left breast is without masses.  The left lumpectomy incision is well-healed without erythema or masses the left axillary incision is well-healed without erythema or masses.  There are no masses in either breast.  Lungs: Clear to percussion auscultation  Heart: Regular rate and rhythm S1-S2.  Abdomen: Soft nontender without hepatosplenomegaly.  Extremities: Without edema.  Mood and affect were normal.     LABS:  None today.       ASSESSMENT AND PLAN:   1.  Philippe Tom is a 53 year old pre-menopausal woman with a recent diagnosis of a left breast  nuclear grade 3 DCIS with a 2.5 mm invasive component.  [id number] ER+ HER2-.  ECOG 0.   2.  She underwent left upper outer quadrant lumpectomy and then had bilateral breast reduction. Philippe is seen today approximately 4 months after the completion of radiation.  There are pigmentation changes and skin thickening of the left breast. Radiation to left breast.  Completed April 28.    3.  Adjuvant hormonal therapy. T1a invasive compoinent.  She has tolerated tamoxifen 5 mg/day with  some hot flashes and disruption of sleep but otherwise has been tolerating it reasonably well.  Although 20 mg of tamoxifen is sweats recommended she wants to stay with 5 mg/day because its better tolerated.    4.  Resolution of the right breast maculopapular lesion.  5.  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  I have requested NF1 gene or CD in a sequencing of the left breast tumor to confirm that the germline finding was also in the tumor.  6.  If possible, do genetic testing on tumor tissue to confirm that the NF1 alteration is pathogenic.   7.  Fibroids.  She has less vaginal bleeding and none between periods.  This problem appears to be resolving.  8.  Colonoscopy was performed at [facility].  I asked her to forward the colonoscopy report to us.  9.  Consideration of hysterectomy and oophorectomy. This may be an issue, though, with the fibroids and a CLEM-BSO may be a better option for her in the long term.  10.  Recommendations of exercise of 150 minutes/week based on the lace and pathway study.  11. Recommendation of a Mediterranean style diet low in saturated fat but not low in fat with more fruits and vegetables and less red meat.   12.  Recommendation for bone health includes vitamin D3 2000 international units daily and calcium 1 g/day either by diet or reading labels.  We also recommend bone strengthening exercises including stretch band hand weights and yoga.  13.  Migraines, associated with menses.    14.  Colonoscopy at [facility] in April. One polyp  5 years follow up. Sister had 3 polyps.   15.  Follow up.  Follow-up with Gillian in August 7.   Return to see me November 12 with CBC, CMP, mammogram. Breast MRI May 7, 2025.       Thank you for allowing us to participate in this patient's care.     Sincerely,      Marc Judd MD  Professor  [facility]  [phone]        I spent 20 minutes with the patient more than 50% of which was in counseling and  coordination of care.

## 2024-06-11 NOTE — NURSING NOTE
"Oncology Rooming Note    June 11, 2024 9:26 AM   Philippe Medellin is a 53 year old female who presents for:    Chief Complaint   Patient presents with    Oncology Clinic Visit     Malignant neoplasm of upper-outer quadrant of left breast in female, estrogen receptor positive     Initial Vitals: /83 (BP Location: Right arm, Patient Position: Sitting, Cuff Size: Adult Regular)   Pulse 96   Temp 97.9  F (36.6  C) (Oral)   Resp 16   Wt 76.5 kg (168 lb 9.6 oz)   SpO2 100%   BMI 28.36 kg/m   Estimated body mass index is 28.36 kg/m  as calculated from the following:    Height as of 2/14/24: 1.642 m (5' 4.65\").    Weight as of this encounter: 76.5 kg (168 lb 9.6 oz). Body surface area is 1.87 meters squared.  Mild Pain (2) Comment: Data Unavailable   No LMP recorded.  Allergies reviewed: Yes  Medications reviewed: Yes    Medications: Medication refills not needed today.  Pharmacy name entered into EPIC:    [facility] [place] [place]  [id number] [place], [place] - [address]    Frailty Screening:   Is the patient here for a new oncology consult visit in cancer care? 2. No      Clinical concerns: Right shoulder, wrist pain, limited mobility.      Milagros Martinez, EMT  6/11/2024              "

## 2024-08-07 ENCOUNTER — APPOINTMENT (OUTPATIENT)
Dept: LAB | Facility: CLINIC | Age: 54
End: 2024-08-07
Attending: NURSE PRACTITIONER
Payer: MEDICAID

## 2024-08-07 ENCOUNTER — ONCOLOGY VISIT (OUTPATIENT)
Dept: ONCOLOGY | Facility: CLINIC | Age: 54
End: 2024-08-07
Attending: NURSE PRACTITIONER
Payer: MEDICAID

## 2024-08-07 VITALS
TEMPERATURE: 97.9 F | WEIGHT: 167.7 LBS | BODY MASS INDEX: 28.21 KG/M2 | RESPIRATION RATE: 16 BRPM | OXYGEN SATURATION: 99 % | SYSTOLIC BLOOD PRESSURE: 124 MMHG | DIASTOLIC BLOOD PRESSURE: 81 MMHG | HEART RATE: 88 BPM

## 2024-08-07 DIAGNOSIS — Z17.0 MALIGNANT NEOPLASM OF UPPER-OUTER QUADRANT OF LEFT BREAST IN FEMALE, ESTROGEN RECEPTOR POSITIVE (H): ICD-10-CM

## 2024-08-07 DIAGNOSIS — C50.412 MALIGNANT NEOPLASM OF UPPER-OUTER QUADRANT OF LEFT BREAST IN FEMALE, ESTROGEN RECEPTOR POSITIVE (H): ICD-10-CM

## 2024-08-07 LAB
ALBUMIN SERPL BCG-MCNC: 3.9 G/DL (ref 3.5–5.2)
ALP SERPL-CCNC: 46 U/L (ref 40–150)
ALT SERPL W P-5'-P-CCNC: 11 U/L (ref 0–50)
ANION GAP SERPL CALCULATED.3IONS-SCNC: 9 MMOL/L (ref 7–15)
AST SERPL W P-5'-P-CCNC: 19 U/L (ref 0–45)
BASOPHILS # BLD AUTO: 0 10E3/UL (ref 0–0.2)
BASOPHILS NFR BLD AUTO: 0 %
BILIRUB SERPL-MCNC: 0.3 MG/DL
BUN SERPL-MCNC: 14.5 MG/DL (ref 6–20)
CALCIUM SERPL-MCNC: 8.8 MG/DL (ref 8.8–10.4)
CEA SERPL-MCNC: 1 NG/ML
CHLORIDE SERPL-SCNC: 106 MMOL/L (ref 98–107)
CREAT SERPL-MCNC: 0.62 MG/DL (ref 0.51–0.95)
EGFRCR SERPLBLD CKD-EPI 2021: >90 ML/MIN/1.73M2
EOSINOPHIL # BLD AUTO: 0.1 10E3/UL (ref 0–0.7)
EOSINOPHIL NFR BLD AUTO: 2 %
ERYTHROCYTE [DISTWIDTH] IN BLOOD BY AUTOMATED COUNT: 13.7 % (ref 10–15)
GLUCOSE SERPL-MCNC: 71 MG/DL (ref 70–99)
HCO3 SERPL-SCNC: 27 MMOL/L (ref 22–29)
HCT VFR BLD AUTO: 41.4 % (ref 35–47)
HGB BLD-MCNC: 13.1 G/DL (ref 11.7–15.7)
IMM GRANULOCYTES # BLD: 0 10E3/UL
IMM GRANULOCYTES NFR BLD: 0 %
LYMPHOCYTES # BLD AUTO: 1.1 10E3/UL (ref 0.8–5.3)
LYMPHOCYTES NFR BLD AUTO: 23 %
MCH RBC QN AUTO: 27.5 PG (ref 26.5–33)
MCHC RBC AUTO-ENTMCNC: 31.6 G/DL (ref 31.5–36.5)
MCV RBC AUTO: 87 FL (ref 78–100)
MONOCYTES # BLD AUTO: 0.6 10E3/UL (ref 0–1.3)
MONOCYTES NFR BLD AUTO: 11 %
NEUTROPHILS # BLD AUTO: 3 10E3/UL (ref 1.6–8.3)
NEUTROPHILS NFR BLD AUTO: 64 %
NRBC # BLD AUTO: 0 10E3/UL
NRBC BLD AUTO-RTO: 0 /100
PLATELET # BLD AUTO: 216 10E3/UL (ref 150–450)
POTASSIUM SERPL-SCNC: 3.5 MMOL/L (ref 3.4–5.3)
PROT SERPL-MCNC: 6.8 G/DL (ref 6.4–8.3)
RBC # BLD AUTO: 4.77 10E6/UL (ref 3.8–5.2)
SODIUM SERPL-SCNC: 142 MMOL/L (ref 135–145)
WBC # BLD AUTO: 4.9 10E3/UL (ref 4–11)

## 2024-08-07 PROCEDURE — 99215 OFFICE O/P EST HI 40 MIN: CPT | Performed by: NURSE PRACTITIONER

## 2024-08-07 PROCEDURE — 36415 COLL VENOUS BLD VENIPUNCTURE: CPT | Performed by: NURSE PRACTITIONER

## 2024-08-07 PROCEDURE — 85025 COMPLETE CBC W/AUTO DIFF WBC: CPT | Performed by: NURSE PRACTITIONER

## 2024-08-07 PROCEDURE — G0463 HOSPITAL OUTPT CLINIC VISIT: HCPCS | Performed by: NURSE PRACTITIONER

## 2024-08-07 PROCEDURE — 80053 COMPREHEN METABOLIC PANEL: CPT | Performed by: NURSE PRACTITIONER

## 2024-08-07 PROCEDURE — 82378 CARCINOEMBRYONIC ANTIGEN: CPT | Performed by: NURSE PRACTITIONER

## 2024-08-07 ASSESSMENT — PAIN SCALES - GENERAL: PAINLEVEL: NO PAIN (0)

## 2024-08-07 NOTE — PROGRESS NOTES
MEDICAL ONCOLOGY FOLLOW UP NOTE    PROBLEM: New diagnosis of left sided DCIS with invasive component     HISTORY OF PRESENT ILLNESS: Ms. Tom is a 52 year old woman from [place], [place] with a diagnosis of left breast Nuclear Grade 3 ER+(15-20%) DCIS with less than 1 mm of invasive disease as part of workup following routine screening mammogram that detected calcifications.      Per prior notes, she had a routine screening mammogram on 12/27/2022 which demonstrated indeterminate grouped versus clustered fine microcalcifcations at the 2 o'clock position of the left breast at mid depth. Of note, she does have a history of previous breast biopsies in the right breast on 12/10/2015 that detected fibroadenomas at the 8 o'clock position, 5 cm from the nipple measuring 11 x 6 x 10 mm, and at the 10 o'clock position, 7 cm from the nipple measuring 8 x 4 x 7 mm. Within the 8 o'clock position, rare calcifications were noted adjacent to the fibroadenoma. She also had a diagnostic mammogram for a palpable lump in the left breast on 5/21/2019 at the 2 o'clock position. Focused left breast ultrasound demonstrated an anechoic cyst at the 2 o'clock psition, 8 cm from the nipple measuring 1.7 x 1.2 x 1.5 cm, noted to be benign. An additional anechoic cyst was noted at 1:30 position, 7 cm from the nipple measurign up to 8 mm and routine yearly mammography was recommended.     She underwent stereotactic guided biopsy at [facility] on 1/10/2023 with pathology (Specimen -[id number]) demonstrating Nuclear Grade 3 ER+ (11-20%, weak) DCIS with central comedonecrosis, with less than 1 mm of invasive disease noted within the specimen.     On 2/15/2023 she underwent seed-localized lumpectomy and SLN biopsy by Dr. Diggs, with pathology showing IDC grade 2, 2.5mm in greatest extent with associated DCIS, intermediate grade measuring up to 2.0 cm.    On 2/24/2023 she had bilateral reduction mammoplasty by Dr. Arnold,  with pathology negative for atypia or malignancy.     Breast Cancer Risk Factors:   Breast biopsies: 2 breast biopsies on the right breast that demonstrated fibroadenomas  OCPs: duration of 10 years in college  Menarche: around 7th grade, estimated at 15 years old  Gestational history: 3 pregnancies, 2 live births at 30 years and 32 years, 1  at 20 years  Currently using Mirena IUD for significant bleeding from fibroids. Estimated LMP is 2023  No first degree relatives with breast or ovarian cancer.  She does have a family history of a maternal great aunt and maternal first cousin with breast cancer.     Menstrual history: Menarche age 15.  First pregnancy age 20.  Ended in .  Pregnancies age 30 and 32 with live births.  She has fibroids has a Mirena IUD with light periods.  She did have a history of heavy menstrual bleeding related to the fibroids, before the Mirena IUD was placed.  No history of hormone replacement therapy.     PMH:   Attention deficit disorder  Allergies  Migraines  Fibroids  The patient does not have a history of low bone density, diabetes mellitus, hyperlipidemia, or hypertension     She has no history of breast surgery, breast cancer in the past, radiation therapy in the past, radiation exposure in the past, tumor of any kind, heart problems, heart attack, breathing problems, blood clots, seizures, arthritis, peptic ulcer disease, osteoporosis, bone fractures.  She is not currently participating in a clinical trial.     PSH:  Intrauterine device insertion     MEDICATIONS:  Cetirizine  Cyclobenzaprine  Fremanezumab-vfrm (Ajovy)  Methylphenidate  Sumatriptan  Tretinoin  Adderall  Rimegepant  Epinephrine 0.3 mg/0.3 mL     ALLERGY:  Bee pollen  Walnuts  Doxycycline  Sulfa  Wasps        Dog hair  - She follows with Dr. Rufus Cardenas at [facility]     FAMILY HISTORY:  Mother with skin cancer on the leg at 65 years  Father with prostate cancer at 62 years  old, currently alive, no recurrence [id number]. Lilia 3+4.  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; unfortunately let go and currently looking for employment.   She has two adult children, 20 and 22 years old  Occasional alcohol use  Never smoker  Does not use any other illicits     ECOG PERFORMANCE STATUS: 0     HISTORY OF RADIATION: No  IMPLANTED CARDIAC DEVICE: No  PREGNANCY RISK: She does currently still have regular menstrual periods, but is using a Mirena IUD and has light periods. The last noted menstrual cycle completed on January 22, 2023.      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 LEFT BREAST, SEED-LOCALIZED LUMPECTOMY:  - INVASIVE DUCTAL CARCINOMA, [place] grade 2, measuring 2.5 mm in greatest extent.  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  - Margins are negative for invasive carcinoma; closest uninvolved anterior margin is 6 mm.  - Margins are negative for DCIS; closest uninvolved inferior and medial margins are 1.5 mm.  - AJCC pathologic staging is pT1a pN0(sn).  - Biopsy marker and biopsy site changes present.  - Fibrocystic change and columnar cell change with associated calcifications.  - Invasive carcinoma is ER (60%, weak) and [place] (30%, moderate) positive, HER2 IHC is pending and the result will be reported in an  addendum.     B. LEFT BREAST, NEW ANTERIOR MARGIN, EXCISION:  - Benign breast tissue with fibrocystic change, sclerosis adenosis and associated calcifications.  - Negative for atypia or malignancy.     C. LEFT BREAST, NEW LATERAL MARGIN, EXCISION:  - Benign breast tissue with fibrocystic change.  - Negative for atypia or malignancy.     D. LEFT BREAST, NEW MEDIAL MARGIN, EXCISION:  - Benign breast tissue with fibrocystic change, columnar cell change and associated calcifications.  - Negative for atypia or malignancy.     E. SENTINEL LYMPH NODE, LEFT AXILLA, BIOPSY #1:  - One lymph node, negative for malignancy (0/1).     F. SENTINEL LYMPH NODE, LEFT AXILLA, BIOPSY #2:  - One lymph node, negative for malignancy (0/1).     TREATMENT HISTORY:  A. Diagnosis with [id number] invasive ductal cancer of the left breast.  - Invasive carcinoma is ER (60%, weak) and [place] (30%, moderate) positive,  HER2 negative 1+  B. Mirana IUD removed.  C. Lumpectomy  D. Bilateral mammoplasty breast reduction.  Follow up with Dr. Arnold.  E. Radiation  Treatment Summary:  Radiation Oncology - Course: 1         Protocol:   Treatment Site            Current Dose   Modality           From    To        Elapsed Days  Fx.  1 Left Breast     4,240 cGy       06 X      4/03/2023        4/28/2023        25       16                                                Dose per Fraction: 265 cGy   Total Dose:      4240 cGy  F. Hormonal therapy with tamoxifen planned.         COVID-19 vaccination  - She has had her last (4th) booster in November 2022.      INTERVAL HISTORY:    She is here today unaccompanied and feeling well overall.  -Did have one episode of oral thrush - likely related to her /inhaler.  She has been consistently cleaning/rinsing after use.  -Still having periods but not as heavy.  -Daughter was tested for NF1 mutation and was negative.  Her son has not yet as he was out of the country.   -Tennis elbow likely from pickle ball on the  right.   -Still some other joint pains in hip, wrist, hands but she thinks better overall.  -Tamoxifen 5 mg still and tolerating this well.  -Denies hot flashes.  -Some issues with her asthma and her allergist has recommended a chest x-ray which she had and PCP follow-up.  She needs to establish with a PC and referral was placed at our last visit.      REVIEW OF SYSTEMS: A 10 point review of systems was negative.    PHYSICAL EXAM:  /62 (BP Location: Right arm, Patient Position: Sitting, Cuff Size: Adult Regular)   Pulse 79   Temp 97.9  F (36.6  C) (Oral)   Resp 16   Wt 77.9 kg (171 lb 11.2 oz)   SpO2 98%   BMI 28.75 kg/m    General: She appears generally well.  Lymph: No cervical supraclavicular subclavicular or axillary lymphadenopathy.  Lungs: Clear to auscultation bilaterally.  Heart: Regular rate and rhythm S1-S2.  Abdomen: Soft nontender without hepatosplenomegaly.  Extremities: Without edema.  Mood and affect were normal.  Breast: Post-left lumpectomy and bilateral reduction mammoplasty.  Incisions well-healed with no underlying nodularity.  No overlying skin changes or erythema.  No masses noted.    LABS::  Most Recent 3 CBC's:  Recent Labs   Lab Test 08/07/24  1020 05/06/24  1127 02/15/24  1221   WBC 4.9 4.3 5.9   HGB 13.1 12.5 12.3   MCV 87 86 87    206 223   ANEUTAUTO 3.0 2.6 4.1     Most Recent 3 BMP's:  Recent Labs   Lab Test 08/07/24  1020 05/06/24  1127 02/15/24  1221    142 140   POTASSIUM 3.5 3.5 3.6   CHLORIDE 106 111* 105   CO2 27 23 27   BUN 14.5 12.9 12.5   CR 0.62 0.57 0.58   ANIONGAP 9 8 8   NATHAANEL 8.8 7.7* 8.7   GLC 71 89 95   PROTTOTAL 6.8 6.1* 6.6   ALBUMIN 3.9 3.5 3.8    Most Recent 3 LFT's:  Recent Labs   Lab Test 08/07/24  1020 05/06/24  1127 02/15/24  1221   AST 19 15 19   ALT 11 10 19   ALKPHOS 46 39* 45   BILITOTAL 0.3 <0.2 <0.2     I reviewed the above labs today.    IMAGING:  No new imaging to review today.   I do review the MRI report from May prior to the  visit:    FINDINGS: Posttreatment changes of left breast conservation therapy.  No suspicious enhancement in either breast. No lymphadenopathy.                                                                      IMPRESSION: BI-RADS CATEGORY: 2 - Benign.     RECOMMENDED FOLLOW-UP: Age and risk appropriate breast cancer  screening.      I have personally reviewed the examination and initial interpretation  and I agree with the findings.     JOSELINE CONROY MD     OTHER DATA:   Oncology notes reviewed today prior to visit.     ASSESSMENT AND PLAN:   Philippe Tom is a 52 year old pre-menopausal woman with a recent diagnosis of a left breast  nuclear grade 3 DCIS with a microinvasive component 2.5mm  -Post-left lumpectomy, bilateral mammoplasty reduction, and radiation.  -She continues on tamoxifen 5mg and is tolerating this well overall.   -Her periods have been monthly, and her flow is much less.  -She will having imaging every 6 months alternating MRI with mammogram, MRI done in May and mammogram scheduled for November.     Bone Health:  -150 minutes/week based on the lace and pathway study.  -VItaming D3 and Calcium.    NF1 gene:  -She has met with the specialist (Feb 2024).  -MRI brain done Feb 2024 with no concerning findings.  -Message to specialist per patient request regarding further testing on her tumor tissue.   -She was advised to call immediately if she feels any new lumps, bumps or pain in the area where she received radiation.  I do review this with her again today, and she is encouraged to call clinic with any concerns.  She states understanding of and agreement with this plan.    Follow up:  As scheduled with Dr. Judd in November 2024 with labs, mammogram.  She will call sooner with any concerns.      Gillian Quiroz, JOVANI, CNP  [facility]  [address] [place] [address] [phone]    48 minutes spent on the date of the encounter doing chart review, review of  outside records, review of test results, interpretation of tests, patient visit, and documentation and communication with team/NF specialist via message.

## 2024-08-07 NOTE — NURSING NOTE
"Oncology Rooming Note    August 7, 2024 10:35 AM   Philippe Medellin is a 53 year old female who presents for:    Chief Complaint   Patient presents with    Blood Draw     Labs drawn via  by RN. VS taken.    Oncology Clinic Visit     Malignant neoplasm of upper-outer quadrant of left breast        Initial Vitals: /81 (BP Location: Right arm, Patient Position: Sitting, Cuff Size: Adult Regular)   Pulse 88   Temp 97.9  F (36.6  C) (Oral)   Resp 16   Wt 76.1 kg (167 lb 11.2 oz)   SpO2 99%   BMI 28.21 kg/m   Estimated body mass index is 28.21 kg/m  as calculated from the following:    Height as of 2/14/24: 1.642 m (5' 4.65\").    Weight as of this encounter: 76.1 kg (167 lb 11.2 oz). Body surface area is 1.86 meters squared.  No Pain (0) Comment: Data Unavailable   No LMP recorded.  Allergies reviewed: Yes  Medications reviewed: Yes    Medications: MEDICATION REFILLS NEEDED TODAY. Provider was NOT notified.  Pharmacy name entered into EPIC:    [facility] [place] [place]  [id number] [place], [place] - [address]    Frailty Screening:   Is the patient here for a new oncology consult visit in cancer care? 2. No      Clinical concerns: Pt needs refills of the   fluconazole (DIFLUCAN) 150 MG tablet   lamoxifen (NOLVADEX) 10 MG tablet     Pt received a letter following their last appointment with Trell Nuñez that recommended some next steps and Pt would like to discuss on how to proceed. The letter recommended genetic testing on tissue.     Pt has been getting some minor joint pain that has occurred in the last few months, and they stated that they previously reported it.    Soledad Osorio            "

## 2024-08-07 NOTE — LETTER
8/7/2024      Pihlippe Medellin  [address]  [facility] [address]      Dear Colleague,    Thank you for referring your patient, Philippe Medellin, to the [facility]. Please see a copy of my visit note below.    MEDICAL ONCOLOGY FOLLOW UP NOTE    PROBLEM: New diagnosis of left sided DCIS with invasive component     HISTORY OF PRESENT ILLNESS: Ms. Tom is a 52 year old woman from [place], [place] with a diagnosis of left breast Nuclear Grade 3 ER+(15-20%) DCIS with less than 1 mm of invasive disease as part of workup following routine screening mammogram that detected calcifications.      Per prior notes, she had a routine screening mammogram on 12/27/2022 which demonstrated indeterminate grouped versus clustered fine microcalcifcations at the 2 o'clock position of the left breast at mid depth. Of note, she does have a history of previous breast biopsies in the right breast on 12/10/2015 that detected fibroadenomas at the 8 o'clock position, 5 cm from the nipple measuring 11 x 6 x 10 mm, and at the 10 o'clock position, 7 cm from the nipple measuring 8 x 4 x 7 mm. Within the 8 o'clock position, rare calcifications were noted adjacent to the fibroadenoma. She also had a diagnostic mammogram for a palpable lump in the left breast on 5/21/2019 at the 2 o'clock position. Focused left breast ultrasound demonstrated an anechoic cyst at the 2 o'clock psition, 8 cm from the nipple measuring 1.7 x 1.2 x 1.5 cm, noted to be benign. An additional anechoic cyst was noted at 1:30 position, 7 cm from the nipple measurign up to 8 mm and routine yearly mammography was recommended.     She underwent stereotactic guided biopsy at [facility] on 1/10/2023 with pathology (Specimen -[id number]) demonstrating Nuclear Grade 3 ER+ (11-20%, weak) DCIS with central comedonecrosis, with less than 1 mm of invasive disease noted within the specimen.     On 2/15/2023 she  underwent seed-localized lumpectomy and SLN biopsy by Dr. Diggs, with pathology showing IDC grade 2, 2.5mm in greatest extent with associated DCIS, intermediate grade measuring up to 2.0 cm.    On 2023 she had bilateral reduction mammoplasty by Dr. Arnold, with pathology negative for atypia or malignancy.     Breast Cancer Risk Factors:   Breast biopsies: 2 breast biopsies on the right breast that demonstrated fibroadenomas  OCPs: duration of 10 years in college  Menarche: around 7th grade, estimated at 15 years old  Gestational history: 3 pregnancies, 2 live births at 30 years and 32 years, 1  at 20 years  Currently using Mirena IUD for significant bleeding from fibroids. Estimated LMP is 2023  No first degree relatives with breast or ovarian cancer.  She does have a family history of a maternal great aunt and maternal first cousin with breast cancer.     Menstrual history: Menarche age 15.  First pregnancy age 20.  Ended in .  Pregnancies age 30 and 32 with live births.  She has fibroids has a Mirena IUD with light periods.  She did have a history of heavy menstrual bleeding related to the fibroids, before the Mirena IUD was placed.  No history of hormone replacement therapy.     PMH:   Attention deficit disorder  Allergies  Migraines  Fibroids  The patient does not have a history of low bone density, diabetes mellitus, hyperlipidemia, or hypertension     She has no history of breast surgery, breast cancer in the past, radiation therapy in the past, radiation exposure in the past, tumor of any kind, heart problems, heart attack, breathing problems, blood clots, seizures, arthritis, peptic ulcer disease, osteoporosis, bone fractures.  She is not currently participating in a clinical trial.     PSH:  Intrauterine device insertion     MEDICATIONS:  Cetirizine  Cyclobenzaprine  Fremanezumab-vfrm (Ajovy)  Methylphenidate  Sumatriptan  Tretinoin  Adderall  Rimegepant  Epinephrine 0.3  mg/0.3 mL     ALLERGY:  Bee pollen  Walnuts  Doxycycline  Sulfa  Wasps        Dog hair  - She follows with Dr. Rufus Cardenas at [facility]     FAMILY HISTORY:  Mother with skin cancer on the leg at 65 years  Father with prostate cancer at 62 years old, currently alive, no recurrence [id number]. Cyclone 3+4.  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   She has two adult children, 20 and 22 years old  Occasional alcohol use  Never smoker  Does not use any other illicits     ECOG PERFORMANCE STATUS: 0     HISTORY OF RADIATION: No  IMPLANTED CARDIAC DEVICE: No  PREGNANCY RISK: She does currently still have regular menstrual periods, but is using a Mirena IUD and has light periods. The last noted menstrual cycle completed on January 22, 2023.      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 LEFT BREAST, SEED-LOCALIZED LUMPECTOMY:  - INVASIVE DUCTAL CARCINOMA, Chaplin grade 2, measuring 2.5 mm in greatest extent.  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  - Margins are negative for invasive carcinoma; closest uninvolved anterior margin is 6 mm.  - Margins are negative for DCIS; closest uninvolved inferior and medial margins are 1.5 mm.  - AJCC pathologic staging is pT1a  pN0(sn).  - Biopsy marker and biopsy site changes present.  - Fibrocystic change and columnar cell change with associated calcifications.  - Invasive carcinoma is ER (60%, weak) and NM (30%, moderate) positive, HER2 IHC is pending and the result will be reported in an addendum.     B. LEFT BREAST, NEW ANTERIOR MARGIN, EXCISION:  - Benign breast tissue with fibrocystic change, sclerosis adenosis and associated calcifications.  - Negative for atypia or malignancy.     C. LEFT BREAST, NEW LATERAL MARGIN, EXCISION:  - Benign breast tissue with fibrocystic change.  - Negative for atypia or malignancy.     D. LEFT BREAST, NEW MEDIAL MARGIN, EXCISION:  - Benign breast tissue with fibrocystic change, columnar cell change and associated calcifications.  - Negative for atypia or malignancy.     E. SENTINEL LYMPH NODE, LEFT AXILLA, BIOPSY #1:  - One lymph node, negative for malignancy (0/1).     F. SENTINEL LYMPH NODE, LEFT AXILLA, BIOPSY #2:  - One lymph node, negative for malignancy (0/1).     TREATMENT HISTORY:  A. Diagnosis with [id number] invasive ductal cancer of the left breast.  - Invasive carcinoma is ER (60%, weak) and NM (30%, moderate) positive,  HER2 negative 1+  B. Mirana IUD removed.  C. Lumpectomy  D. Bilateral mammoplasty breast reduction.  Follow up with Dr. Arnold.  E. Radiation  Treatment Summary:  Radiation Oncology - Course: 1         Protocol:   Treatment Site            Current Dose   Modality           From    To        Elapsed Days  Fx.  1 Left Breast     4,240 cGy       06 X      4/03/2023        4/28/2023        25       16                                                Dose per Fraction: 265 cGy   Total Dose:      4240 cGy  F. Hormonal therapy with tamoxifen planned.         COVID-19 vaccination  - She has had her last (4th) booster in November 2022.      INTERVAL HISTORY:    She is here today unaccompanied and feeling well overall.  -Did have one episode of oral thrush - likely related to her night  guard/inhaler.  She has been consistently cleaning/rinsing after use.  -Still having periods but not as heavy.  -Daughter was tested for NF1 mutation and was negative.  Her son has not yet as he was out of the country.   -Tennis elbow likely from pickle ball on the right.   -Still some other joint pains in hip, wrist, hands but she thinks better overall.  -Tamoxifen 5 mg still and tolerating this well.  -Denies hot flashes.  -Some issues with her asthma and her allergist has recommended a chest x-ray which she had and PCP follow-up.  She needs to establish with a PC and referral was placed at our last visit.      REVIEW OF SYSTEMS: A 10 point review of systems was negative.    PHYSICAL EXAM:  /62 (BP Location: Right arm, Patient Position: Sitting, Cuff Size: Adult Regular)   Pulse 79   Temp 97.9  F (36.6  C) (Oral)   Resp 16   Wt 77.9 kg (171 lb 11.2 oz)   SpO2 98%   BMI 28.75 kg/m    General: She appears generally well.  Lymph: No cervical supraclavicular subclavicular or axillary lymphadenopathy.  Lungs: Clear to auscultation bilaterally.  Heart: Regular rate and rhythm S1-S2.  Abdomen: Soft nontender without hepatosplenomegaly.  Extremities: Without edema.  Mood and affect were normal.  Breast: Post-left lumpectomy and bilateral reduction mammoplasty.  Incisions well-healed with no underlying nodularity.  No overlying skin changes or erythema.  No masses noted.    LABS::  Most Recent 3 CBC's:  Recent Labs   Lab Test 08/07/24  1020 05/06/24  1127 02/15/24  1221   WBC 4.9 4.3 5.9   HGB 13.1 12.5 12.3   MCV 87 86 87    206 223   ANEUTAUTO 3.0 2.6 4.1     Most Recent 3 BMP's:  Recent Labs   Lab Test 08/07/24  1020 05/06/24  1127 02/15/24  1221    142 140   POTASSIUM 3.5 3.5 3.6   CHLORIDE 106 111* 105   CO2 27 23 27   BUN 14.5 12.9 12.5   CR 0.62 0.57 0.58   ANIONGAP 9 8 8   NATHANAEL 8.8 7.7* 8.7   GLC 71 89 95   PROTTOTAL 6.8 6.1* 6.6   ALBUMIN 3.9 3.5 3.8    Most Recent 3 LFT's:  Recent Labs    Lab Test 08/07/24  1020 05/06/24  1127 02/15/24  1221   AST 19 15 19   ALT 11 10 19   ALKPHOS 46 39* 45   BILITOTAL 0.3 <0.2 <0.2     I reviewed the above labs today.    IMAGING:  No new imaging to review today.   I do review the MRI report from May prior to the visit:    FINDINGS: Posttreatment changes of left breast conservation therapy.  No suspicious enhancement in either breast. No lymphadenopathy.                                                                      IMPRESSION: BI-RADS CATEGORY: 2 - Benign.     RECOMMENDED FOLLOW-UP: Age and risk appropriate breast cancer  screening.      I have personally reviewed the examination and initial interpretation  and I agree with the findings.     JOSELINE CONROY MD     OTHER DATA:   Oncology notes reviewed today prior to visit.     ASSESSMENT AND PLAN:   Philippe Tom is a 52 year old pre-menopausal woman with a recent diagnosis of a left breast  nuclear grade 3 DCIS with a microinvasive component 2.5mm  -Post-left lumpectomy, bilateral mammoplasty reduction, and radiation.  -She continues on tamoxifen 5mg and is tolerating this well overall.   -Her periods have been monthly, and her flow is much less.  -She will having imaging every 6 months alternating MRI with mammogram, MRI done in May and mammogram scheduled for November.     Bone Health:  -150 minutes/week based on the lace and pathway study.  -VItaming D3 and Calcium.    NF1 gene:  -She has met with the specialist (Feb 2024).  -MRI brain done Feb 2024 with no concerning findings.  -Message to specialist per patient request regarding further testing on her tumor tissue.   -She was advised to call immediately if she feels any new lumps, bumps or pain in the area where she received radiation.  I do review this with her again today, and she is encouraged to call clinic with any concerns.  She states understanding of and agreement with this plan.    Follow up:  As scheduled with Dr. Judd in November  2024 with labs, mammogram.  She will call sooner with any concerns.      JOVANI Rowan, CNP  [facility] [address] [address]  [phone]    48 minutes spent on the date of the encounter doing chart review, review of outside records, review of test results, interpretation of tests, patient visit, and documentation and communication with team/NF specialist via message.      Again, thank you for allowing me to participate in the care of your patient.        Sincerely,        JOVANI Chao CNP

## 2024-10-18 ENCOUNTER — OFFICE VISIT (OUTPATIENT)
Dept: FAMILY MEDICINE | Facility: CLINIC | Age: 54
End: 2024-10-18
Payer: MEDICAID

## 2024-10-18 VITALS
WEIGHT: 170.3 LBS | RESPIRATION RATE: 16 BRPM | DIASTOLIC BLOOD PRESSURE: 84 MMHG | HEART RATE: 95 BPM | BODY MASS INDEX: 29.08 KG/M2 | HEIGHT: 64 IN | TEMPERATURE: 98.3 F | SYSTOLIC BLOOD PRESSURE: 133 MMHG | OXYGEN SATURATION: 99 %

## 2024-10-18 DIAGNOSIS — Z71.84 ENCOUNTER FOR COUNSELING FOR TRAVEL: Primary | ICD-10-CM

## 2024-10-18 DIAGNOSIS — Z23 NEED FOR IMMUNIZATION AGAINST YELLOW FEVER: ICD-10-CM

## 2024-10-18 PROCEDURE — 90471 IMMUNIZATION ADMIN: CPT | Performed by: PHYSICIAN ASSISTANT

## 2024-10-18 PROCEDURE — 90717 YELLOW FEVER VACCINE SUBQ: CPT | Performed by: PHYSICIAN ASSISTANT

## 2024-10-18 PROCEDURE — 99402 PREV MED CNSL INDIV APPRX 30: CPT | Mod: 25 | Performed by: PHYSICIAN ASSISTANT

## 2024-10-18 RX ORDER — ATOVAQUONE AND PROGUANIL HYDROCHLORIDE 250; 100 MG/1; MG/1
1 TABLET, FILM COATED ORAL DAILY
Qty: 25 TABLET | Refills: 0 | Status: SHIPPED | OUTPATIENT
Start: 2024-10-18

## 2024-10-18 RX ORDER — ACETAZOLAMIDE 125 MG/1
125 TABLET ORAL 2 TIMES DAILY
Qty: 8 TABLET | Refills: 0 | Status: SHIPPED | OUTPATIENT
Start: 2024-10-18

## 2024-10-18 RX ORDER — AZITHROMYCIN 500 MG/1
TABLET, FILM COATED ORAL
Qty: 3 TABLET | Refills: 0 | Status: SHIPPED | OUTPATIENT
Start: 2024-10-18

## 2024-10-18 RX ORDER — FLUCONAZOLE 150 MG/1
150 TABLET ORAL
Qty: 3 TABLET | Refills: 0 | Status: SHIPPED | OUTPATIENT
Start: 2024-10-18 | End: 2024-10-25

## 2024-10-18 NOTE — PATIENT INSTRUCTIONS
"See travel packet provided  Recommend ultrathon (mosquito repellant), pepto bismol and imodium  The food and drink choices you make while traveling can impact your likelihood of getting sick.   If you aren't sure if a food or drink is safe, the saying \" BOIL IT, COOK IT, PEEL IT, OR FORGET IT\" can help you decide whether it's okay to consume.   Also bring hand  and sun screen with you.  Safe Travels     If you first start to get mild to moderate diarrhea, take imodium.      If diarrhea is severe or you have a fever with the diarrhea, take the antibiotic (azithromycin).      Today October 18, 2024 you received the    Yellow Fever (YF).    These appointments can be made as a NURSE ONLY visit.    **It is very important for the vaccinations to be given on the scheduled day(s), this helps ensure you receive the full effectiveness of the vaccine.**    Please call [facility] with any questions [phone]    Thank you for visiting [facility]    "

## 2024-10-18 NOTE — PROGRESS NOTES
Prior to immunization administration, verified patients identity using patient s name and date of birth. Please see Immunization Activity for additional information.     Screening Questionnaire for Adult Immunization    Are you sick today?   No   Do you have allergies to medications, food, a vaccine component or latex?   yes   Have you ever had a serious reaction after receiving a vaccination?   No   Do you have a long-term health problem with heart, lung, kidney, or metabolic disease (e.g., diabetes), asthma, a blood disorder, no spleen, complement component deficiency, a cochlear implant, or a spinal fluid leak?  Are you on long-term aspirin therapy?   Yes asthma   Do you have cancer, leukemia, HIV/AIDS, or any other immune system problem?   No   Do you have a parent, brother, or sister with an immune system problem?   No   In the past 3 months, have you taken medications that affect  your immune system, such as prednisone, other steroids, or anticancer drugs; drugs for the treatment of rheumatoid arthritis, Crohn s disease, or psoriasis; or have you had radiation treatments?   yes   Have you had a seizure, or a brain or other nervous system problem?   No   During the past year, have you received a transfusion of blood or blood    products, or been given immune (gamma) globulin or antiviral drug?   No   For women: Are you pregnant or is there a chance you could become       pregnant during the next month?   No   Have you received any vaccinations in the past 4 weeks?   Yes covid and flu     Immunization questionnaire answers were all negative.      Patient instructed to remain in clinic for 15 minutes afterwards, and to report any adverse reactions.     Screening performed by Shiela Paul MA on 10/18/2024 at 9:38 AM.

## 2024-10-18 NOTE — PROGRESS NOTES
SUBJECTIVE: Philippe CHAU Medellin , a 54 year old  female, presents for counseling and information regarding upcoming travel to [place]. Special medical concerns include: none. She anticipates the following unusual exposures: none.    Itinerary:  [place]    Departure Date: 10/31/24 Return date: 11/15/24    Reason for travel (i.e. Business, pleasure): both    Visiting an urban or rural area?: both    Accommodations (i.e. hotel, hostel, friends, family, etc): hotels, [employer]    Women - First day of your last period: 10/2/24    IMMUNIZATION HISTORY  Have you received any vaccinations in the past 4 weeks? If so, which?  Covid and flu   Have you ever fainted from having your blood drawn or from an injection?  No  Have you ever had any bad reaction or side effect from any vaccination?  If so, which? No  Do you live (or work closely) with anyone who has AIDS, an AIDS-like condition, any other immune disorder or who is on chemotherapy for cancer?  No  Have you received any injection of immune globulin or any blood products during the past 12 months?  No    GENERAL MEDICAL HISTORY  Do you have a medical condition that requires medicine or doctor follow-up visits?  No  Do you have a medical condition that is stable now, but that may recur while traveling?  No  Has your spleen been removed?  No  Have you had an illness or a fever in the past 48 hours?  No  Are you pregnant, or might you become pregnant on this trip?  Any chance of pregnancy?  No  Are you breastfeeding?  No  Do you have HIV, AIDS, an AIDS-like condition, any other immune disorder, leukemia or cancer?  Yes, was treated for cancer  Have you had your thymus gland removed or history of problems with your thymus, such as myasthenia gravis, DiGeorge syndrome, or thymoma?  No  Do you have a severely low platelet count (thrombocytopenia) or a blood clotting disorder?  No  Have you ever had a convulsion, seizure, epilepsy, neurologic condition or brain  infection?  No  Do you have any stomach conditions?  No  Do you have severe renal or kidney impairment?  No  Do you have a history of mental health concerns?  No  Do you get yeast infections often?  yes  Do you have psoriasis?  No  Do you get motion sickness?  yes  Have you ever had headaches, nausea, vomiting, or breathing problems from altitude exposure?  No    MEDICINES  Are you taking:   Steroids, prednisone, anti-cancer drugs, or medicines that suppress your immune system? yes  Antibiotics or sulfonamides? No  Oral contraceptives (birth control pills)? No  Aspirin therapy (children and teens)? No    ALLERGIES  Are you allergic to:  Any medicines? No  Any foods or other? No  Neomycin, formalin, or fish products? No      Past Medical History:   Diagnosis Date    ADD (attention deficit disorder)     diagnosed 1998     Allergies     had allergy testing as a child    Asthma     Breast cancer (H) 01/2023    Migraine       Immunization History   Administered Date(s) Administered    COVID-19 12+ (MODERNA) 09/23/2024    COVID-19 12+ (Pfizer) 10/09/2023    COVID-19 Bivalent 18+ (Moderna) 11/17/2022    COVID-19 MONOVALENT 12+ (Pfizer) 04/03/2021, 04/24/2021, 11/27/2021    Hepatitis A (ADULT 19+) 12/29/2023    Hepatitis B, Adult 01/01/2004, 02/01/2004, 01/01/2005    Influenza (H1N1) 12/16/2009    Influenza Vaccine >6 months,quad, PF 01/10/2017, 11/01/2020    Influenza Vaccine, 6+MO IM (QUADRIVALENT W/PRESERVATIVES) 12/13/2021, 11/15/2022    Influenza, Split Virus, Trivalent, Pf (Fluzone\Fluarix) 10/01/2009, 09/26/2012, 10/13/2024    Influenza,INJ,MDCK,PF,Quad >6mo(Flucelvax) 01/25/2018, 12/29/2023    TD,PF 7+ (Tenivac) 12/13/2022    TDAP Vaccine (Adacel) 04/24/2009    Typhoid IM 12/29/2023    Zoster recombinant adjuvanted (SHINGRIX) 12/13/2022       Current Outpatient Medications   Medication Sig Dispense Refill    acetaminophen (TYLENOL) 325 MG tablet Take 2 tablets (650 mg) by mouth every 4 hours as needed for mild pain  50 tablet 0    ammonium lactate (AMLACTIN) 12 % external cream Apply 1 Application topically      cetirizine (ZYRTEC) 10 MG tablet take 1 tablet (10 mg) by oral route once daily for allergies      cyclobenzaprine (FLEXERIL) 10 MG tablet Take 1 tablet by mouth every evening      EPINEPHrine (ANY BX GENERIC EQUIV) 0.3 MG/0.3ML injection 2-pack Inject 0.3 mg into the muscle as needed for anaphylaxis May repeat one time in 5-15 minutes if response to initial dose is inadequate. (Patient not taking: Reported on 6/11/2024)      ferrous sulfate (SLO-FE) 142 (45 Fe) MG CR tablet Take 1 tablet (142 mg) by mouth daily 90 tablet 0    fluconazole (DIFLUCAN) 150 MG tablet One tablet now.  May repeat in 72 hours if needed. 2 tablet 0    ketotifen (ZADITOR) 0.025 % ophthalmic solution Place 1 drop into both eyes as needed      magnesium oxide 200 MG TABS Take 200 mg by mouth daily      methylphenidate (CONCERTA) 36 MG CR tablet Take 36 mg by mouth 2 times daily      methylphenidate (RITALIN) 20 MG tablet Take 20 mg by mouth every morning      MULTIVITAMINS OR Take by mouth daily (with lunch)      PROAIR  (90 BASE) MCG/ACT IN AERS Inhale into the lungs as needed      SODIUM FLUORIDE 5000 PPM 1.1 % PSTE dental paste Apply to affected area 2 times daily (Patient not taking: Reported on 11/7/2023)      SUMAtriptan (IMITREX) 100 MG tablet Take 2 tablets by mouth at onset of headache for migraine      SUMAtriptan (IMITREX) 20 MG/ACT nasal spray Spray 1 spray in nostril as needed      tamoxifen (NOLVADEX) 10 MG tablet TAKE ONE HALF TABLET BY MOUTH EVERY DAY 45 tablet 2    tretinoin (RETIN-A) 0.025 % external cream Apply topically At Bedtime      UNABLE TO FIND daily (with lunch) MEDICATION NAME: Turkey Tail      Vitamin D (Cholecalciferol) 50 MCG (2000 UT) CAPS Take 1 tablet by mouth daily      ZOLMitriptan (ZOMIG-ZMT) 5 MG ODT Take 5 mg by mouth as needed (Patient not taking: Reported on 6/11/2024)       Allergies   Allergen  Reactions    Bee Pollen Anaphylaxis     Other reaction(s): anaphylaxis  BEE Venom      Dog Epithelium (Canis Lupus Familiaris) Difficulty breathing and Shortness Of Breath     Other reaction(s): difficulty breathing    Walnuts [Nuts] Hives    Doxycycline Rash    Sulfa Antibiotics Rash    Sulfamethoxazole-Trimethoprim      Other reaction(s): Unknown    Trimethoprim Rash    Wasp Venom      Other reaction(s): Unknown        EXAM: deferred    Immunizations discussed include: Hepatitis A and Yellow Fever (she states that she had a hepatitis A vaccine previous to our records)  Malaria prophylaxis recommended: Malarone  Symptomatic treatment for traveler's diarrhea: bismuth subsalicylate, loperamide/diphenoxylate, and azithromycin    ASSESSMENT/PLAN:    (Z71.84) Encounter for counseling for travel  (primary encounter diagnosis)    Comment: Yellow fever vaccines today. Patient will return or follow-up with PCP as needed. Prophylaxis given for Traveler's diarrhea and Malaria. All questions were answered.     Plan: atovaquone-proguanil (MALARONE) 250-100 MG         tablet, fluconazole (DIFLUCAN) 150 MG tablet,         azithromycin (ZITHROMAX) 500 MG tablet,         acetaZOLAMIDE (DIAMOX) 125 MG tablet            (Z23) Need for immunization against yellow fever  Comment:   Plan: YELLOW FEVER, LIVE SQ              I have reviewed general recommendations for safe travel   including: food/water precautions, insect avoidance, safe sex   practices given high prevalence of HIV and other STDs,   roadway safety. Educational materials and links to the CDC   Traveler's health website have been provided.    Total time 31 minutes, greater than 50 percent in counseling   and coordination of care.

## 2024-11-19 ENCOUNTER — LAB (OUTPATIENT)
Dept: LAB | Facility: CLINIC | Age: 54
End: 2024-11-19
Attending: INTERNAL MEDICINE
Payer: MEDICAID

## 2024-11-19 ENCOUNTER — ANCILLARY PROCEDURE (OUTPATIENT)
Dept: MAMMOGRAPHY | Facility: CLINIC | Age: 54
End: 2024-11-19
Attending: INTERNAL MEDICINE
Payer: MEDICAID

## 2024-11-19 DIAGNOSIS — C50.012 MALIGNANT NEOPLASM OF NIPPLE OF LEFT BREAST IN FEMALE, ESTROGEN RECEPTOR POSITIVE (H): ICD-10-CM

## 2024-11-19 DIAGNOSIS — Z17.0 MALIGNANT NEOPLASM OF UPPER-OUTER QUADRANT OF LEFT BREAST IN FEMALE, ESTROGEN RECEPTOR POSITIVE (H): ICD-10-CM

## 2024-11-19 DIAGNOSIS — Z17.0 MALIGNANT NEOPLASM OF NIPPLE OF LEFT BREAST IN FEMALE, ESTROGEN RECEPTOR POSITIVE (H): ICD-10-CM

## 2024-11-19 DIAGNOSIS — C50.412 MALIGNANT NEOPLASM OF UPPER-OUTER QUADRANT OF LEFT BREAST IN FEMALE, ESTROGEN RECEPTOR POSITIVE (H): ICD-10-CM

## 2024-11-19 LAB
ALBUMIN SERPL BCG-MCNC: 4 G/DL (ref 3.5–5.2)
ALP SERPL-CCNC: 46 U/L (ref 40–150)
ALT SERPL W P-5'-P-CCNC: 13 U/L (ref 0–50)
ANION GAP SERPL CALCULATED.3IONS-SCNC: 8 MMOL/L (ref 7–15)
AST SERPL W P-5'-P-CCNC: 19 U/L (ref 0–45)
BASOPHILS # BLD AUTO: 0 10E3/UL (ref 0–0.2)
BASOPHILS NFR BLD AUTO: 0 %
BILIRUB SERPL-MCNC: 0.2 MG/DL
BUN SERPL-MCNC: 13.1 MG/DL (ref 6–20)
CALCIUM SERPL-MCNC: 9 MG/DL (ref 8.8–10.4)
CHLORIDE SERPL-SCNC: 107 MMOL/L (ref 98–107)
CREAT SERPL-MCNC: 0.67 MG/DL (ref 0.51–0.95)
EGFRCR SERPLBLD CKD-EPI 2021: >90 ML/MIN/1.73M2
EOSINOPHIL # BLD AUTO: 0.1 10E3/UL (ref 0–0.7)
EOSINOPHIL NFR BLD AUTO: 3 %
ERYTHROCYTE [DISTWIDTH] IN BLOOD BY AUTOMATED COUNT: 13.8 % (ref 10–15)
GLUCOSE SERPL-MCNC: 87 MG/DL (ref 70–99)
HCO3 SERPL-SCNC: 26 MMOL/L (ref 22–29)
HCT VFR BLD AUTO: 40.8 % (ref 35–47)
HGB BLD-MCNC: 13 G/DL (ref 11.7–15.7)
IMM GRANULOCYTES # BLD: 0 10E3/UL
IMM GRANULOCYTES NFR BLD: 0 %
LYMPHOCYTES # BLD AUTO: 1.3 10E3/UL (ref 0.8–5.3)
LYMPHOCYTES NFR BLD AUTO: 26 %
MCH RBC QN AUTO: 28.1 PG (ref 26.5–33)
MCHC RBC AUTO-ENTMCNC: 31.9 G/DL (ref 31.5–36.5)
MCV RBC AUTO: 88 FL (ref 78–100)
MONOCYTES # BLD AUTO: 0.5 10E3/UL (ref 0–1.3)
MONOCYTES NFR BLD AUTO: 10 %
NEUTROPHILS # BLD AUTO: 2.9 10E3/UL (ref 1.6–8.3)
NEUTROPHILS NFR BLD AUTO: 61 %
NRBC # BLD AUTO: 0 10E3/UL
NRBC BLD AUTO-RTO: 0 /100
PLATELET # BLD AUTO: 219 10E3/UL (ref 150–450)
POTASSIUM SERPL-SCNC: 3.8 MMOL/L (ref 3.4–5.3)
PROT SERPL-MCNC: 6.9 G/DL (ref 6.4–8.3)
RBC # BLD AUTO: 4.63 10E6/UL (ref 3.8–5.2)
SODIUM SERPL-SCNC: 141 MMOL/L (ref 135–145)
WBC # BLD AUTO: 4.8 10E3/UL (ref 4–11)

## 2024-11-19 PROCEDURE — G0279 TOMOSYNTHESIS, MAMMO: HCPCS | Performed by: RADIOLOGY

## 2024-11-19 PROCEDURE — 82565 ASSAY OF CREATININE: CPT

## 2024-11-19 PROCEDURE — 85014 HEMATOCRIT: CPT

## 2024-11-19 PROCEDURE — 36415 COLL VENOUS BLD VENIPUNCTURE: CPT

## 2024-11-19 PROCEDURE — 84155 ASSAY OF PROTEIN SERUM: CPT

## 2024-11-19 PROCEDURE — 85004 AUTOMATED DIFF WBC COUNT: CPT

## 2024-11-19 PROCEDURE — 77066 DX MAMMO INCL CAD BI: CPT | Performed by: RADIOLOGY

## 2024-11-19 NOTE — NURSING NOTE
Chief Complaint   Patient presents with    Labs Only     Labs drawn via  by RN in lab       Labs collected from venipuncture by RN.    Valeria Winters RN

## 2025-01-19 DIAGNOSIS — C50.412 MALIGNANT NEOPLASM OF UPPER-OUTER QUADRANT OF LEFT BREAST IN FEMALE, ESTROGEN RECEPTOR POSITIVE (H): ICD-10-CM

## 2025-01-19 DIAGNOSIS — Z17.0 MALIGNANT NEOPLASM OF UPPER-OUTER QUADRANT OF LEFT BREAST IN FEMALE, ESTROGEN RECEPTOR POSITIVE (H): ICD-10-CM

## 2025-01-20 RX ORDER — TAMOXIFEN CITRATE 10 MG/1
TABLET ORAL
Qty: 45 TABLET | Refills: 0 | Status: SHIPPED | OUTPATIENT
Start: 2025-01-20

## 2025-01-26 ENCOUNTER — HEALTH MAINTENANCE LETTER (OUTPATIENT)
Age: 55
End: 2025-01-26

## 2025-02-03 ENCOUNTER — APPOINTMENT (OUTPATIENT)
Dept: LAB | Facility: CLINIC | Age: 55
End: 2025-02-03
Attending: NURSE PRACTITIONER
Payer: MEDICAID

## 2025-02-03 ENCOUNTER — ONCOLOGY VISIT (OUTPATIENT)
Dept: ONCOLOGY | Facility: CLINIC | Age: 55
End: 2025-02-03
Attending: NURSE PRACTITIONER
Payer: MEDICAID

## 2025-02-03 VITALS
TEMPERATURE: 97.7 F | BODY MASS INDEX: 28.94 KG/M2 | HEART RATE: 104 BPM | SYSTOLIC BLOOD PRESSURE: 127 MMHG | RESPIRATION RATE: 18 BRPM | DIASTOLIC BLOOD PRESSURE: 83 MMHG | OXYGEN SATURATION: 98 % | WEIGHT: 168.6 LBS

## 2025-02-03 DIAGNOSIS — C80.1 LYMPHEDEMA DUE TO MALIGNANT INFILTRATION (H): ICD-10-CM

## 2025-02-03 DIAGNOSIS — I89.0 LYMPHEDEMA DUE TO MALIGNANT INFILTRATION (H): ICD-10-CM

## 2025-02-03 DIAGNOSIS — D05.12 DUCTAL CARCINOMA IN SITU (DCIS) OF LEFT BREAST: ICD-10-CM

## 2025-02-03 DIAGNOSIS — C50.412 MALIGNANT NEOPLASM OF UPPER-OUTER QUADRANT OF LEFT BREAST IN FEMALE, ESTROGEN RECEPTOR POSITIVE (H): Primary | ICD-10-CM

## 2025-02-03 DIAGNOSIS — Z17.0 MALIGNANT NEOPLASM OF UPPER-OUTER QUADRANT OF LEFT BREAST IN FEMALE, ESTROGEN RECEPTOR POSITIVE (H): Primary | ICD-10-CM

## 2025-02-03 LAB
ALBUMIN SERPL BCG-MCNC: 4 G/DL (ref 3.5–5.2)
ALP SERPL-CCNC: 45 U/L (ref 40–150)
ALT SERPL W P-5'-P-CCNC: 14 U/L (ref 0–50)
ANION GAP SERPL CALCULATED.3IONS-SCNC: 9 MMOL/L (ref 7–15)
AST SERPL W P-5'-P-CCNC: 20 U/L (ref 0–45)
BASOPHILS # BLD AUTO: 0 10E3/UL (ref 0–0.2)
BASOPHILS NFR BLD AUTO: 1 %
BILIRUB SERPL-MCNC: <0.2 MG/DL
BUN SERPL-MCNC: 14.8 MG/DL (ref 6–20)
CALCIUM SERPL-MCNC: 8.8 MG/DL (ref 8.8–10.4)
CHLORIDE SERPL-SCNC: 107 MMOL/L (ref 98–107)
CREAT SERPL-MCNC: 0.66 MG/DL (ref 0.51–0.95)
EGFRCR SERPLBLD CKD-EPI 2021: >90 ML/MIN/1.73M2
EOSINOPHIL # BLD AUTO: 0.1 10E3/UL (ref 0–0.7)
EOSINOPHIL NFR BLD AUTO: 1 %
ERYTHROCYTE [DISTWIDTH] IN BLOOD BY AUTOMATED COUNT: 13.7 % (ref 10–15)
GLUCOSE SERPL-MCNC: 88 MG/DL (ref 70–99)
HCO3 SERPL-SCNC: 27 MMOL/L (ref 22–29)
HCT VFR BLD AUTO: 39.9 % (ref 35–47)
HGB BLD-MCNC: 13.1 G/DL (ref 11.7–15.7)
IMM GRANULOCYTES # BLD: 0 10E3/UL
IMM GRANULOCYTES NFR BLD: 0 %
LYMPHOCYTES # BLD AUTO: 1.2 10E3/UL (ref 0.8–5.3)
LYMPHOCYTES NFR BLD AUTO: 19 %
MCH RBC QN AUTO: 27.7 PG (ref 26.5–33)
MCHC RBC AUTO-ENTMCNC: 32.8 G/DL (ref 31.5–36.5)
MCV RBC AUTO: 84 FL (ref 78–100)
MONOCYTES # BLD AUTO: 0.5 10E3/UL (ref 0–1.3)
MONOCYTES NFR BLD AUTO: 8 %
NEUTROPHILS # BLD AUTO: 4.3 10E3/UL (ref 1.6–8.3)
NEUTROPHILS NFR BLD AUTO: 71 %
NRBC # BLD AUTO: 0 10E3/UL
NRBC BLD AUTO-RTO: 0 /100
PLATELET # BLD AUTO: 231 10E3/UL (ref 150–450)
POTASSIUM SERPL-SCNC: 3.7 MMOL/L (ref 3.4–5.3)
PROT SERPL-MCNC: 7 G/DL (ref 6.4–8.3)
RBC # BLD AUTO: 4.73 10E6/UL (ref 3.8–5.2)
SODIUM SERPL-SCNC: 143 MMOL/L (ref 135–145)
WBC # BLD AUTO: 6.1 10E3/UL (ref 4–11)

## 2025-02-03 PROCEDURE — 99215 OFFICE O/P EST HI 40 MIN: CPT | Performed by: NURSE PRACTITIONER

## 2025-02-03 PROCEDURE — 85004 AUTOMATED DIFF WBC COUNT: CPT | Performed by: NURSE PRACTITIONER

## 2025-02-03 PROCEDURE — 82310 ASSAY OF CALCIUM: CPT | Performed by: NURSE PRACTITIONER

## 2025-02-03 PROCEDURE — 36415 COLL VENOUS BLD VENIPUNCTURE: CPT | Performed by: NURSE PRACTITIONER

## 2025-02-03 PROCEDURE — G0463 HOSPITAL OUTPT CLINIC VISIT: HCPCS | Performed by: NURSE PRACTITIONER

## 2025-02-03 PROCEDURE — 85018 HEMOGLOBIN: CPT | Performed by: NURSE PRACTITIONER

## 2025-02-03 PROCEDURE — 84155 ASSAY OF PROTEIN SERUM: CPT | Performed by: NURSE PRACTITIONER

## 2025-02-03 PROCEDURE — 84075 ASSAY ALKALINE PHOSPHATASE: CPT | Performed by: NURSE PRACTITIONER

## 2025-02-03 RX ORDER — CEFUROXIME AXETIL 250 MG/1
1 TABLET ORAL
COMMUNITY
Start: 2025-01-28

## 2025-02-03 ASSESSMENT — PAIN SCALES - GENERAL: PAINLEVEL_OUTOF10: NO PAIN (0)

## 2025-02-03 NOTE — LETTER
2/3/2025      Philippe Medellin  [address]  [place] [address]      Dear Colleague,    Thank you for referring your patient, Philippe Medellin, to the [facility]. Please see a copy of my visit note below.    MEDICAL ONCOLOGY FOLLOW UP NOTE    PROBLEM: New diagnosis of left sided DCIS with invasive component     HISTORY OF PRESENT ILLNESS: Ms. Tom is a 54 year old woman from [place], [place] with a diagnosis of left breast Nuclear Grade 3 ER+(15-20%) DCIS with less than 1 mm of invasive disease as part of workup following routine screening mammogram that detected calcifications.      Per prior notes, she had a routine screening mammogram on 12/27/2022 which demonstrated indeterminate grouped versus clustered fine microcalcifcations at the 2 o'clock position of the left breast at mid depth. Of note, she does have a history of previous breast biopsies in the right breast on 12/10/2015 that detected fibroadenomas at the 8 o'clock position, 5 cm from the nipple measuring 11 x 6 x 10 mm, and at the 10 o'clock position, 7 cm from the nipple measuring 8 x 4 x 7 mm. Within the 8 o'clock position, rare calcifications were noted adjacent to the fibroadenoma. She also had a diagnostic mammogram for a palpable lump in the left breast on 5/21/2019 at the 2 o'clock position. Focused left breast ultrasound demonstrated an anechoic cyst at the 2 o'clock psition, 8 cm from the nipple measuring 1.7 x 1.2 x 1.5 cm, noted to be benign. An additional anechoic cyst was noted at 1:30 position, 7 cm from the nipple measurign up to 8 mm and routine yearly mammography was recommended.     She underwent stereotactic guided biopsy at [facility] on 1/10/2023 with pathology (Specimen -[id number]) demonstrating Nuclear Grade 3 ER+ (11-20%, weak) DCIS with central comedonecrosis, with less than 1 mm of invasive disease noted within the specimen.     On 2/15/2023 she  underwent seed-localized lumpectomy and SLN biopsy by Dr. Diggs, with pathology showing IDC grade 2, 2.5mm in greatest extent with associated DCIS, intermediate grade measuring up to 2.0 cm.    On 2023 she had bilateral reduction mammoplasty by Dr. Arnold, with pathology negative for atypia or malignancy.     Breast Cancer Risk Factors:   Breast biopsies: 2 breast biopsies on the right breast that demonstrated fibroadenomas  OCPs: duration of 10 years in college  Menarche: around 7th grade, estimated at 15 years old  Gestational history: 3 pregnancies, 2 live births at 30 years and 32 years, 1  at 20 years  Currently using Mirena IUD for significant bleeding from fibroids. Estimated LMP is 2023  No first degree relatives with breast or ovarian cancer.  She does have a family history of a maternal great aunt and maternal first cousin with breast cancer.     Menstrual history: Menarche age 15.  First pregnancy age 20.  Ended in .  Pregnancies age 30 and 32 with live births.  She has fibroids has a Mirena IUD with light periods.  She did have a history of heavy menstrual bleeding related to the fibroids, before the Mirena IUD was placed.  No history of hormone replacement therapy.     PMH:   Attention deficit disorder  Allergies  Migraines  Fibroids  The patient does not have a history of low bone density, diabetes mellitus, hyperlipidemia, or hypertension     She has no history of breast surgery, breast cancer in the past, radiation therapy in the past, radiation exposure in the past, tumor of any kind, heart problems, heart attack, breathing problems, blood clots, seizures, arthritis, peptic ulcer disease, osteoporosis, bone fractures.  She is not currently participating in a clinical trial.     PSH:  Intrauterine device insertion     MEDICATIONS:  Cetirizine  Cyclobenzaprine  Fremanezumab-vfrm (Ajovy)  Methylphenidate  Sumatriptan  Tretinoin  Adderall  Rimegepant  Epinephrine 0.3  mg/0.3 mL     ALLERGY:  Bee pollen  Walnuts  Doxycycline  Sulfa  Wasps        Dog hair  - She follows with Dr. Rufus Cardenas at [facility]     FAMILY HISTORY:  Mother with skin cancer on the leg at 65 years  Father with prostate cancer at 62 years old, currently alive, no recurrence [id number]. Velma 3+4.  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   She has two adult children, 20 and 22 years old  Occasional alcohol use  Never smoker  Does not use any other illicits     ECOG PERFORMANCE STATUS: 0     HISTORY OF RADIATION: No  IMPLANTED CARDIAC DEVICE: No  PREGNANCY RISK: She does currently still have regular menstrual periods, but is using a Mirena IUD and has light periods. The last noted menstrual cycle completed on January 22, 2023.      GENETICS:  NF1 c.1586T>C ([id number]) possibly mosaic Uncertain Significance     PATHOLOGY:  A. LEFT BREAST, SEED-LOCALIZED LUMPECTOMY:  - INVASIVE DUCTAL CARCINOMA, [place] grade 2, measuring 2.5 mm in greatest extent.  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  - Margins are negative for invasive carcinoma; closest uninvolved anterior margin is 6 mm.  - Margins are negative for DCIS; closest uninvolved inferior and medial margins are 1.5 mm.  - AJCC pathologic staging is pT1a  pN0(sn).  - Biopsy marker and biopsy site changes present.  - Fibrocystic change and columnar cell change with associated calcifications.  - Invasive carcinoma is ER (60%, weak) and FL (30%, moderate) positive, HER2 IHC is pending and the result will be reported in an addendum.     B. LEFT BREAST, NEW ANTERIOR MARGIN, EXCISION:  - Benign breast tissue with fibrocystic change, sclerosis adenosis and associated calcifications.  - Negative for atypia or malignancy.     C. LEFT BREAST, NEW LATERAL MARGIN, EXCISION:  - Benign breast tissue with fibrocystic change.  - Negative for atypia or malignancy.     D. LEFT BREAST, NEW MEDIAL MARGIN, EXCISION:  - Benign breast tissue with fibrocystic change, columnar cell change and associated calcifications.  - Negative for atypia or malignancy.     E. SENTINEL LYMPH NODE, LEFT AXILLA, BIOPSY #1:  - One lymph node, negative for malignancy (0/1).     F. SENTINEL LYMPH NODE, LEFT AXILLA, BIOPSY #2:  - One lymph node, negative for malignancy (0/1).     TREATMENT HISTORY:  A. Diagnosis with [id number] invasive ductal cancer of the left breast.  - Invasive carcinoma is ER (60%, weak) and FL (30%, moderate) positive,  HER2 negative 1+  B. Mirana IUD removed.  C. Lumpectomy  D. Bilateral mammoplasty breast reduction.  Follow up with Dr. Arnold.  E. Radiation  Treatment Summary:  Radiation Oncology - Course: 1         Protocol:   Treatment Site            Current Dose   Modality           From    To        Elapsed Days  Fx.  1 Left Breast     4,240 cGy       06 X      4/03/2023        4/28/2023        25       16                                                Dose per Fraction: 265 cGy   Total Dose:      4240 cGy  F. Hormonal therapy with tamoxifen planned.         COVID-19 vaccination  - She has had her last (4th) booster in November 2022.      INTERVAL HISTORY:    She is here today unaccompanied and feeling well overall.  -She [place] and had [place] good trip.  -Wearing  lymphedema sleeve and feels her swelling is waxing and waning.  Rx written for her to get new ones.  She will follow-up with Mayi.   -She has been taking an over the counter diuretic Pamabrom 50 mg approximately 4-8 times per months.  -Working closely with her allergist Dr Olmos.  Currently has a sinus infection being treated.   -No chest pain, no swelling that does not resolve.   -Body pump classes and dance classes and cardio-step classes.   -LMP January 17th, one New Years Leila that was light.  This is new for her.  She typically has been q 28 days. Her last OB/GYN retired - she thinks her last was in 2023, needs PAP - last maybe in 2023 at outside facility.   -No alcohol intake.   -Still tolerating the tamoxifen well overall.  -Sometimes she does get really warm but no overt hot flashes.   -Tennis elbow is still there a little bit.      REVIEW OF SYSTEMS: A 10 point review of systems was negative.    PHYSICAL EXAM:  /62 (BP Location: Right arm, Patient Position: Sitting, Cuff Size: Adult Regular)   Pulse 79   Temp 97.9  F (36.6  C) (Oral)   Resp 16   Wt 77.9 kg (171 lb 11.2 oz)   SpO2 98%   BMI 28.75 kg/m    General: She appears generally well.  Lymph: No cervical supraclavicular subclavicular or axillary lymphadenopathy.  Lungs: Clear to auscultation bilaterally.  Heart: Regular rate and rhythm S1-S2.  Abdomen: Soft nontender without hepatosplenomegaly.  Extremities: Without edema.  Mood and affect were normal.  Breast: Post-left lumpectomy and bilateral reduction mammoplasty.  Incisions well-healed with no underlying nodularity.  No overlying skin changes or erythema.  No masses noted.    LABS::  Labs scheduled for next week.    IMAGING:  No new imaging to review today.  Mammogram results from 11/19/2024 reviewed.   Due for mammogram in May 2025.     OTHER DATA:   Oncology notes reviewed today prior to visit.     ASSESSMENT AND PLAN:   Philippe Tom is a 54 year old woman with a diagnosis of a  left breast nuclear grade 3 DCIS with a microinvasive component 2.5mm  -Post-left lumpectomy, bilateral mammoplasty reduction, and radiation.  -She continues on tamoxifen 5mg and is tolerating this well overall.   -Her periods have been monthly, and her flow is much less.  She did have some breakthrough bleeding; message to gyn provider she saw here.  -She will having imaging every 6 months alternating MRI with mammogram, MRI done in May and mammogram was done in November.   -Orders for May 2025 MRI placed today.     Bone Health:  -150 minutes/week based on the lace and pathway study.  -VItaming D3 and Calcium.    NF1 gene:  -She has met with the specialist (Feb 2024).  -MRI brain done Feb 2024 with no concerning findings.  -Message to specialist per patient request regarding further testing on her tumor tissue.   -She was advised to call immediately if she feels any new lumps, bumps or pain in the area where she received radiation.  I do review this with her again today, and she is encouraged to call clinic with any concerns.  She states understanding of and agreement with this plan.    Follow up:  As scheduled with Dr. Judd in May 2025 with labs, breast MRI.  She will call sooner with any concerns.      JOVANI Rowan, CNP  [facility]  [address] [place] [address] [phone]    55 minutes spent on the date of the encounter doing chart review, review of outside records, review of test results, interpretation of tests, patient visit, and documentation and communication with team/NF specialist via message.      Again, thank you for allowing me to participate in the care of your patient.        Sincerely,        JOVANI Chao CNP    Electronically signed

## 2025-02-03 NOTE — PROGRESS NOTES
MEDICAL ONCOLOGY FOLLOW UP NOTE    PROBLEM: New diagnosis of left sided DCIS with invasive component     HISTORY OF PRESENT ILLNESS: Ms. Tom is a 54 year old woman from [place], [place] with a diagnosis of left breast Nuclear Grade 3 ER+(15-20%) DCIS with less than 1 mm of invasive disease as part of workup following routine screening mammogram that detected calcifications.      Per prior notes, she had a routine screening mammogram on 12/27/2022 which demonstrated indeterminate grouped versus clustered fine microcalcifcations at the 2 o'clock position of the left breast at mid depth. Of note, she does have a history of previous breast biopsies in the right breast on 12/10/2015 that detected fibroadenomas at the 8 o'clock position, 5 cm from the nipple measuring 11 x 6 x 10 mm, and at the 10 o'clock position, 7 cm from the nipple measuring 8 x 4 x 7 mm. Within the 8 o'clock position, rare calcifications were noted adjacent to the fibroadenoma. She also had a diagnostic mammogram for a palpable lump in the left breast on 5/21/2019 at the 2 o'clock position. Focused left breast ultrasound demonstrated an anechoic cyst at the 2 o'clock psition, 8 cm from the nipple measuring 1.7 x 1.2 x 1.5 cm, noted to be benign. An additional anechoic cyst was noted at 1:30 position, 7 cm from the nipple measurign up to 8 mm and routine yearly mammography was recommended.     She underwent stereotactic guided biopsy at [facility] on 1/10/2023 with pathology (Specimen -[id number]) demonstrating Nuclear Grade 3 ER+ (11-20%, weak) DCIS with central comedonecrosis, with less than 1 mm of invasive disease noted within the specimen.     On 2/15/2023 she underwent seed-localized lumpectomy and SLN biopsy by Dr. Diggs, with pathology showing IDC grade 2, 2.5mm in greatest extent with associated DCIS, intermediate grade measuring up to 2.0 cm.    On 2/24/2023 she had bilateral reduction mammoplasty by Dr. Arnold,  with pathology negative for atypia or malignancy.     Breast Cancer Risk Factors:   Breast biopsies: 2 breast biopsies on the right breast that demonstrated fibroadenomas  OCPs: duration of 10 years in college  Menarche: around 7th grade, estimated at 15 years old  Gestational history: 3 pregnancies, 2 live births at 30 years and 32 years, 1  at 20 years  Currently using Mirena IUD for significant bleeding from fibroids. Estimated LMP is 2023  No first degree relatives with breast or ovarian cancer.  She does have a family history of a maternal great aunt and maternal first cousin with breast cancer.     Menstrual history: Menarche age 15.  First pregnancy age 20.  Ended in .  Pregnancies age 30 and 32 with live births.  She has fibroids has a Mirena IUD with light periods.  She did have a history of heavy menstrual bleeding related to the fibroids, before the Mirena IUD was placed.  No history of hormone replacement therapy.     PMH:   Attention deficit disorder  Allergies  Migraines  Fibroids  The patient does not have a history of low bone density, diabetes mellitus, hyperlipidemia, or hypertension     She has no history of breast surgery, breast cancer in the past, radiation therapy in the past, radiation exposure in the past, tumor of any kind, heart problems, heart attack, breathing problems, blood clots, seizures, arthritis, peptic ulcer disease, osteoporosis, bone fractures.  She is not currently participating in a clinical trial.     PSH:  Intrauterine device insertion     MEDICATIONS:  Cetirizine  Cyclobenzaprine  Fremanezumab-vfrm (Ajovy)  Methylphenidate  Sumatriptan  Tretinoin  Adderall  Rimegepant  Epinephrine 0.3 mg/0.3 mL     ALLERGY:  Bee pollen  Walnuts  Doxycycline  Sulfa  Wasps        Dog hair  - She follows with Dr. Rufus Cardenas at [facility]     FAMILY HISTORY:  Mother with skin cancer on the leg at 65 years  Father with prostate cancer at 62 years  old, currently alive, no recurrence [id number]. Lilia 3+4.  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   She has two adult children, 20 and 22 years old  Occasional alcohol use  Never smoker  Does not use any other illicits     ECOG PERFORMANCE STATUS: 0     HISTORY OF RADIATION: No  IMPLANTED CARDIAC DEVICE: No  PREGNANCY RISK: She does currently still have regular menstrual periods, but is using a Mirena IUD and has light periods. The last noted menstrual cycle completed on January 22, 2023.      GENETICS:  NF1 c.1586T>C ([id number]) possibly mosaic Uncertain Significance     PATHOLOGY:  A. LEFT BREAST, SEED-LOCALIZED LUMPECTOMY:  - INVASIVE DUCTAL CARCINOMA, [place] grade 2, measuring 2.5 mm in greatest extent.  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  - Margins are negative for invasive carcinoma; closest uninvolved anterior margin is 6 mm.  - Margins are negative for DCIS; closest uninvolved inferior and medial margins are 1.5 mm.  - AJCC pathologic staging is pT1a pN0(sn).  - Biopsy marker and biopsy site changes present.  - Fibrocystic change and columnar cell change with associated calcifications.  - Invasive carcinoma is ER (60%, weak) and SD (30%, moderate) positive, HER2 IHC is pending and the result will be reported in an  addendum.     B. LEFT BREAST, NEW ANTERIOR MARGIN, EXCISION:  - Benign breast tissue with fibrocystic change, sclerosis adenosis and associated calcifications.  - Negative for atypia or malignancy.     C. LEFT BREAST, NEW LATERAL MARGIN, EXCISION:  - Benign breast tissue with fibrocystic change.  - Negative for atypia or malignancy.     D. LEFT BREAST, NEW MEDIAL MARGIN, EXCISION:  - Benign breast tissue with fibrocystic change, columnar cell change and associated calcifications.  - Negative for atypia or malignancy.     E. SENTINEL LYMPH NODE, LEFT AXILLA, BIOPSY #1:  - One lymph node, negative for malignancy (0/1).     F. SENTINEL LYMPH NODE, LEFT AXILLA, BIOPSY #2:  - One lymph node, negative for malignancy (0/1).     TREATMENT HISTORY:  A. Diagnosis with [id number] invasive ductal cancer of the left breast.  - Invasive carcinoma is ER (60%, weak) and VT (30%, moderate) positive,  HER2 negative 1+  B. Mirana IUD removed.  C. Lumpectomy  D. Bilateral mammoplasty breast reduction.  Follow up with Dr. Arnold.  E. Radiation  Treatment Summary:  Radiation Oncology - Course: 1         Protocol:   Treatment Site            Current Dose   Modality           From    To        Elapsed Days  Fx.  1 Left Breast     4,240 cGy       06 X      4/03/2023        4/28/2023        25       16                                                Dose per Fraction: 265 cGy   Total Dose:      4240 cGy  F. Hormonal therapy with tamoxifen planned.         COVID-19 vaccination  - She has had her last (4th) booster in November 2022.      INTERVAL HISTORY:    She is here today unaccompanied and feeling well overall.  -She went [place] and had [place] good trip.  -Wearing lymphedema sleeve and feels her swelling is waxing and waning.  Rx written for her to get new ones.  She will follow-up with Mayi.   -She has been taking an over the counter diuretic Pamabrom 50 mg approximately 4-8 times per months.  -Working closely with her  allergist Dr Olmos.  Currently has a sinus infection being treated.   -No chest pain, no swelling that does not resolve.   -Body pump classes and dance classes and cardio-step classes.   -LMP January 17th, one New Years Leila that was light.  This is new for her.  She typically has been q 28 days. Her last OB/GYN retired - she thinks her last was in 2023, needs PAP - last maybe in 2023 at outside facility.   -No alcohol intake.   -Still tolerating the tamoxifen well overall.  -Sometimes she does get really warm but no overt hot flashes.   -Tennis elbow is still there a little bit.     -Did have one episode of oral thrush - likely related to her /inhaler.  She has been consistently cleaning/rinsing after use.  -Still having periods but not as heavy.  -Daughter was tested for NF1 mutation and was negative.  Her son has not yet as he was out of the country.   -Tennis elbow likely from pickle ball on the right.   -Still some other joint pains in hip, wrist, hands but she thinks better overall.  -Tamoxifen 5 mg still and tolerating this well.  -Denies hot flashes.  -Some issues with her asthma and her allergist has recommended a chest x-ray which she had and PCP follow-up.  She needs to establish with a PC and referral was placed at our last visit.      REVIEW OF SYSTEMS: A 10 point review of systems was negative.    PHYSICAL EXAM:  /62 (BP Location: Right arm, Patient Position: Sitting, Cuff Size: Adult Regular)   Pulse 79   Temp 97.9  F (36.6  C) (Oral)   Resp 16   Wt 77.9 kg (171 lb 11.2 oz)   SpO2 98%   BMI 28.75 kg/m    General: She appears generally well.  Lymph: No cervical supraclavicular subclavicular or axillary lymphadenopathy.  Lungs: Clear to auscultation bilaterally.  Heart: Regular rate and rhythm S1-S2.  Abdomen: Soft nontender without hepatosplenomegaly.  Extremities: Without edema.  Mood and affect were normal.  Breast: Post-left lumpectomy and bilateral reduction mammoplasty.   Incisions well-healed with no underlying nodularity.  No overlying skin changes or erythema.  No masses noted.    LABS::  Most Recent 3 CBC's:  Recent Labs   Lab Test 02/03/25  1435 11/19/24  1339 08/07/24  1020   WBC 6.1 4.8 4.9   HGB 13.1 13.0 13.1   MCV 84 88 87    219 216   ANEUTAUTO 4.3 2.9 3.0     Most Recent 3 BMP's:  Recent Labs   Lab Test 02/03/25  1435 11/19/24  1339 08/07/24  1020    141 142   POTASSIUM 3.7 3.8 3.5   CHLORIDE 107 107 106   CO2 27 26 27   BUN 14.8 13.1 14.5   CR 0.66 0.67 0.62   ANIONGAP 9 8 9   NATHANAEL 8.8 9.0 8.8   GLC 88 87 71   PROTTOTAL 7.0 6.9 6.8   ALBUMIN 4.0 4.0 3.9    Most Recent 3 LFT's:  Recent Labs   Lab Test 02/03/25  1435 11/19/24  1339 08/07/24  1020   AST 20 19 19   ALT 14 13 11   ALKPHOS 45 46 46   BILITOTAL <0.2 0.2 0.3     I reviewed the above labs today.    IMAGING:  No new imaging to review today.  Mammogram results from 11/19/2024 reviewed.   Due for mammogram in May 2025.     OTHER DATA:   Oncology notes reviewed today prior to visit.     ASSESSMENT AND PLAN:   Philippe Tom is a 54 year old woman with a diagnosis of a left breast nuclear grade 3 DCIS with a microinvasive component 2.5mm  -Post-left lumpectomy, bilateral mammoplasty reduction, and radiation.  -She continues on tamoxifen 5mg and is tolerating this well overall.   -Her periods have been monthly, and her flow is much less.  -She will having imaging every 6 months alternating MRI with mammogram, MRI done in May and mammogram was done in November.   -Orders for May 2025 MRI placed today.     Bone Health:  -150 minutes/week based on the lace and pathway study.  -VItaming D3 and Calcium.    NF1 gene:  -She has met with the specialist (Feb 2024).  -MRI brain done Feb 2024 with no concerning findings.  -Message to specialist per patient request regarding further testing on her tumor tissue.   -She was advised to call immediately if she feels any new lumps, bumps or pain in the area where she  received radiation.  I do review this with her again today, and she is encouraged to call clinic with any concerns.  She states understanding of and agreement with this plan.    Follow up:  As scheduled with Dr. Judd in May 2025 with labs, breast MRI.  She will call sooner with any concerns.      JOVANI Rowan, CNP  [facility] [address] [place] [address]  [phone]    *** minutes spent on the date of the encounter doing chart review, review of outside records, review of test results, interpretation of tests, patient visit, and documentation and communication with team/NF specialist via message.

## 2025-02-03 NOTE — NURSING NOTE
Chief Complaint   Patient presents with    Labs Only     Venipuncture labs     Labs drawn via venipuncture without incident. Vitals checked and pt checked in for next appt.     Lisseth Negron RN

## 2025-02-28 PROCEDURE — G0145 SCR C/V CYTO,THINLAYER,RESCR: HCPCS | Performed by: FAMILY MEDICINE

## 2025-02-28 PROCEDURE — G0124 SCREEN C/V THIN LAYER BY MD: HCPCS | Performed by: PATHOLOGY

## 2025-02-28 PROCEDURE — 87624 HPV HI-RISK TYP POOLED RSLT: CPT | Performed by: FAMILY MEDICINE

## 2025-02-28 PROCEDURE — 99000 SPECIMEN HANDLING OFFICE-LAB: CPT | Performed by: PATHOLOGY

## 2025-03-06 ENCOUNTER — OFFICE VISIT (OUTPATIENT)
Dept: OBGYN | Facility: CLINIC | Age: 55
End: 2025-03-06
Attending: STUDENT IN AN ORGANIZED HEALTH CARE EDUCATION/TRAINING PROGRAM
Payer: MEDICAID

## 2025-03-06 VITALS
BODY MASS INDEX: 27.14 KG/M2 | DIASTOLIC BLOOD PRESSURE: 87 MMHG | SYSTOLIC BLOOD PRESSURE: 132 MMHG | HEIGHT: 64 IN | WEIGHT: 159 LBS | HEART RATE: 76 BPM

## 2025-03-06 DIAGNOSIS — Z98.890 STATUS POST COLPOSCOPY: ICD-10-CM

## 2025-03-06 DIAGNOSIS — N92.1 METRORRHAGIA: ICD-10-CM

## 2025-03-06 DIAGNOSIS — R87.613 HIGH GRADE SQUAMOUS INTRAEPITHELIAL LESION (HGSIL) ON CYTOLOGIC SMEAR OF CERVIX: ICD-10-CM

## 2025-03-06 DIAGNOSIS — C50.412 MALIGNANT NEOPLASM OF UPPER-OUTER QUADRANT OF LEFT BREAST IN FEMALE, ESTROGEN RECEPTOR POSITIVE (H): ICD-10-CM

## 2025-03-06 DIAGNOSIS — Z17.0 MALIGNANT NEOPLASM OF UPPER-OUTER QUADRANT OF LEFT BREAST IN FEMALE, ESTROGEN RECEPTOR POSITIVE (H): ICD-10-CM

## 2025-03-06 DIAGNOSIS — R87.613 HSIL ON PAP SMEAR OF CERVIX: Primary | ICD-10-CM

## 2025-03-06 PROCEDURE — 250N000013 HC RX MED GY IP 250 OP 250 PS 637: Performed by: STUDENT IN AN ORGANIZED HEALTH CARE EDUCATION/TRAINING PROGRAM

## 2025-03-06 PROCEDURE — 57454 BX/CURETT OF CERVIX W/SCOPE: CPT | Performed by: STUDENT IN AN ORGANIZED HEALTH CARE EDUCATION/TRAINING PROGRAM

## 2025-03-06 PROCEDURE — 58100 BIOPSY OF UTERUS LINING: CPT | Performed by: STUDENT IN AN ORGANIZED HEALTH CARE EDUCATION/TRAINING PROGRAM

## 2025-03-06 RX ORDER — PHENAZOPYRIDINE HYDROCHLORIDE 100 MG/1
200 TABLET, FILM COATED ORAL ONCE
OUTPATIENT
Start: 2025-03-06 | End: 2025-03-06

## 2025-03-06 RX ORDER — IBUPROFEN 200 MG
600 TABLET ORAL ONCE
Status: COMPLETED | OUTPATIENT
Start: 2025-03-06 | End: 2025-03-06

## 2025-03-06 RX ORDER — METRONIDAZOLE 500 MG/100ML
500 INJECTION, SOLUTION INTRAVENOUS
OUTPATIENT
Start: 2025-03-06

## 2025-03-06 RX ORDER — ACETAMINOPHEN 325 MG/1
975 TABLET ORAL ONCE
OUTPATIENT
Start: 2025-03-06 | End: 2025-03-06

## 2025-03-06 RX ADMIN — IBUPROFEN 600 MG: 200 TABLET, FILM COATED ORAL at 11:27

## 2025-03-06 NOTE — PATIENT INSTRUCTIONS
Thank you for trusting us with your care!   Please be aware, if you are on Mychart, you may see your results prior to your providers review. If labs are abnormal, we will call or message you on Mychart with a follow up plan.    If you need to contact us for questions about:  Symptoms, Scheduling & Medical Questions; Non-urgent (2-3 day response) [employer] message, Urgent (needing response today) [phone] (if after 3:30pm next day response)   Prescriptions: Please call your Pharmacy   Billing: [facility] [phone] or [facility]:[phone]      Patient Education   Colposcopy: What to Expect at Home  Your Recovery  You may feel some soreness in your vagina for a day or two if you had a biopsy. Some vaginal bleeding or discharge for about a week after a biopsy is normal. The discharge may be dark-colored. You may also have some spotting for about 3 weeks. You can use a sanitary pad for the bleeding.  It may take a week or two for you to get the test results.  This care sheet gives you a general idea about how long it will take for you to recover. But each person recovers at a different pace. Follow the steps below to feel better as quickly as possible.  How can you care for yourself at home?  Activity       You can return to work and most daily activities right after the test.   Exercise       Do not exercise for 1 day after the test.   Medicines       Your doctor will tell you if and when you can restart your medicines. You will also get instructions about taking any new medicines.        If you stopped taking aspirin or some other blood thinner, your doctor will tell you when to start taking it again.        Take an over-the-counter pain medicine, such as acetaminophen (Tylenol), ibuprofen (Advil, Motrin), or naproxen (Aleve). Be safe with medicines. Read and follow all instructions on the label. Do not take two or more pain medicines at the same time unless the doctor told you to. Many pain medicines have  "acetaminophen, which is Tylenol. Too much acetaminophen (Tylenol) can be harmful.   Other instructions       Some vaginal bleeding or discharge after a biopsy is normal. The discharge may be dark-colored. Use a pad if you have some bleeding.        If you had a biopsy, do not have vaginal sex or place anything in your vagina for 1 week or until your doctor tells you it is okay. Do not douche.        You can take a bath or shower anytime after the test.   Follow-up care is a key part of your treatment and safety. Be sure to make and go to all appointments, and call your doctor if you are having problems. It's also a good idea to know your test results and keep a list of the medicines you take.  When should you call for help?   Call your doctor now or seek immediate medical care if:       You have severe vaginal bleeding. This means that you are soaking through your usual pads or tampons each hour for 2 or more hours.        You have pain that does not get better after you take pain medicine.        You have signs of infection, such as:  Increased pain.  Bad-smelling vaginal discharge.  A fever.   Watch closely for any changes in your health, and be sure to contact your doctor if:       You have questions or concerns.   Where can you learn more?  Go to https://www.[employer].net/patiented  Enter M523 in the search box to learn more about \"Colposcopy: What to Expect at Home.\"  Current as of: April 30, 2024  Content Version: 14.3    2024 [employer].   Care instructions adapted under license by your healthcare professional. If you have questions about a medical condition or this instruction, always ask your healthcare professional. [employer] disclaims any warranty or liability for your use of this information.       Colposcopy Post-Procedure Patient Instructions      You may experience any of the following for a couple of days following colposcopy:  Mild cramping  Vaginal bleeding   Vaginal " discharge that looks black and clumpy    Please call your healthcare provider if you have any of the following symptoms:  Fever--Temperature greater than 100 degrees  Cramping after 48 hours  Bleeding heavier than a normal period in the first 24-48 hours  If you are bleeding and soaking more than one pad an hour  Or any other worrisome problems.    We recommend that:  You use pads, not tampons for the bleeding.  You may resume sexual activity in 2-3 days, or after bleeding stops.  You may use Tylenol or ibuprofen (Motrin or Advil) for any discomfort.

## 2025-03-06 NOTE — PROGRESS NOTES
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    She had an episode of clear discharge on , has not recurred. No itching/burning.    Has continued to have menses, overall regular but in the last 3 months has had a lot of breakthrough bleeding.    Of note, she had planned for a risk-reducing TLH, BS shorlty after her breast cancer diagnosis, but decided against this at the time. Now is leaning towards this again.    Cervical Cancer Risk Factors:  HPV vaccination status: did not have  Smoking: never  Immunosuppression: s/p chemotherapy. On tamoxifen    Cervical Cancer Screening History:  2011: Pap test NILM  2012: Pap test NILM  2015: Pap test NILM, hrHPV negative  2019: Pap test NILM, hrHPV OTHER positive  2021: Pap test NILM, hrHPV negative  2025: Pap test HSIL, hrHPV negative      Physical Exam:   /87   Pulse 76   Ht 1.626 m (5' 4\")   Wt 72.1 kg (159 lb)   LMP 2025 (Exact Date)   BMI 27.29 kg/m    Gen: Well-appearing, NAD  HEENT: Normocephalic, atraumatic  CV:        Well perfused; no LE edema    Genitourinary: External genitalia and urethral meatus appears normal.  Vagina is smooth without nodularity or masses.  Cervix appears normal and without lesions.      Verbal consent provided by the patient prior to pelvic exam.  Each step of the exam was verbalized throughout.  Present for the exam: myself    Procedure Risk Statement:   The patient was counseled regarding risks, benefits and alternatives to colposcopy, cervical biopsies, endocervical curettage, and EMB.  Risks discussed include but not limited to:  Bleeding; infection; injury to adjacent organs; inadequate sample or false negative result.  The patient's questions " were answered, and informed consent signed.       Procedure:   Colposcopy:  After the informed consent was signed and time-out procedure was performed, dilute acetic acid was applied to the cervix. Colposcopy performed. After colposcopy, cervix swabbed with betadine x3, tenaculum placed and EMB performed. Tenaculum sites made hemostatic with silver nitrate.  - Squamocolumnar junction fully visualized? no  - Acetowhite changes? Yes, circumferentially surrounding external os   - Punctations, mosaicism, abnormal vasculature, other abnormalities? Yes, mosaicism noted at 12 o'clock and 6 o'clock  - Clinical Impression: HSIL  - Specimens: cervical biopsies collected at 12, 3, 6, and 9 o'lcock. ECC collected, EMB collected    Assessment/Plan:  Philippe Medellin is a 54 year old  patient with a HSIL noted on pap smear    Abnormal Pap  - Gardasil vaccine given today. We discussed that data shows possible evidence of regression of HSIL with vaccination. Discussed insurance may not cover this given age, patient would like to complete series.   - Biopsies sent to pathology, results via [employer] if normal, will call if requires treatment  - Patient advised to contact clinic with heavy vaginal bleeding, fever over 101 degrees F, or any other concerns    AUB  Hx breast cancer on tamoxifen  - EMB collected today  - Recommend repeat pelvic US to evaluate fibroid size  - Patient would prefer to proceed with risk-reducing TLH, BS. Prefers to keep ovaries. Will discuss this in more detail at next visit due to time constraints today. Case request placed.    Yasmin Greene MD  [facility], Ob/Gyn  2025 8:57 PM

## 2025-03-06 NOTE — LETTER
"3/6/2025       RE: Philippe Medellin  [address]  [facility] [address]     Dear Colleague,    Thank you for referring your patient, Philippe Medellin, to the [facility] [place] [facility]. Please see a copy of my visit note below.    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    She had an episode of clear discharge on , has not recurred. No itching/burning.    Has continued to have menses, overall regular but in the last 3 months has had a lot of breakthrough bleeding.    Of note, she had planned for a risk-reducing TLH, LAUREANO rogers after her breast cancer diagnosis, but decided against this at the time. Now is leaning towards this again.    Cervical Cancer Risk Factors:  HPV vaccination status: did not have  Smoking: never  Immunosuppression: s/p chemotherapy. On tamoxifen    Cervical Cancer Screening History:  2011: Pap test NILM  2012: Pap test NILM  2015: Pap test NILM, hrHPV negative  2019: Pap test NILM, hrHPV OTHER positive  2021: Pap test NILM, hrHPV negative  2025: Pap test HSIL, hrHPV negative      Physical Exam:   /87   Pulse 76   Ht 1.626 m (5' 4\")   Wt 72.1 kg (159 lb)   LMP 2025 (Exact Date)   BMI 27.29 kg/m    Gen: Well-appearing, NAD  HEENT: Normocephalic, atraumatic  CV:        Well perfused; no LE edema    Genitourinary: External genitalia and urethral meatus appears normal.  Vagina is smooth without nodularity or masses.  Cervix appears normal and without lesions.      Verbal consent provided by the patient prior to pelvic exam.  Each step of the exam was verbalized throughout.  Present for the exam: " myself    Procedure Risk Statement:   The patient was counseled regarding risks, benefits and alternatives to colposcopy, cervical biopsies, endocervical curettage, and EMB.  Risks discussed include but not limited to:  Bleeding; infection; injury to adjacent organs; inadequate sample or false negative result.  The patient's questions were answered, and informed consent signed.       Procedure:   Colposcopy:  After the informed consent was signed and time-out procedure was performed, dilute acetic acid was applied to the cervix. Colposcopy performed. After colposcopy, cervix swabbed with betadine x3, tenaculum placed and EMB performed. Tenaculum sites made hemostatic with silver nitrate.  - Squamocolumnar junction fully visualized? no  - Acetowhite changes? Yes, circumferentially surrounding external os   - Punctations, mosaicism, abnormal vasculature, other abnormalities? Yes, mosaicism noted at 12 o'clock and 6 o'clock  - Clinical Impression: HSIL  - Specimens: cervical biopsies collected at 12, 3, 6, and 9 o'lcock. ECC collected, EMB collected    Assessment/Plan:  Philippecammie Medellin is a 54 year old  patient with a HSIL noted on pap smear    Abnormal Pap  - Gardasil vaccine given today. We discussed that data shows possible evidence of regression of HSIL with vaccination. Discussed insurance may not cover this given age, patient would like to complete series.   - Biopsies sent to pathology, results via [employer] if normal, will call if requires treatment  - Patient advised to contact clinic with heavy vaginal bleeding, fever over 101 degrees F, or any other concerns    AUB  Hx breast cancer on tamoxifen  - EMB collected today  - Recommend repeat pelvic US to evaluate fibroid size  - Patient would prefer to proceed with risk-reducing TLH, BS. Prefers to keep ovaries. Will discuss this in more detail at next visit due to time constraints today. Case request placed.    Yasmin Greene MD  [facility], [facility]  03/06/2025 8:57 PM          Again, thank you for allowing me to participate in the care of your patient.      Sincerely,    Yasmin Greene MD

## 2025-03-10 ENCOUNTER — ANCILLARY PROCEDURE (OUTPATIENT)
Dept: ULTRASOUND IMAGING | Facility: CLINIC | Age: 55
End: 2025-03-10
Attending: STUDENT IN AN ORGANIZED HEALTH CARE EDUCATION/TRAINING PROGRAM
Payer: MEDICAID

## 2025-03-10 DIAGNOSIS — N92.1 METRORRHAGIA: ICD-10-CM

## 2025-03-10 PROCEDURE — 76856 US EXAM PELVIC COMPLETE: CPT

## 2025-03-10 PROCEDURE — 76856 US EXAM PELVIC COMPLETE: CPT | Mod: 26 | Performed by: OBSTETRICS & GYNECOLOGY

## 2025-03-10 PROCEDURE — 76830 TRANSVAGINAL US NON-OB: CPT | Mod: 26 | Performed by: OBSTETRICS & GYNECOLOGY

## 2025-03-12 ENCOUNTER — PATIENT OUTREACH (OUTPATIENT)
Dept: OBGYN | Facility: CLINIC | Age: 55
End: 2025-03-12
Payer: MEDICAID

## 2025-03-17 ENCOUNTER — TELEPHONE (OUTPATIENT)
Dept: OBGYN | Facility: CLINIC | Age: 55
End: 2025-03-17
Payer: MEDICAID

## 2025-03-20 ENCOUNTER — TRANSCRIBE ORDERS (OUTPATIENT)
Dept: ONCOLOGY | Facility: CLINIC | Age: 55
End: 2025-03-20
Payer: MEDICAID

## 2025-03-20 DIAGNOSIS — Z13.79 GENETIC TESTING: Primary | ICD-10-CM

## 2025-03-25 ENCOUNTER — TELEPHONE (OUTPATIENT)
Dept: CONSULT | Facility: CLINIC | Age: 55
End: 2025-03-25
Payer: MEDICAID

## 2025-03-25 ENCOUNTER — VIRTUAL VISIT (OUTPATIENT)
Dept: CONSULT | Facility: CLINIC | Age: 55
End: 2025-03-25
Payer: MEDICAID

## 2025-03-25 ENCOUNTER — TELEPHONE (OUTPATIENT)
Dept: OBGYN | Facility: CLINIC | Age: 55
End: 2025-03-25
Payer: MEDICAID

## 2025-03-25 DIAGNOSIS — Z15.89 MONOALLELIC MUTATION OF NF1 GENE: ICD-10-CM

## 2025-03-25 DIAGNOSIS — Z17.0 MALIGNANT NEOPLASM OF UPPER-OUTER QUADRANT OF LEFT BREAST IN FEMALE, ESTROGEN RECEPTOR POSITIVE (H): Primary | ICD-10-CM

## 2025-03-25 DIAGNOSIS — C50.412 MALIGNANT NEOPLASM OF UPPER-OUTER QUADRANT OF LEFT BREAST IN FEMALE, ESTROGEN RECEPTOR POSITIVE (H): Primary | ICD-10-CM

## 2025-03-25 PROCEDURE — 96041 GENETIC COUNSELING SVC EA 30: CPT | Mod: GT,95

## 2025-03-25 NOTE — PROGRESS NOTES
Name:  Philippe Sparrow  :   1970  MRN:   [id number]  Date of service: Mar 25, 2025  Referring Provider: No ref. 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  This consultation was requested by her care team in Oncology. Philippe attended this in-person visit alone.    I met with her at the request of her team to discuss personal and family medical history, review possible genetic contributions to her symptoms, and to obtain informed consent for genetic testing, if indicated. History is obtained from Philippe and the medical record.       -----------------------------------------    Plan  At today's visit, we discussed the following plan:   1. High-resolution genetic testing ordered at today's visit via [facility]. I will begin a prior authorization for her testing; I will reach out via [employer] when result of prior authorization are available.  2. We will also want to organize testing of a tumor-based sample. This will further clarify the nature/involvement of NF1 within Philippe's breast tumor.   2. Philippe will have a blood draw and testing will be sent to [facility]. When ready, I will share results with both the patient herself and her care team.      ------------------------------------------     Personal History  For additional details, review notes from patient's previous visits to Oncology.  To summarize, Philippe has a history of the following:    Patient Active Problem List   Diagnosis    ADD (attention deficit disorder with hyperactivity)    CARDIOVASCULAR SCREENING; LDL GOAL LESS THAN 160    HSIL (high grade squamous intraepithelial lesion) on Pap smear of cervix    Malignant neoplasm of upper-outer quadrant  of left breast in female, estrogen receptor positive (H)     Past Medical History:   Diagnosis Date    ADD (attention deficit disorder)     diagnosed 1998     Allergies     had allergy testing as a child    Asthma     Breast cancer (H) 01/2023    Migraine        Height: 162.6 cm   Weight: 72.1 kg     Craniofacial: Negative for all craniofacial concerns.  Neuro: breast neoplasm(s)  Psych: ADHD  Optho: Has not yet been evaluated by ophthalmology.   Cardio: Negative for cardiac features.  MSK: Negative for all musculoskeletal features.  Derm: cafe-au-lait macules (CALMs) (vs. Freckles, uncertain)  Heme: Negative for all hematological concerns.    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 She is  and has two adult children.    Previous Genetic Testing  Philippe had genetic testing via a cancer panel at [facility]. 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     Family History  A standard three generation pedigree was obtained at a previous visit to an outside genetics service and is scanned into the medical record.      Children: Philippe's daughter, Susana, was tested for Philippe's NF1 variant and is NEGATIVE. She is otherwise well. Philippe's son, Kashmir, is scheduled to be seen for getting testing for his mother's variant soon.  Siblings: Philippe has a sister, Linette, with a recent history  Full siblings: ***  Paternal half siblings: ***  Maternal half siblings: ***  Paternal:  Father: ***. Paternal height is ***.   Paternal grandfather: ***  Paternal grandmother: ***  Paternal relatives: ***    Maternal:  Mother:  ***. Maternal height is ***.  Maternal grandfather: ***  Maternal grandmother: ***  Maternal relatives: ***      There are no other reported instances of ***axillary or inguinal freckling, scoliosis, cutaneous abnormalities, learning concerns or intellectual disability, macrocephaly, cancers or tumors, osseous lesions, movement or  "balance concerns, hearing loss, short stature, hypertension, or cardiac problems. ***Consanguinity is denied. The family history is **{POSITIVE/NEGATIVE:215555} for features suggestive of congenital mismatch repair disorder (CMMRD).     ***CMMRD CANCER ASSESSMENT  Colon  Rectum  Brain (glioma, glioblastoma)  Blood (non-Hodgkin lymphoma/white blood cell)  Small intestine  Urinary tract  Uterus   Childhood onset  Cafe au lait spots, hypopigmented spots, RARE lisch nodule    Background Information  Every cell in our body contains a complete set of the instructions that our body needs to function.  These instructions come in the form of our DNA, or long, double-stranded chains of chemical compounds. Portions of DNA that code for a specific product or have a known function are called genes.       Since there's so much information that goes into human functioning and since every tiny cell needs a complete set, our DNA is tightly wound into compact structures called chromosomes. Most people have 46 chromosomes, with 23 coming from each parent. The 23rd pair are sometimes referred to as the \"sex chromosomes\", as they typically determine biological sex development (XX and XY). Sometimes, changes to chromosomes (like deleted pieces, duplicated pieces, or entire extra chromosomes) can cause genetic instructions to no longer work. When this occurs, a genetic disorder is possible. This type of change is called a copy number variant, because a person would not have the expected number (two) of copies of a gene.     Genetic disorders can also be caused by a single change in a single gene, like a typo in a sentence. These may be called point mutations, missense variants, or nonsense variants; the name usually depends on the effect the change has on the body's instructions. The genetic disorders caused by this type of change are often called single gene disorders.     Genetic changes can come from either parent or be brand new in a " "person. When a change is brand new in an individual, it's called a de lillian change. For some conditions, both copies of a gene (both the one from mother and the one from father) need to be altered to show a trait or disease. This is called autosomal recessive inheritance. Other conditions just require one copy of a gene to be changed in order to show a trait or disease. This is called autosomal dominant inheritance.     Differential for CALMs  Cafe-au-lait macules (CALMs) are common hyperpigmented and flat skin lesions found in the general population. They are usually present at birth (congenital) or occur early in life. They may grow in number and size with age. The color varies from light brown to dark brown, and they may be present on any body parts, but the most common location is the trunk and the extremities. The term cafe-au-lait is a Egyptian word meaning \"coffee with milk.\"     There are two main types of CALMs. CALMs with regular and clearly demarcated margins (\"[place]\"), which is more common. They range in size from a few millimeters to several centimeters (>20cm) and may be present as solitary or multiple spots. The second type of CALM has an irregular margin (\"[place]\"), which is less common and is usually larger and solitary. The \"[place]\" pattern can be seen in a segmental pigmentary disorder or other non-NF1 genetic conditions, while the \"[place]\" pattern is seen neurofibromatosis 1 (NF1) and related conditions.     CALMs are seen in 95% of patients with NF1. Although solitary CALMs are common in the general population and are familial and inherited as autosomal dominant, multiple spots accompanied by other manifestations may indicate an underlying genetic disorder, and they require further evaluation. A family history of CALMs should be evaluated in any patient with multiple CALMs.     Genetic causes of CALMs include:  Neurofibromatosis (NF1, NF2)  Hernandez Complex " (ZGBVA3Q)  PJS (STK11)  Legius syndrome (SPRED1)  Rice syndrome  Williston syndrome with multiple lentigines (formerly known as LEOPARD syndrome).    Neurofibromatosis Type 1  Neurofibromatosis type 1 (NF1, sometimes called von Recklinghausen's disease) is a progressive, multisystem genetic disorder affecting about 1 in 3000 individuals. Neurofibromatosis type 1 is caused by harmful changes (often called variants or mutations) in the gene NF1. In essence, the NF1 gene gives instructions for the production of a protein called neurofibromin, which plays an important role controlling cell proliferation. When a harmful change in the NF1 gene is present, the neurofibromin protein that is produced from that instruction is less effective or non-functional. Cell division is then less well-regulated, leading to tumors.     The [place] of Health (NIH) developed clinical diagnostic criteria for NF1. A clinical diagnosis of NF1 is made in an individual who has two or more of the following features:     Six or more cafe-au-lait spots/macules (CALs or CALMs)   CALS must be over 5 mm at their widest spot (if patient is pre-puberty) and over 15 mm at their widest spot (if patient has passed puberty)  Patterns of freckling in places that the skin folds  Typically axillary (armpit), submammary (under breast), or inguinal (groin) regions   2+ neurofibromas of any type or at least one plexiform neurofibroma   Optic pathway glioma   A slow-growing brain tumor that originates around the optic nerve, which conducts information from the eye to the brain  Typically found in young children (up to age 10)  Features: vision loss/impairment, unusual prominence of the eyeball (proptosis), and hormonal problems (like weight loss/gain, unusual growth)  Two or more iris Lisch nodules or two or more choroidal abnormalities   Lisch nodules = Melanocytic hamartomas of the eye.   Pigment-producing cells (melanocytes) overgrow, causing extra  "tissue matching the tissue around it (hamartoma)  Identified by slit lamp examination   Choroidal abnormalities = bright, patchy nodules within the eye   Identified by GEORGE or OCT imaging   A specific kind of bone damage (may be called \"osseous lesions\"). Examples include:  Sphenoid dysplasia, a problem with craniofacial bone development  Anterolateral bowing of the tibia  Pseudarthrosis of a long bone  Scoliosis  A heterozygous pathogenic NF1 variant with a variant allele fraction of 50% in apparently normal tissue such as white blood cells  A first degree relative (parent, sibling or offspring) with NF1 as defined by the above criteria     A clinical diagnosis of NF1 is based on whether someone exhibits two or more of these features, while a molecular diagnosis is based on genetic testing. Even if no variant is found in the NF1 gene with genetic testing, a person may still have a clinical diagnosis based on their features. Around 5% of individuals with NF1 do not have a genetic change that we can identify or locate with current technology.     Although the penetrance of NF1 is essentially complete, the clinical manifestations are extremely variable. This variation occurs even between family members with the exact same genetic change. Multiple café au lait spots occur in nearly all patients (>90%) and intertriginous freckling develops in almost 90%. Benign cutaneous or subcutaneous neurofibromas are usually present in adults with NF1. Plexiform neurofibromas are less common but can cause disfigurement and may compromise function or even jeopardize life. Most plexiform neurofibromas are internal, asymptomatic, and not suspected on physical examination.     Ocular manifestations of NF1 include optic gliomas, which may lead to vision loss. Additionally, throughout childhood an individual will likely develop an increasing number of Lisch nodules, benign iris hamartomas that appear on eye exams. Scoliosis, vertebral " "dysplasia, pseudoarthrosis, and overgrowth are the most serious bony complications of NF1. Other medical concerns include vasculopathy, hypertension, intracranial tumors, and malignant peripheral nerve sheath tumors. About half of people with NF1 have a learning disability, such as visual-spatial deficits.     ***Philippe does not meet diagnostic criteria for NF1 at this time.   ***Philippe meets diagnostic criteria at this time considering the following features:     Segmental  The presence of clinical findings of NF1 that are confined to one or more well-circumscribed regions of the body is referred to as segmental or mosaic NF1.  Typically, these manifestations do not encompass the entire clinical spectrum of NF1 and may include only dermatologic findings and/or neurofibromas.  It is believed that segmental NF1 results from a post-zygotic pathogenic variant, or mutation, in the NF1 gene. This means that a genetic change occurs in one cell when someone is only a few cells big, often just after conception. Any cells that divide out from the altered cell will also carry the mutation, while the cells that divide from an unaffected cell will remain unaffected.    Individuals with segmental NF1 may have germ cells (egg & sperm cells) with the pathogenic variant. Therefore, they can be at risk to have offspring with the mutation in all cells, resulting in classic NF1.  This risk is quoted to be as high as 50%, though it is likely to be considerably lower. Genetic testing is available by skin biopsy of the affected area. We {ARE/ARE NOT:230311} suspicious that Philippe's features are segmental.     Genetic Testing  Genetic testing can take many forms. Typically, with NF1, we specifically look for changes in the NF1 gene (and may include a gene called SPRED1, associated with a related condition called Legius syndrome). We both \"proofread\" the sequence of chemical bases in the gene for mutations (sequence analysis) and look to " see if any parts of the gene are missing or extra (deletion or duplication analysis).     At today's visit, we reviewed genetic testing options available to Philippe, including NF1 analysis. We discussed the details, limitations, and possible outcomes of next generation sequencing for ***NF1 and SPRED1.  There are three types of results:  Negative: meaning no pathogenic variants were identified in the genes that were tested  A negative result does not rule out the possibility that Philippe Mcqueens symptoms are due to a genetic etiology and cannot rule out segmental or mosaic NF  Positive: meaning one (or more) pathogenic variants were identified in the genes that were tested that are associated with Philippe Medellin's symptoms  We discussed that a positive result could provide an etiology to Philippe Mcqueens symptoms, give anticipatory guidance as to their potential progression, and clarify risks to family members.  We also discussed that a positive result could indicate that Philippe Medellin is at risks for other health concerns, outside of their presenting symptoms  Uncertain: meaning one (or more) variants were identified in the genes that were tested, but there is not enough data to know if this variant is disease-causing; these are called variants of uncertain significance (VUS)  In most cases, identification of a VUS does not confirm a diagnosis and does not result in any clinically actionable recommendations.  The variant will be monitored over time to see if more information is known about it in the future.  If a VUS is identified, testing of other relatives may be helpful to provide clarification.    We discussed the potential benefits of genetic testing and why this genetic testing is medically indicated. A positive result will help determine the etiology of ***referringsymptoms noted in Philippe and could guide {hishertheir - lowercase:963085} medical management.  If a genetic  cause is found for Philippe, it will give us a more accurate risk assessment for other family members.  Thus, the recommended testing for Philippe is DIAGNOSTIC testing, and it is NOT investigational.    Philippe expressed an excellent understanding of this information and agreed to the plan as set forth by {[facility]:479709} and I, which is detailed at the beginning of this note (see Plan).     It was a pleasure meeting with Philippe's family today. {HE/SHE/THEY:592045} vocalized understanding of the information we discussed and {hishertheir - lowercase:000186} questions and concerns were addressed. {HE/SHE/THEY:699055} {HAS/HAVE:162619} been provided with my contact information should any questions arise regarding our visit or plan moving forward. In total, I spent *** minutes in face-to-face counseling with this {\[facility]\""ATIENTFAMILY:[id number]}.     Maral Arnold MS, [facility]  Genetic Counselor - [facility]  Phone: [phone]  Email: [email or web address]      References  Nadia PATTERSON. Neurofibromatosis 1. 1998 Oct 2 [Updated 2022 Apr 21]. In: Eleuterio MP, Ziggy GM, Reg RA, et al., editors. GeneReviews  [Internet]. [place] ([place]): [facility], [place]; 1172-2504. Available from: https://www.ncbi.nlm.nih.gov/books/EXL5330/    True MAN, Titus SERNA. Cafe Au Lait [place]. [Updated 2022 Sep 25]. In: [employer] [Internet]. [place] ([place]): [employer]; 2022 Jan-. Available from: https://www.ncbi.nlm.nih.gov/books/NMY361930/    Lab results may be automatically released via [employer].  Department protocol is to hold genetic testing results until we have reviewed them. We will then contact the family directly to disclose the results and ensure they receive a copy of the report. This protocol was reviewed with the family, who were in agreement to hold the results for genetics review and direct contact.    *** minutes spent on the date of the encounter in chart review, patient visit, test coordination/review,  documentation, and/or discussion with other providers about the issues documented above.

## 2025-03-25 NOTE — TELEPHONE ENCOUNTER
I had the pleasure of speaking with Philippe this afternoon regarding her upcoming visit with oncology genetics (originally scheduled for tomorrow). We reviewed that I typically see NF1 genetics cases and that I'd be a better fit for her needs at this time. Philippe agreed to be seen by me in a cancellation spot this afternoon (3 PM) rather than meeting with Laura Moya, [facility] tomorrow. This plan was reviewed with Ms. Moya, who agreed. I have notified the scheduling staff and look forward to speaking with Philippe this afternoon.

## 2025-03-25 NOTE — Clinical Note
3/25/2025      RE: Philippe Medellin  [address] [address]     Dear Colleague,    Thank you for the opportunity to participate in the care of your patient, Philippe Medellin, at the [facility] at [facility]. Please see a copy of my visit note below.    Name:  Philippe Sparrow  :   1970  MRN:   [id number]  Date of service: Mar 25, 2025  Referring Provider: No ref. 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  This consultation was requested by her care team in Oncology. Philippe attended this in-person visit alone.    I met with her at the request of her team to discuss personal and family medical history, review possible genetic contributions to her symptoms, and to obtain informed consent for genetic testing, if indicated. History is obtained from Philippe and the medical record.       -----------------------------------------    Plan  At today's visit, we discussed the following plan:   1. High-resolution genetic testing ordered at today's visit via [place] [facility] [place] [facility]. I will begin a prior authorization for her testing; I will reach out via [employer] when result of prior authorization are available.  2. We will also want to organize testing of a tumor-based sample. This will further clarify the nature/involvement of NF1 within Philippe's breast tumor.   2. Philippe will have a blood draw and testing will be sent to [facility]. When ready, I will share results with both the patient herself and her care team.      ------------------------------------------     Personal History  For additional details, review  "note from ***.  To summarize, Philippe has a history of the following:    Patient Active Problem List   Diagnosis    ADD (attention deficit disorder with hyperactivity)    CARDIOVASCULAR SCREENING; LDL GOAL LESS THAN 160    HSIL (high grade squamous intraepithelial lesion) on Pap smear of cervix    Malignant neoplasm of upper-outer quadrant of left breast in female, estrogen receptor positive (H)     Past Medical History:   Diagnosis Date    ADD (attention deficit disorder)     diagnosed      Allergies     had allergy testing as a child    Asthma     Breast cancer (H) 2023    Migraine        Height: *** {rmhshortstature:334186}  Weight: ***  Head circumference: ***     Craniofacial: {rmhnfcranio:946590}  Neuro: {rmhnfneurofeat:895756}  Psych: {rmhnfpsych:962014}  Optho: {rmhnflisch:909179}  Cardio: {rmhnfcardio:495761}  MSK: {rmhnfmusc:113792}  Derm: {rmhnfderm:457524}  Heme: {rmhnfheme:506332}      Pregnancy/ History  Mother's age: *** years  Father's age: *** years  Philippe was born at *** gestation via {RMHdelmethod:748628} delivery. {His/HerCap:350111} gestation was a {RMHConception:421299::\"spontaneous\"} conception.   Prenatal care ***was received. Prenatal tests included ***. There {rmhpregcomp:296788}.  Exposures and acute maternal illness during pregnancy:  ***  The APGAR scores were ***  Birth weight: 0 lbs 0 oz  Birth length: Data Unavailable  Birth head circumference: ***  Complications in the  period included: ***  ?  ?placental abruption  ?  ?HTN or pre-eclampsia/toxemia    Social History  ***At home  ***Profession/hobbies etc    Father available for testing: {YES/NO:948904}  Mother available for testing: {YES/NO:569820}  Full sibling available for testing: {YES/NO:064218}   Half sibling available for testing: {YES/NO:206318}    Relevant Imaging & Laboratory Workup  ***    Previous Genetic Testing  ***    Services  ***    Family History  A standard three generation " "pedigree was obtained and is scanned into the medical record.      Children: ***  Siblings: ***  Full siblings: ***  Paternal half siblings: ***  Maternal half siblings: ***  Paternal:  Father: ***. Paternal height is ***.   Paternal grandfather: ***  Paternal grandmother: ***  Paternal relatives: ***    Maternal:  Mother:  ***. Maternal height is ***.  Maternal grandfather: ***  Maternal grandmother: ***  Maternal relatives: ***      There are no other reported instances of ***axillary or inguinal freckling, scoliosis, cutaneous abnormalities, learning concerns or intellectual disability, macrocephaly, cancers or tumors, osseous lesions, movement or balance concerns, hearing loss, short stature, hypertension, or cardiac problems. ***Consanguinity is denied. The family history is **{POSITIVE/NEGATIVE:679083} for features suggestive of congenital mismatch repair disorder (CMMRD).     ***CMMRD CANCER ASSESSMENT  Colon  Rectum  Brain (glioma, glioblastoma)  Blood (non-Hodgkin lymphoma/white blood cell)  Small intestine  Urinary tract  Uterus   Childhood onset  Cafe au lait spots, hypopigmented spots, RARE lisch nodule    Background Information  Every cell in our body contains a complete set of the instructions that our body needs to function.  These instructions come in the form of our DNA, or long, double-stranded chains of chemical compounds. Portions of DNA that code for a specific product or have a known function are called genes.       Since there's so much information that goes into human functioning and since every tiny cell needs a complete set, our DNA is tightly wound into compact structures called chromosomes. Most people have 46 chromosomes, with 23 coming from each parent. The 23rd pair are sometimes referred to as the \"sex chromosomes\", as they typically determine biological sex development (XX and XY). Sometimes, changes to chromosomes (like deleted pieces, duplicated pieces, or entire extra chromosomes) " "can cause genetic instructions to no longer work. When this occurs, a genetic disorder is possible. This type of change is called a copy number variant, because a person would not have the expected number (two) of copies of a gene.     Genetic disorders can also be caused by a single change in a single gene, like a typo in a sentence. These may be called point mutations, missense variants, or nonsense variants; the name usually depends on the effect the change has on the body's instructions. The genetic disorders caused by this type of change are often called single gene disorders.     Genetic changes can come from either parent or be brand new in a person. When a change is brand new in an individual, it's called a de lillian change. For some conditions, both copies of a gene (both the one from mother and the one from father) need to be altered to show a trait or disease. This is called autosomal recessive inheritance. Other conditions just require one copy of a gene to be changed in order to show a trait or disease. This is called autosomal dominant inheritance.     Differential for CALMs  Cafe-au-lait macules (CALMs) are common hyperpigmented and flat skin lesions found in the general population. They are usually present at birth (congenital) or occur early in life. They may grow in number and size with age. The color varies from light brown to dark brown, and they may be present on any body parts, but the most common location is the trunk and the extremities. The term cafe-au-lait is a Hungarian word meaning \"coffee with milk.\"     There are two main types of CALMs. CALMs with regular and clearly demarcated margins (\"[facility]\"), which is more common. They range in size from a few millimeters to several centimeters (>20cm) and may be present as solitary or multiple spots. The second type of CALM has an irregular margin (\"[place]\"), which is less common and is usually larger and solitary. The \"coast of " "[place]\" pattern can be seen in a segmental pigmentary disorder or other non-NF1 genetic conditions, while the \"[place]\" pattern is seen neurofibromatosis 1 (NF1) and related conditions.     CALMs are seen in 95% of patients with NF1. Although solitary CALMs are common in the general population and are familial and inherited as autosomal dominant, multiple spots accompanied by other manifestations may indicate an underlying genetic disorder, and they require further evaluation. A family history of CALMs should be evaluated in any patient with multiple CALMs.     Genetic causes of CALMs include:  Neurofibromatosis (NF1, NF2)  Hernandez Complex (TDOSV7E)  PJS (STK11)  Legius syndrome (SPRED1)  Rice syndrome  [place] syndrome with multiple lentigines (formerly known as LEOPARD syndrome).    Neurofibromatosis Type 1  Neurofibromatosis type 1 (NF1, sometimes called von Recklinghausen's disease) is a progressive, multisystem genetic disorder affecting about 1 in 3000 individuals. Neurofibromatosis type 1 is caused by harmful changes (often called variants or mutations) in the gene NF1. In essence, the NF1 gene gives instructions for the production of a protein called neurofibromin, which plays an important role controlling cell proliferation. When a harmful change in the NF1 gene is present, the neurofibromin protein that is produced from that instruction is less effective or non-functional. Cell division is then less well-regulated, leading to tumors.     The [employer] [place] [employer] (NIH) developed clinical diagnostic criteria for NF1. A clinical diagnosis of NF1 is made in an individual who has two or more of the following features:     Six or more cafe-au-lait spots/macules (CALs or CALMs)   CALS must be over 5 mm at their widest spot (if patient is pre-puberty) and over 15 mm at their widest spot (if patient has passed puberty)  Patterns of freckling in places that the skin folds  Typically axillary " "(armpit), submammary (under breast), or inguinal (groin) regions   2+ neurofibromas of any type or at least one plexiform neurofibroma   Optic pathway glioma   A slow-growing brain tumor that originates around the optic nerve, which conducts information from the eye to the brain  Typically found in young children (up to age 10)  Features: vision loss/impairment, unusual prominence of the eyeball (proptosis), and hormonal problems (like weight loss/gain, unusual growth)  Two or more iris Lisch nodules or two or more choroidal abnormalities   Lisch nodules = Melanocytic hamartomas of the eye.   Pigment-producing cells (melanocytes) overgrow, causing extra tissue matching the tissue around it (hamartoma)  Identified by slit lamp examination   Choroidal abnormalities = bright, patchy nodules within the eye   Identified by GEORGE or OCT imaging   A specific kind of bone damage (may be called \"osseous lesions\"). Examples include:  Sphenoid dysplasia, a problem with craniofacial bone development  Anterolateral bowing of the tibia  Pseudarthrosis of a long bone  Scoliosis  A heterozygous pathogenic NF1 variant with a variant allele fraction of 50% in apparently normal tissue such as white blood cells  A first degree relative (parent, sibling or offspring) with NF1 as defined by the above criteria     A clinical diagnosis of NF1 is based on whether someone exhibits two or more of these features, while a molecular diagnosis is based on genetic testing. Even if no variant is found in the NF1 gene with genetic testing, a person may still have a clinical diagnosis based on their features. Around 5% of individuals with NF1 do not have a genetic change that we can identify or locate with current technology.     Although the penetrance of NF1 is essentially complete, the clinical manifestations are extremely variable. This variation occurs even between family members with the exact same genetic change. Multiple café au lait spots " occur in nearly all patients (>90%) and intertriginous freckling develops in almost 90%. Benign cutaneous or subcutaneous neurofibromas are usually present in adults with NF1. Plexiform neurofibromas are less common but can cause disfigurement and may compromise function or even jeopardize life. Most plexiform neurofibromas are internal, asymptomatic, and not suspected on physical examination.     Ocular manifestations of NF1 include optic gliomas, which may lead to vision loss. Additionally, throughout childhood an individual will likely develop an increasing number of Lisch nodules, benign iris hamartomas that appear on eye exams. Scoliosis, vertebral dysplasia, pseudoarthrosis, and overgrowth are the most serious bony complications of NF1. Other medical concerns include vasculopathy, hypertension, intracranial tumors, and malignant peripheral nerve sheath tumors. About half of people with NF1 have a learning disability, such as visual-spatial deficits.     ***Philippe does not meet diagnostic criteria for NF1 at this time.   ***Philippe meets diagnostic criteria at this time considering the following features:     Segmental  The presence of clinical findings of NF1 that are confined to one or more well-circumscribed regions of the body is referred to as segmental or mosaic NF1.  Typically, these manifestations do not encompass the entire clinical spectrum of NF1 and may include only dermatologic findings and/or neurofibromas.  It is believed that segmental NF1 results from a post-zygotic pathogenic variant, or mutation, in the NF1 gene. This means that a genetic change occurs in one cell when someone is only a few cells big, often just after conception. Any cells that divide out from the altered cell will also carry the mutation, while the cells that divide from an unaffected cell will remain unaffected.    Individuals with segmental NF1 may have germ cells (egg & sperm cells) with the pathogenic variant. Therefore,  "they can be at risk to have offspring with the mutation in all cells, resulting in classic NF1.  This risk is quoted to be as high as 50%, though it is likely to be considerably lower. Genetic testing is available by skin biopsy of the affected area. We {ARE/ARE NOT:574649} suspicious that Kimberlys features are segmental.     Genetic Testing  Genetic testing can take many forms. Typically, with NF1, we specifically look for changes in the NF1 gene (and may include a gene called SPRED1, associated with a related condition called Legius syndrome). We both \"proofread\" the sequence of chemical bases in the gene for mutations (sequence analysis) and look to see if any parts of the gene are missing or extra (deletion or duplication analysis).     At today's visit, we reviewed genetic testing options available to Philippe, including NF1 analysis. We discussed the details, limitations, and possible outcomes of next generation sequencing for ***NF1 and SPRED1.  There are three types of results:  Negative: meaning no pathogenic variants were identified in the genes that were tested  A negative result does not rule out the possibility that Philippe Mcqueens symptoms are due to a genetic etiology and cannot rule out segmental or mosaic NF  Positive: meaning one (or more) pathogenic variants were identified in the genes that were tested that are associated with Philippe Medellin's symptoms  We discussed that a positive result could provide an etiology to Philippe Mcqueens symptoms, give anticipatory guidance as to their potential progression, and clarify risks to family members.  We also discussed that a positive result could indicate that Philippe Medellin is at risks for other health concerns, outside of their presenting symptoms  Uncertain: meaning one (or more) variants were identified in the genes that were tested, but there is not enough data to know if this variant is disease-causing; these are " called variants of uncertain significance (VUS)  In most cases, identification of a VUS does not confirm a diagnosis and does not result in any clinically actionable recommendations.  The variant will be monitored over time to see if more information is known about it in the future.  If a VUS is identified, testing of other relatives may be helpful to provide clarification.    We discussed the potential benefits of genetic testing and why this genetic testing is medically indicated. A positive result will help determine the etiology of ***referringsymptoms noted in Philippe and could guide {hishertheir - lowercase:629278} medical management.  If a genetic cause is found for Philippe, it will give us a more accurate risk assessment for other family members.  Thus, the recommended testing for Philippe is DIAGNOSTIC testing, and it is NOT investigational.    Philippe expressed an excellent understanding of this information and agreed to the plan as set forth by {[facility]251978} and I, which is detailed at the beginning of this note (see Plan).     It was a pleasure meeting with Philippe's family today. {HE/SHE/THEY:293769} vocalized understanding of the information we discussed and {hishertheir - lowercase:972666} questions and concerns were addressed. {HE/SHE/THEY:608655} {HAS/HAVE:061627} been provided with my contact information should any questions arise regarding our visit or plan moving forward. In total, I spent *** minutes in face-to-face counseling with this {[facility]:419709}.     Maral Arnold MS, Merged with [facility]  Genetic Counselor - [facility]  Phone: [phone]  Email: [email or web address]      References  Nadia PATTERSON. Neurofibromatosis 1. 1998 Oct 2 [Updated 2022 Apr 21]. In: Eleuterio MP, Ziggy GM, Reg RA, et al., editors. Marquis  [Internet]. [place]): [facility], [place]; 7811-7663. Available from: https://www.ncbi.nlm.nih.gov/books/QVH8166/    True MAN, Titus SERNA. Guzman Ruggiero  Venkat. [Updated 2022 Sep 25]. In: Mobile[employer] [Internet]. [place]): [place]; 2022 Jan-. Available from: https://www.ncbi.nlm.nih.gov/books/XGB288174/    Lab results may be automatically released via [employer].  Department protocol is to hold genetic testing results until we have reviewed them. We will then contact the family directly to disclose the results and ensure they receive a copy of the report. This protocol was reviewed with the family, who were in agreement to hold the results for genetics review and direct contact.    *** minutes spent on the date of the encounter in chart review, patient visit, test coordination/review, documentation, and/or discussion with other providers about the issues documented above.       Please do not hesitate to contact me if you have any questions/concerns.     Sincerely,       Maral Arnold GC

## 2025-03-25 NOTE — TELEPHONE ENCOUNTER
Received voicemail from patient stating they are not ready to schedule hysterectomy surgery yet and will reach out with a final decision soon. Writer will wait for patients call    Christina    Surgery Scheduler

## 2025-03-27 ENCOUNTER — TELEPHONE (OUTPATIENT)
Dept: OBGYN | Facility: CLINIC | Age: 55
End: 2025-03-27
Payer: MEDICAID

## 2025-03-27 NOTE — TELEPHONE ENCOUNTER
Patient with questions about pathology results and about fibroid size. Discussed cervical biopsies (4 biopsies and ECC collected) were all negative for malignancy. Pap smear did show HSIL, but was HPV negative. Discussed that pap smear is a screening test and colposcopy is diagnostic so would go with the colposcopy results over the pap especially as we took 4 biopsies which has a very good chance of capturing dysplasia if it were present. Recommend repeat pap w/ HPV cotesting in 1 year.     In terms of fibroids, they have grown ~1cm in 2 years which is an appropriate growth. They do not appear concerning for malignancy.     Patient is still not sure if she wants to proceed with hysterectomy, we briefly discussed what an RA-TL would look like. We will discuss this more at her follow-up appointment with me.    Yasmin Greene MD  [facility], Ob/Gyn  03/27/2025 4:03 PM

## 2025-04-07 ENCOUNTER — ALLIED HEALTH/NURSE VISIT (OUTPATIENT)
Dept: OBGYN | Facility: CLINIC | Age: 55
End: 2025-04-07
Attending: STUDENT IN AN ORGANIZED HEALTH CARE EDUCATION/TRAINING PROGRAM
Payer: MEDICAID

## 2025-04-07 DIAGNOSIS — Z23 NEED FOR HPV VACCINE: Primary | ICD-10-CM

## 2025-04-07 PROCEDURE — 90471 IMMUNIZATION ADMIN: CPT

## 2025-04-07 PROCEDURE — 90651 9VHPV VACCINE 2/3 DOSE IM: CPT

## 2025-04-07 PROCEDURE — 250N000021 HC RX MED A9270 GY (STAT IND- M) 250

## 2025-04-07 NOTE — PATIENT INSTRUCTIONS
Thank you for trusting us with your care!   Please be aware, if you are on Mychart, you may see your results prior to your providers review. If labs are abnormal, we will call or message you on ISC[employer]t with a follow up plan.    If you need to contact us for questions about:  Symptoms, Scheduling & Medical Questions; Non-urgent (2-3 day response) [employer] message, Urgent (needing response today) [phone] (if after 3:30pm next day response)   Prescriptions: Please call your Pharmacy   Billing: Chari [phone] or [facility]:[phone]

## 2025-04-14 ENCOUNTER — VIRTUAL VISIT (OUTPATIENT)
Dept: OBGYN | Facility: CLINIC | Age: 55
End: 2025-04-14
Attending: STUDENT IN AN ORGANIZED HEALTH CARE EDUCATION/TRAINING PROGRAM
Payer: MEDICAID

## 2025-04-14 DIAGNOSIS — D25.1 INTRAMURAL AND SUBMUCOUS LEIOMYOMA OF UTERUS: Primary | ICD-10-CM

## 2025-04-14 DIAGNOSIS — R87.613 HSIL ON PAP SMEAR OF CERVIX: ICD-10-CM

## 2025-04-14 DIAGNOSIS — D25.0 INTRAMURAL AND SUBMUCOUS LEIOMYOMA OF UTERUS: Primary | ICD-10-CM

## 2025-04-14 NOTE — LETTER
2025       RE: Philippe Medellin  [id number]  [facility] [id number]     Dear Colleague,    Thank you for referring your patient, Philippe Medellin, to the [facility] [place] [facility]. Please see a copy of my visit note below.    [facility]  Gynecology Visit    Virtual Visit Details    Type of service:  Video Visit   Video Start Time: 1:06pm  Video End Time: 1:47pm    Originating Location (pt. 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 She is currently on tamoxifen.    Her sister had a hysterectomy, ended up having emergency surgery for diverticulitis and had a colostomy.    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 She is currently getting tested to see if this is a somatic mutation, or just present in her cancer. If it is somatic, she would lean more towards a hysterectomy.    GYN History  Pap Smears:   2011: Pap test NILM  2012: Pap test NILM  2015: Pap test NILM, hrHPV negative  2019: Pap test NILM, hrHPV OTHER positive  2021: Pap test NILM, hrHPV negative  2025: Pap test HSIL, hrHPV negative  3/6/2025: Colposcopy w/ neg biopsies (4 bx taken), neg ECC      Past medical history  Past Medical History:   Diagnosis Date     ADD (attention deficit disorder)     diagnosed       Allergies     had allergy testing as a child     Asthma      Breast cancer (H) 2023     Migraine        Past surgical history  Past Surgical History:   Procedure Laterality Date     BREAST SURGERY Bilateral 2023    Breast reconstruction on the lt and Bilateral reduction     DENTAL SURGERY       INSERT INTRAUTERINE DEVICE        LUMPECTOMY BREAST, SEED LOCALIZATION, SENTINEL NODE Left 02/15/2023    Procedure: LUMPECTOMY, BREAST, WITH RADIOACTIVE SEED LOCALIZATION AND SENTINEL LYMPH NODE BIOPSY;  Surgeon: Noman Diggs MD;  Location: [facility] OR     NH LAP IUD REMOVAL         Medications    Current Outpatient Medications:      acetaminophen (TYLENOL) 325 MG tablet, Take 2 tablets (650 mg) by mouth every 4 hours as needed for mild pain, Disp: 50 tablet, Rfl: 0     acetaZOLAMIDE (DIAMOX) 125 MG tablet, Take 1 tablet (125 mg) by mouth 2 times daily. Start one day prior to ascent, continue for 2-3 days at higher elevation - to prevent altitude illness, Disp: 8 tablet, Rfl: 0     ammonium lactate (AMLACTIN) 12 % external cream, Apply 1 Application topically, Disp: , Rfl:      atovaquone-proguanil (MALARONE) 250-100 MG tablet, Take 1 tablet by mouth daily. Start 2 days before travel and continue 7 days after return., Disp: 25 tablet, Rfl: 0     cetirizine (ZYRTEC) 10 MG tablet, 5 mg.  Half tablet daily., Disp: , Rfl:      cyclobenzaprine (FLEXERIL) 10 MG tablet, Take 1 tablet by mouth every evening, Disp: , Rfl:      ferrous sulfate (SLO-FE) 142 (45 Fe) MG CR tablet, Take 1 tablet (142 mg) by mouth daily, Disp: 90 tablet, Rfl: 0     fluconazole (DIFLUCAN) 150 MG tablet, One tablet now.  May repeat in 72 hours if needed., Disp: 2 tablet, Rfl: 0     ketotifen (ZADITOR) 0.025 % ophthalmic solution, Place 1 drop into both eyes as needed, Disp: , Rfl:      magnesium oxide 200 MG TABS, Take 200 mg by mouth daily, Disp: , Rfl:      methylphenidate (CONCERTA) 36 MG CR tablet, Take 36 mg by mouth 2 times daily, Disp: , Rfl:      methylphenidate (RITALIN) 20 MG tablet, Take 20 mg by mouth every morning, Disp: , Rfl:      MULTIVITAMINS OR, Take by mouth daily (with lunch), Disp: , Rfl:      PROAIR  (90 BASE) MCG/ACT IN AERS, Inhale into the lungs as needed, Disp: , Rfl:      SODIUM FLUORIDE 5000 PPM 1.1 % PSTE dental paste, Apply to affected area 2 times  daily., Disp: , Rfl:      SUMAtriptan (IMITREX) 100 MG tablet, Take 2 tablets by mouth at onset of headache for migraine., Disp: , Rfl:      SUMAtriptan (IMITREX) 20 MG/ACT nasal spray, Spray 1 spray in nostril as needed., Disp: , Rfl:      tamoxifen (NOLVADEX) 10 MG tablet, TAKE ONE HALF TABLET BY MOUTH EVERY DAY, Disp: 45 tablet, Rfl: 0     tretinoin (RETIN-A) 0.025 % external cream, Apply topically At Bedtime, Disp: , Rfl:      UNABLE TO FIND, daily (with lunch) MEDICATION NAME: Turkey Tail, Disp: , Rfl:      Vitamin D (Cholecalciferol) 50 MCG (2000 UT) CAPS, Take 2 tablets by mouth daily., Disp: , Rfl:      ZOLMitriptan (ZOMIG-ZMT) 5 MG ODT, Take 5 mg by mouth as needed., Disp: , Rfl:     Allergies  Allergies   Allergen Reactions     Bee Pollen Anaphylaxis     Other reaction(s): anaphylaxis  BEE Venom       Dog Epithelium (Canis Lupus Familiaris) Difficulty breathing and Shortness Of Breath     Other reaction(s): difficulty breathing     Walnuts [Nuts] Hives     Doxycycline Rash     Sulfa Antibiotics Rash     Sulfamethoxazole-Trimethoprim      Other reaction(s): Unknown     Trimethoprim Rash     Wasp Venom      Other reaction(s): Unknown       Family history  Family History   Problem Relation Age of Onset     Gastrointestinal Disease Mother         irritable bowel     Skin Cancer Mother      Prostate Cancer Father         age 60     Cardiovascular Father         anurysyms     Thyroid Disease Sister         hypothyroid     Thyroid Disease Maternal Grandmother         hypothyroid     Osteoporosis Maternal Grandmother      Cancer Maternal Grandfather         non hodgkins lymphoma     Osteoporosis Paternal Grandmother      Cerebrovascular Disease Paternal Grandmother      Cardiovascular Paternal Grandmother      Venous thrombosis Paternal Grandmother      Prostate Cancer Paternal Grandfather      Allergies Daughter         food     Allergies Son         food     Breast Cancer Maternal Aunt      Breast Cancer  Maternal Cousin         1st cousin     Colon Cancer Maternal Uncle      Anesthesia Reaction No family hx of        Social history  Social History     Socioeconomic History     Marital status:      Spouse name: Esau     Number of children: 2     Years of education: Not on file     Highest education level: Not on file   Occupational History     Not on file   Tobacco Use     Smoking status: Never     Smokeless tobacco: Never     Tobacco comments:     no smokers in the household   Substance and Sexual Activity     Alcohol use: Not Currently     Drug use: No     Sexual activity: Yes     Partners: Male     Birth control/protection: I.U.D.   Other Topics Concern     Parent/sibling w/ CABG, MI or angioplasty before 65F 55M? Yes   Social History Narrative     Not on file     Social Drivers of Health     Financial Resource Strain: Low Risk  (2/28/2025)    Financial Resource Strain      Within the past 12 months, have you or your family members you live with been unable to get utilities (heat, electricity) when it was really needed?: No   Food Insecurity: Low Risk  (2/28/2025)    Food Insecurity      Within the past 12 months, did you worry that your food would run out before you got money to buy more?: No      Within the past 12 months, did the food you bought just not last and you didn t have money to get more?: No   Transportation Needs: Low Risk  (2/28/2025)    Transportation Needs      Within the past 12 months, has lack of transportation kept you from medical appointments, getting your medicines, non-medical meetings or appointments, work, or from getting things that you need?: No   Physical Activity: Sufficiently Active (2/28/2025)    Exercise Vital Sign      Days of Exercise per Week: 7 days      Minutes of Exercise per Session: 50 min   Stress: Stress Concern Present (2/28/2025)    [place] [employer] - Occupational Stress Questionnaire      Feeling of Stress : To some extent   Social  Connections: Unknown (2025)    Social Connection and Isolation Panel [NHANES]      Frequency of Communication with Friends and Family: Not on file      Frequency of Social Gatherings with Friends and Family: Twice a week      Attends Buddhist Services: Not on file      Active Member of Clubs or Organizations: Not on file      Attends Club or Organization Meetings: Not on file      Marital Status: Not on file   Interpersonal Safety: Low Risk  (10/18/2024)    Interpersonal Safety      Do you feel physically and emotionally safe where you currently live?: Yes      Within the past 12 months, have you been hit, slapped, kicked or otherwise physically hurt by someone?: No      Within the past 12 months, have you been humiliated or emotionally abused in other ways by your partner or ex-partner?: No   Housing Stability: Low Risk  (2025)    Housing Stability      Do you have housing? : Yes      Are you worried about losing your housing?: No       Physical Exam: deferred      Assessment/Plan:  Philippe Medellin is a 54 year old  female here for discussion regarding risk-reducing hysterectomy.     Hx breast cancer w/ NF1 mutation  Risk reducing hysterectomy  - Discussed risks and benefits of hysterectomy for Philippe given her prior medical conditions. Benefits include removing risk of endometrial hyperplasia in the setting of tamoxifen, decreasing/eliminating bleeding from perimenopause/fibroids, possible risk of cancer if she has NF1 somatic mutation, and removing the cervix due to her recently very abnormal pap smear w/ HSIL. Risks would be the risks of surgery as this is a major surgery.  - We also discussed that given that she is > 50, the data shows there is not significant benefit in keeping the ovaries. Additionally, if the NF1 mutation is somatic and results in increased risk of ovarian cancer, would recommend removal of ovaries. Unfortunately, there is not significant data surrounding this  mutation and ovarian cancer - so given her age and known mutation, would lean towards ovarian removal if the NF1 mutation is found to be somatic. Of note, patient is NOT a candidate for menopausal hormone therapy as her breast cancer was hormone receptor positive.  - We also discussed that fibroids appear very benign - I have very low concern for malignancy in relation to these fibroids.  - At this time, patient would like to await her genetic results before moving towards a decision    HSIL pap w/ negative colposcopic biopsies  - Reviewed 2019 ASCCP guidelines which recommend either diagnostic LEEP vs observation with repeat HPV based testing in 1 year in the case of HSIL pap w/ normal colposcopic biopsies. However, they do recommend more aggressive management if pap was HSIL (as compared to ASC-H which is more okay to be observed).  - After discussion, patient prefers to proceed with LEEP - case request placed to be scheduled in OR    Submucosal fibroid  - As stated above, imaging very consistent with benign fibroids. Very low concern for any sort of malignancy from this standpoint.   - Patient does have 3cm submucosal fibroid, would be reasonable to attempt hysteroscopic resection.  - Will place case request for hysteroscopy, D&C, myomectomy at time of LEEP    Return to clinic for postop visit    Yasmin Greene MD  [facility], Ob/Gyn  04/15/2025 10:23 PM        Again, thank you for allowing me to participate in the care of your patient.      Sincerely,    Yasmin Greene MD

## 2025-04-14 NOTE — PROGRESS NOTES
[facility]  Gynecology Visit    Virtual Visit Details    Type of service:  Video Visit   Video Start Time: 1:06pm  Video End Time: 1:47pm    Originating Location (pt. 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 She is currently on tamoxifen.    Her sister had a hysterectomy, ended up having emergency surgery for diverticulitis and had a colostomy.    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 She is currently getting tested to see if this is a somatic mutation, or just present in her cancer. If it is somatic, she would lean more towards a hysterectomy.    GYN History  Pap Smears:   2011: Pap test NILM  2012: Pap test NILM  2015: Pap test NILM, hrHPV negative  2019: Pap test NILM, hrHPV OTHER positive  2021: Pap test NILM, hrHPV negative  2025: Pap test HSIL, hrHPV negative  3/6/2025: Colposcopy w/ neg biopsies (4 bx taken), neg ECC      Past medical history  Past Medical History:   Diagnosis Date    ADD (attention deficit disorder)     diagnosed      Allergies     had allergy testing as a child    Asthma     Breast cancer (H) 2023    Migraine        Past surgical history  Past Surgical History:   Procedure Laterality Date    BREAST SURGERY Bilateral 2023    Breast reconstruction on the lt and Bilateral reduction    DENTAL SURGERY      INSERT INTRAUTERINE DEVICE      LUMPECTOMY BREAST, SEED LOCALIZATION, SENTINEL NODE Left 02/15/2023    Procedure: LUMPECTOMY, BREAST, WITH RADIOACTIVE SEED LOCALIZATION AND SENTINEL LYMPH NODE BIOPSY;  Surgeon: Noman Diggs MD;  Location: [facility] OR    ME LAP IUD REMOVAL         Medications    Current Outpatient Medications:     acetaminophen (TYLENOL) 325 MG tablet, Take 2  tablets (650 mg) by mouth every 4 hours as needed for mild pain, Disp: 50 tablet, Rfl: 0    acetaZOLAMIDE (DIAMOX) 125 MG tablet, Take 1 tablet (125 mg) by mouth 2 times daily. Start one day prior to ascent, continue for 2-3 days at higher elevation - to prevent altitude illness, Disp: 8 tablet, Rfl: 0    ammonium lactate (AMLACTIN) 12 % external cream, Apply 1 Application topically, Disp: , Rfl:     atovaquone-proguanil (MALARONE) 250-100 MG tablet, Take 1 tablet by mouth daily. Start 2 days before travel and continue 7 days after return., Disp: 25 tablet, Rfl: 0    cetirizine (ZYRTEC) 10 MG tablet, 5 mg.  Half tablet daily., Disp: , Rfl:     cyclobenzaprine (FLEXERIL) 10 MG tablet, Take 1 tablet by mouth every evening, Disp: , Rfl:     ferrous sulfate (SLO-FE) 142 (45 Fe) MG CR tablet, Take 1 tablet (142 mg) by mouth daily, Disp: 90 tablet, Rfl: 0    fluconazole (DIFLUCAN) 150 MG tablet, One tablet now.  May repeat in 72 hours if needed., Disp: 2 tablet, Rfl: 0    ketotifen (ZADITOR) 0.025 % ophthalmic solution, Place 1 drop into both eyes as needed, Disp: , Rfl:     magnesium oxide 200 MG TABS, Take 200 mg by mouth daily, Disp: , Rfl:     methylphenidate (CONCERTA) 36 MG CR tablet, Take 36 mg by mouth 2 times daily, Disp: , Rfl:     methylphenidate (RITALIN) 20 MG tablet, Take 20 mg by mouth every morning, Disp: , Rfl:     MULTIVITAMINS OR, Take by mouth daily (with lunch), Disp: , Rfl:     PROAIR  (90 BASE) MCG/ACT IN AERS, Inhale into the lungs as needed, Disp: , Rfl:     SODIUM FLUORIDE 5000 PPM 1.1 % PSTE dental paste, Apply to affected area 2 times daily., Disp: , Rfl:     SUMAtriptan (IMITREX) 100 MG tablet, Take 2 tablets by mouth at onset of headache for migraine., Disp: , Rfl:     SUMAtriptan (IMITREX) 20 MG/ACT nasal spray, Spray 1 spray in nostril as needed., Disp: , Rfl:     tamoxifen (NOLVADEX) 10 MG tablet, TAKE ONE HALF TABLET BY MOUTH EVERY DAY, Disp: 45 tablet, Rfl: 0    tretinoin  (RETIN-A) 0.025 % external cream, Apply topically At Bedtime, Disp: , Rfl:     UNABLE TO FIND, daily (with lunch) MEDICATION NAME: Turkey Tail, Disp: , Rfl:     Vitamin D (Cholecalciferol) 50 MCG (2000 UT) CAPS, Take 2 tablets by mouth daily., Disp: , Rfl:     ZOLMitriptan (ZOMIG-ZMT) 5 MG ODT, Take 5 mg by mouth as needed., Disp: , Rfl:     Allergies  Allergies   Allergen Reactions    Bee Pollen Anaphylaxis     Other reaction(s): anaphylaxis  BEE Venom      Dog Epithelium (Canis Lupus Familiaris) Difficulty breathing and Shortness Of Breath     Other reaction(s): difficulty breathing    Walnuts [Nuts] Hives    Doxycycline Rash    Sulfa Antibiotics Rash    Sulfamethoxazole-Trimethoprim      Other reaction(s): Unknown    Trimethoprim Rash    Wasp Venom      Other reaction(s): Unknown       Family history  Family History   Problem Relation Age of Onset    Gastrointestinal Disease Mother         irritable bowel    Skin Cancer Mother     Prostate Cancer Father         age 60    Cardiovascular Father         anurysyms    Thyroid Disease Sister         hypothyroid    Thyroid Disease Maternal Grandmother         hypothyroid    Osteoporosis Maternal Grandmother     Cancer Maternal Grandfather         non hodgkins lymphoma    Osteoporosis Paternal Grandmother     Cerebrovascular Disease Paternal Grandmother     Cardiovascular Paternal Grandmother     Venous thrombosis Paternal Grandmother     Prostate Cancer Paternal Grandfather     Allergies Daughter         food    Allergies Son         food    Breast Cancer Maternal Aunt     Breast Cancer Maternal Cousin         1st cousin    Colon Cancer Maternal Uncle     Anesthesia Reaction No family hx of        Social history  Social History     Socioeconomic History    Marital status:      Spouse name: Esau    Number of children: 2    Years of education: Not on file    Highest education level: Not on file   Occupational History    Not on file   Tobacco Use    Smoking status:  Never    Smokeless tobacco: Never    Tobacco comments:     no smokers in the household   Substance and Sexual Activity    Alcohol use: Not Currently    Drug use: No    Sexual activity: Yes     Partners: Male     Birth control/protection: I.U.D.   Other Topics Concern    Parent/sibling w/ CABG, MI or angioplasty before 65F 55M? Yes   Social History Narrative    Not on file     Social Drivers of Health     Financial Resource Strain: Low Risk  (2/28/2025)    Financial Resource Strain     Within the past 12 months, have you or your family members you live with been unable to get utilities (heat, electricity) when it was really needed?: No   Food Insecurity: Low Risk  (2/28/2025)    Food Insecurity     Within the past 12 months, did you worry that your food would run out before you got money to buy more?: No     Within the past 12 months, did the food you bought just not last and you didn t have money to get more?: No   Transportation Needs: Low Risk  (2/28/2025)    Transportation Needs     Within the past 12 months, has lack of transportation kept you from medical appointments, getting your medicines, non-medical meetings or appointments, work, or from getting things that you need?: No   Physical Activity: Sufficiently Active (2/28/2025)    Exercise Vital Sign     Days of Exercise per Week: 7 days     Minutes of Exercise per Session: 50 min   Stress: Stress Concern Present (2/28/2025)    [place] [employer]ational [employer] - Occupational Stress Questionnaire     Feeling of Stress : To some extent   Social Connections: Unknown (2/28/2025)    Social Connection and Isolation Panel [NHANES]     Frequency of Communication with Friends and Family: Not on file     Frequency of Social Gatherings with Friends and Family: Twice a week     Attends Confucianist Services: Not on file     Active Member of Clubs or Organizations: Not on file     Attends Club or Organization Meetings: Not on file     Marital Status: Not on file    Interpersonal Safety: Low Risk  (10/18/2024)    Interpersonal Safety     Do you feel physically and emotionally safe where you currently live?: Yes     Within the past 12 months, have you been hit, slapped, kicked or otherwise physically hurt by someone?: No     Within the past 12 months, have you been humiliated or emotionally abused in other ways by your partner or ex-partner?: No   Housing Stability: Low Risk  (2025)    Housing Stability     Do you have housing? : Yes     Are you worried about losing your housing?: No       Physical Exam: deferred      Assessment/Plan:  Philippe Medellin is a 54 year old  female here for discussion regarding risk-reducing hysterectomy.     Hx breast cancer w/ NF1 mutation  Risk reducing hysterectomy  - Discussed risks and benefits of hysterectomy for Philippe given her prior medical conditions. Benefits include removing risk of endometrial hyperplasia in the setting of tamoxifen, decreasing/eliminating bleeding from perimenopause/fibroids, possible risk of cancer if she has NF1 somatic mutation, and removing the cervix due to her recently very abnormal pap smear w/ HSIL. Risks would be the risks of surgery as this is a major surgery.  - We also discussed that given that she is > 50, the data shows there is not significant benefit in keeping the ovaries. Additionally, if the NF1 mutation is somatic and results in increased risk of ovarian cancer, would recommend removal of ovaries. Unfortunately, there is not significant data surrounding this mutation and ovarian cancer - so given her age and known mutation, would lean towards ovarian removal if the NF1 mutation is found to be somatic. Of note, patient is NOT a candidate for menopausal hormone therapy as her breast cancer was hormone receptor positive.  - We also discussed that fibroids appear very benign - I have very low concern for malignancy in relation to these fibroids.  - At this time, patient would like to  await her genetic results before moving towards a decision    HSIL pap w/ negative colposcopic biopsies  - Reviewed 2019 ASCCP guidelines which recommend either diagnostic LEEP vs observation with repeat HPV based testing in 1 year in the case of HSIL pap w/ normal colposcopic biopsies. However, they do recommend more aggressive management if pap was HSIL (as compared to ASC-H which is more okay to be observed).  - After discussion, patient prefers to proceed with LEEP - case request placed to be scheduled in OR    Submucosal fibroid  - As stated above, imaging very consistent with benign fibroids. Very low concern for any sort of malignancy from this standpoint.   - Patient does have 3cm submucosal fibroid, would be reasonable to attempt hysteroscopic resection.  - Will place case request for hysteroscopy, D&C, myomectomy at time of LEEP    Return to clinic for postop visit    Yasmin Greene MD  [facility], Ob/[facility]yn  04/15/2025 10:23 PM

## 2025-04-15 RX ORDER — ACETAMINOPHEN 325 MG/1
975 TABLET ORAL ONCE
OUTPATIENT
Start: 2025-04-15 | End: 2025-04-15

## 2025-04-16 ENCOUNTER — TELEPHONE (OUTPATIENT)
Dept: OBGYN | Facility: CLINIC | Age: 55
End: 2025-04-16
Payer: MEDICAID

## 2025-04-17 ENCOUNTER — TELEPHONE (OUTPATIENT)
Dept: CONSULT | Facility: CLINIC | Age: 55
End: 2025-04-17
Payer: MEDICAID

## 2025-04-17 DIAGNOSIS — Z15.89 MONOALLELIC MUTATION OF NF1 GENE: ICD-10-CM

## 2025-04-17 DIAGNOSIS — Z17.0 MALIGNANT NEOPLASM OF UPPER-OUTER QUADRANT OF LEFT BREAST IN FEMALE, ESTROGEN RECEPTOR POSITIVE (H): Primary | ICD-10-CM

## 2025-04-17 DIAGNOSIS — Z71.83 ENCOUNTER FOR NONPROCREATIVE GENETIC COUNSELING AND TESTING: ICD-10-CM

## 2025-04-17 DIAGNOSIS — Z13.71 ENCOUNTER FOR NONPROCREATIVE GENETIC COUNSELING AND TESTING: ICD-10-CM

## 2025-04-17 DIAGNOSIS — C50.412 MALIGNANT NEOPLASM OF UPPER-OUTER QUADRANT OF LEFT BREAST IN FEMALE, ESTROGEN RECEPTOR POSITIVE (H): Primary | ICD-10-CM

## 2025-04-17 PROBLEM — D25.1 INTRAMURAL AND SUBMUCOUS LEIOMYOMA OF UTERUS: Status: ACTIVE | Noted: 2025-04-14

## 2025-04-17 PROBLEM — D25.0 INTRAMURAL AND SUBMUCOUS LEIOMYOMA OF UTERUS: Status: ACTIVE | Noted: 2025-04-14

## 2025-04-17 PROBLEM — R87.613 HSIL ON PAP SMEAR OF CERVIX: Status: ACTIVE | Noted: 2025-04-14

## 2025-04-17 NOTE — TELEPHONE ENCOUNTER
"Philippe called to touch base today (04/17) about genetic testing for her tumor tissue from breast surgery in February 2023. We also reviewed status update for insurance approval for higher-resolution genetic testing on blood via [facility] ([facility] result re-classified to pathogenic, but mosaicism level is of clinical relevance to this patient). We heard back from prior authorization team that PA is not required for coverage and all test codes should be covered services. No expected out of pocket cost for Philippe. I notified her via email after our phone conversation (04/18) due to timing of response from PA team. Since we are looking for a previously identified variant, we should be able to order known variant targeted testing.     Philippe updated me on her recent visit to the OBGYN. She had an abnormal PAP test showing \"all three types of dysplasia/hyperplasia\" tested. Her OBGYN has suggested a LEEP procedure and/or a hysteroscopy to remove uterine fibroids. Philippe is considering her options between these procedures and wants to know all information possible, including tumor-based testing for NF1 involvement in breast neoplasm, before proceeding.    Philippe is also curious about the loss of color in the left areola following breast reconstruction. Her specialist at the time noted this is more commonly seen in  women, and patient is concerned about the possibility of leiomyosarcoma - which she states is also more common in  women - due to this difference within her breast tissue. I let her know I could not think of a genetic explanation for the loss of pigmentation or risk of leiomyosarcoma, but I will discuss her question to our NF specialist to see what he thinks. Of note, in my review of the literature, I do not see an ancestry-based increased risk for leiomyosarcoma.         "

## 2025-04-21 ENCOUNTER — TELEPHONE (OUTPATIENT)
Dept: ONCOLOGY | Facility: CLINIC | Age: 55
End: 2025-04-21
Payer: MEDICAID

## 2025-04-21 DIAGNOSIS — C50.412 MALIGNANT NEOPLASM OF UPPER-OUTER QUADRANT OF LEFT BREAST IN FEMALE, ESTROGEN RECEPTOR POSITIVE (H): Primary | ICD-10-CM

## 2025-04-21 DIAGNOSIS — Z17.0 MALIGNANT NEOPLASM OF UPPER-OUTER QUADRANT OF LEFT BREAST IN FEMALE, ESTROGEN RECEPTOR POSITIVE (H): Primary | ICD-10-CM

## 2025-04-22 ENCOUNTER — TRANSCRIBE ORDERS (OUTPATIENT)
Dept: OTHER | Age: 55
End: 2025-04-22

## 2025-04-22 DIAGNOSIS — Q85.01 NEUROFIBROMATOSIS, PERIPHERAL, NF1 (H): Primary | ICD-10-CM

## 2025-04-28 DIAGNOSIS — C50.412 MALIGNANT NEOPLASM OF UPPER-OUTER QUADRANT OF LEFT BREAST IN FEMALE, ESTROGEN RECEPTOR POSITIVE (H): ICD-10-CM

## 2025-04-28 DIAGNOSIS — Z17.0 MALIGNANT NEOPLASM OF UPPER-OUTER QUADRANT OF LEFT BREAST IN FEMALE, ESTROGEN RECEPTOR POSITIVE (H): ICD-10-CM

## 2025-04-28 RX ORDER — TAMOXIFEN CITRATE 10 MG/1
5 TABLET ORAL
Qty: 45 TABLET | Refills: 3 | Status: SHIPPED | OUTPATIENT
Start: 2025-04-28

## 2025-04-30 ENCOUNTER — VIRTUAL VISIT (OUTPATIENT)
Dept: PEDIATRIC HEMATOLOGY/ONCOLOGY | Facility: CLINIC | Age: 55
End: 2025-04-30
Attending: PEDIATRICS
Payer: MEDICAID

## 2025-04-30 DIAGNOSIS — Q85.01 NEUROFIBROMATOSIS, PERIPHERAL, NF1 (H): Primary | ICD-10-CM

## 2025-04-30 PROCEDURE — 98006 SYNCH AUDIO-VIDEO EST MOD 30: CPT | Performed by: PEDIATRICS

## 2025-04-30 NOTE — Clinical Note
4/30/2025      RE: Philippe Medellin  [address] [facility] [address]     Dear Colleague,    Thank you for the opportunity to participate in the care of your patient, Philippe Medellin, at the [facility] at [facility]. Please see a copy of my visit note below.    Virtual Visit Details    Type of service:  Video Visit   Video Start Time: 3:04 PM  Video End Time:3:31 PM    Originating Location (pt. Location): Home  {PROVIDER LOCATION On-site should be selected for visits conducted from your clinic location or adjoining [facility] hospital, academic office, or other nearby [facility] building. Off-site should be selected for all other provider locations, including home:[id number]}  Distant Location (provider location):  On-site  [facility]: 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 Because of her history of breast cancer, she wanted to know if the mutation will affect her whole body or just her breasts. She continues to be on tamoxifen for breast cancer prevention. I recommended that she treat the mutation as though it is everywhere in communications with other healthcare professionals and remain alert for signs of new tumors.    She also notes concern about her risk of uterine and ovarian cancers. Philippe notes a leiomyoma. She has also been having breakthrough bleeding between periods. In a recent pap smear, H cells were revealed in her cervix, but a biopsy afterwards came back negative. Philippe was previously a candidate for a hysterectomy, but  decided against the procedure after reading more about it and noting lessened thyroid symptoms. We assured her that her leiomyoma does not increase her risk for uterine and ovarian cancers.     Philippe notes tightness around the scar tissue around her tricep and tenderness when she bumps the area. She notes only slight edema, making it hard to wear her wedding band. She has been doing lymphedema drainage as directed. I encouraged her to seek out physical therapy.    She notes a sore, round spot on her right elbow that went away last night. It was painful and felt like a fluid sac. Philippe has only had local radiation for her breast cancer, so we are not worried about radiation exposure in her uterus and ovaries.    Fam/soc: Philippe's son and daughter have tested negative for NF1.    Current Outpatient Medications   Medication Sig Dispense Refill    acetaminophen (TYLENOL) 325 MG tablet Take 2 tablets (650 mg) by mouth every 4 hours as needed for mild pain 50 tablet 0    acetaZOLAMIDE (DIAMOX) 125 MG tablet Take 1 tablet (125 mg) by mouth 2 times daily. Start one day prior to ascent, continue for 2-3 days at higher elevation - to prevent altitude illness 8 tablet 0    ammonium lactate (AMLACTIN) 12 % external cream Apply 1 Application topically      atovaquone-proguanil (MALARONE) 250-100 MG tablet Take 1 tablet by mouth daily. Start 2 days before travel and continue 7 days after return. 25 tablet 0    cetirizine (ZYRTEC) 10 MG tablet 5 mg.  Half tablet daily.      cyclobenzaprine (FLEXERIL) 10 MG tablet Take 1 tablet by mouth every evening      ferrous sulfate (SLO-FE) 142 (45 Fe) MG CR tablet Take 1 tablet (142 mg) by mouth daily 90 tablet 0    fluconazole (DIFLUCAN) 150 MG tablet One tablet now.  May repeat in 72 hours if needed. 2 tablet 0    ketotifen (ZADITOR) 0.025 % ophthalmic solution Place 1 drop into both eyes as needed      magnesium oxide 200 MG TABS Take 200 mg by mouth daily      methylphenidate  (CONCERTA) 36 MG CR tablet Take 36 mg by mouth 2 times daily      methylphenidate (RITALIN) 20 MG tablet Take 20 mg by mouth every morning      MULTIVITAMINS OR Take by mouth daily (with lunch)      PROAIR  (90 BASE) MCG/ACT IN AERS Inhale into the lungs as needed      SODIUM FLUORIDE 5000 PPM 1.1 % PSTE dental paste Apply to affected area 2 times daily.      SUMAtriptan (IMITREX) 100 MG tablet Take 2 tablets by mouth at onset of headache for migraine.      SUMAtriptan (IMITREX) 20 MG/ACT nasal spray Spray 1 spray in nostril as needed.      tamoxifen (NOLVADEX) 10 MG tablet TAKE 1/2 TABLET BY MOUTH EVERY DAY 45 tablet 3    tretinoin (RETIN-A) 0.025 % external cream Apply topically At Bedtime      UNABLE TO FIND daily (with lunch) MEDICATION NAME: Turkey Tail      Vitamin D (Cholecalciferol) 50 MCG (2000 UT) CAPS Take 2 tablets by mouth daily.      ZOLMitriptan (ZOMIG-ZMT) 5 MG ODT Take 5 mg by mouth as needed.       No current facility-administered medications for this visit.     Impression:  NF1  Hx of malignant neoplasm of left breast     Plan:  Recommended reading of [place] study on breast cancer and NF1. I will email her the corresponding literature at [email or web address].  I will look into statistics on her case during the conference in June.  Proceed with myomectomy.  Continue tamoxifen.  Encouraged physical therapy for tricep scar tissue tightness.  If Philippe notices any new lumps or bumps, she is encouraged to contact the clinic.     F/U ***     Total time spent on the following services on the date of the encounter:  Preparing to see patient, chart review, review of outside records, Referring or communicating with other healthcare professionals, Interpretation of labs, imaging and other tests, Performing a medically appropriate examination , Documenting clinical information in the electronic or other health record  and Total time spent: *** minutes    Karrie CHIN, am working as a scribe  for and in the presence of Dr. Nuñez on April 30, 2025. Dr. Nuñez performed the services described in this note and the note is both complete and accurate.     Trell Nuñez MD          Please do not hesitate to contact me if you have any questions/concerns.     Sincerely,       Trell Nuñez MD

## 2025-04-30 NOTE — PROGRESS NOTES
Virtual Visit Details    Type of service:  Video Visit   Video Start Time: 3:04 PM  Video End Time:3:31 PM    Originating Location (pt. Location): Home  {PROVIDER LOCATION On-site should be selected for visits conducted from your clinic location or adjoining [facility] hospital, academic office, or other nearby [facility] building. Off-site should be selected for all other provider locations, including home:[id number]}  Distant Location (provider location):  On-site  Platform used for Video Visit: Katherine Paez CHAU Medellin is a 54 year old with a history of NF1 and malignant neoplasm of upper-outer quadrant of left breast Nuclear Grade 3 ER+(15-20%) DCIS with less than 1 mm of invasive disease who presents to the clinic for a follow up visit, last seen in our clinic on 02/14/2024.     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 Because of her history of breast cancer, she wanted to know if the mutation will affect her whole body or just her breasts. She continues to be on tamoxifen for breast cancer prevention. I recommended that she treat the mutation as though it is everywhere in communications with other healthcare professionals and remain alert for signs of new tumors.    She also notes concern about her risk of uterine and ovarian cancers. Philippe notes a leiomyoma. She has also been having breakthrough bleeding between periods. In a recent pap smear, H cells were revealed in her cervix, but a biopsy afterwards came back negative. Philippe was previously a candidate for a hysterectomy, but decided against the procedure after reading more about it and noting lessened thyroid symptoms. We assured her that her leiomyoma does not increase her risk for uterine and ovarian cancers.     Philippe notes tightness around the scar tissue around her tricep and tenderness when she bumps the area. She notes only slight edema, making it hard to wear her wedding band. She has  been doing lymphedema drainage as directed. I encouraged her to seek out physical therapy.    She notes a sore, round spot on her right elbow that went away last night. It was painful and felt like a fluid sac. Philippe has only had local radiation for her breast cancer, so we are not worried about radiation exposure in her uterus and ovaries.    Fam/soc: Philippe's son and daughter have tested negative for NF1.    Current Outpatient Medications   Medication Sig Dispense Refill    acetaminophen (TYLENOL) 325 MG tablet Take 2 tablets (650 mg) by mouth every 4 hours as needed for mild pain 50 tablet 0    acetaZOLAMIDE (DIAMOX) 125 MG tablet Take 1 tablet (125 mg) by mouth 2 times daily. Start one day prior to ascent, continue for 2-3 days at higher elevation - to prevent altitude illness 8 tablet 0    ammonium lactate (AMLACTIN) 12 % external cream Apply 1 Application topically      atovaquone-proguanil (MALARONE) 250-100 MG tablet Take 1 tablet by mouth daily. Start 2 days before travel and continue 7 days after return. 25 tablet 0    cetirizine (ZYRTEC) 10 MG tablet 5 mg.  Half tablet daily.      cyclobenzaprine (FLEXERIL) 10 MG tablet Take 1 tablet by mouth every evening      ferrous sulfate (SLO-FE) 142 (45 Fe) MG CR tablet Take 1 tablet (142 mg) by mouth daily 90 tablet 0    fluconazole (DIFLUCAN) 150 MG tablet One tablet now.  May repeat in 72 hours if needed. 2 tablet 0    ketotifen (ZADITOR) 0.025 % ophthalmic solution Place 1 drop into both eyes as needed      magnesium oxide 200 MG TABS Take 200 mg by mouth daily      methylphenidate (CONCERTA) 36 MG CR tablet Take 36 mg by mouth 2 times daily      methylphenidate (RITALIN) 20 MG tablet Take 20 mg by mouth every morning      MULTIVITAMINS OR Take by mouth daily (with lunch)      PROAIR  (90 BASE) MCG/ACT IN AERS Inhale into the lungs as needed      SODIUM FLUORIDE 5000 PPM 1.1 % PSTE dental paste Apply to affected area 2 times daily.      SUMAtriptan  (IMITREX) 100 MG tablet Take 2 tablets by mouth at onset of headache for migraine.      SUMAtriptan (IMITREX) 20 MG/ACT nasal spray Spray 1 spray in nostril as needed.      tamoxifen (NOLVADEX) 10 MG tablet TAKE 1/2 TABLET BY MOUTH EVERY DAY 45 tablet 3    tretinoin (RETIN-A) 0.025 % external cream Apply topically At Bedtime      UNABLE TO FIND daily (with lunch) MEDICATION NAME: Turkey Tail      Vitamin D (Cholecalciferol) 50 MCG (2000 UT) CAPS Take 2 tablets by mouth daily.      ZOLMitriptan (ZOMIG-ZMT) 5 MG ODT Take 5 mg by mouth as needed.       No current facility-administered medications for this visit.     Impression:  NF1  Hx of malignant neoplasm of left breast     Plan:  Recommended reading of [place] study on breast cancer and NF1. I will email her the corresponding literature at [email or web address].  I will look into statistics on her case during the conference in June.  Proceed with myomectomy.  Continue tamoxifen.  Encouraged physical therapy for tricep scar tissue tightness.  If Philippe notices any new lumps or bumps, she is encouraged to contact the clinic.     F/U ***     Total time spent on the following services on the date of the encounter:  Preparing to see patient, chart review, review of outside records, Referring or communicating with other healthcare professionals, Interpretation of labs, imaging and other tests, Performing a medically appropriate examination , Documenting clinical information in the electronic or other health record  and Total time spent: *** minutes    I, Karrie Rich, am working as a scribe for and in the presence of Dr. Nuñez on April 30, 2025. Dr. Nuñez performed the services described in this note and the note is both complete and accurate.     Trell Nuñez MD

## 2025-04-30 NOTE — NURSING NOTE
"Philippe Medellin is a 54 year old female who is being evaluated via a billable video visit.      The patient has been notified of following:     \"This video visit will be conducted via a call between you and your physician/provider. We have found that certain health care needs can be provided without the need for an in-person physical exam.  This service lets us provide the care you need with a video conversation.  If a prescription is necessary we can send it directly to your pharmacy.  If lab work is needed we can place an order for that and you can then stop by our lab to have the test done at a later time.    If during the course of the call the physician/provider feels a video visit is not appropriate, you will not be charged for this service.\"     Patient has given verbal consent for Video visit? Yes    Patient would like the video invitation sent by: [employer]     Video Start Time: 2:53 PM    Philippe Medellin complains of    Chief Complaint   Patient presents with    RECHECK       Data Unavailable  Data Unavailable      I have reviewed and updated the patient's Past Medical History, Social History, Family History and Medication List.    ALLERGIES  Bee pollen, Dog epithelium (canis lupus familiaris), Walnuts [nuts], Doxycycline, Sulfa antibiotics, Sulfamethoxazole-trimethoprim, Trimethoprim, and Wasp venom      "

## 2025-05-07 ENCOUNTER — MYC MEDICAL ADVICE (OUTPATIENT)
Dept: SURGERY | Facility: AMBULATORY SURGERY CENTER | Age: 55
End: 2025-05-07
Payer: MEDICAID

## 2025-05-07 NOTE — TELEPHONE ENCOUNTER
Patient is only wanting to proceed with leep. Declines myomectomy at this point. Will send to Dr. Greene and surgery scheduler.    Procedure that is scheduled on 5/13/25 at this time.   Procedure Laterality Anesthesia   CONE BIOPSY, CERVIX, USING LOOP ELECTROSURGICAL EXCISION PROCEDURE (LEEP) N/A Choice   Hysteroscopy, dilation and curettage with morcellator, myomectomy

## 2025-05-07 NOTE — TELEPHONE ENCOUNTER
FUTURE VISIT INFORMATION      SURGERY INFORMATION:  Date: 2025   [place] [facility] [place]geon:  Yasmin Greene MD   Anesthesia Type:  Choice   Procedure: CONE BIOPSY, CERVIX, USING LOOP ELECTROSURGICAL EXCISION PROCEDURE (LEEP),  Hysteroscopy, dilation and curettage with morcellator, myomectomy   Consult: 25- Trell Nuñez MD      RECORDS REQUESTED FROM:       Primary Care Provider:Trell Nuñez MD      Pertinent Medical History:CARDIOVASCULAR SCREENING; LDL GOAL LESS THAN 160     Most recent EKG+ Tracin23

## 2025-05-08 ENCOUNTER — VIRTUAL VISIT (OUTPATIENT)
Dept: SURGERY | Facility: CLINIC | Age: 55
End: 2025-05-08
Payer: MEDICAID

## 2025-05-08 ENCOUNTER — VIRTUAL VISIT (OUTPATIENT)
Dept: ONCOLOGY | Facility: CLINIC | Age: 55
End: 2025-05-08
Attending: OBSTETRICS & GYNECOLOGY
Payer: MEDICAID

## 2025-05-08 ENCOUNTER — ANESTHESIA EVENT (OUTPATIENT)
Dept: SURGERY | Facility: AMBULATORY SURGERY CENTER | Age: 55
End: 2025-05-08
Payer: MEDICAID

## 2025-05-08 ENCOUNTER — PRE VISIT (OUTPATIENT)
Dept: SURGERY | Facility: CLINIC | Age: 55
End: 2025-05-08

## 2025-05-08 VITALS — BODY MASS INDEX: 26.33 KG/M2 | WEIGHT: 158 LBS | HEIGHT: 65 IN

## 2025-05-08 VITALS — BODY MASS INDEX: 27.14 KG/M2 | WEIGHT: 159 LBS | HEIGHT: 64 IN

## 2025-05-08 DIAGNOSIS — R87.613 HSIL ON PAP SMEAR OF CERVIX: ICD-10-CM

## 2025-05-08 DIAGNOSIS — R87.613 HSIL ON PAP SMEAR OF CERVIX: Primary | ICD-10-CM

## 2025-05-08 DIAGNOSIS — N93.8 DYSFUNCTIONAL UTERINE BLEEDING: ICD-10-CM

## 2025-05-08 DIAGNOSIS — D25.1 INTRAMURAL AND SUBMUCOUS LEIOMYOMA OF UTERUS: ICD-10-CM

## 2025-05-08 DIAGNOSIS — Z01.818 PREOP EXAMINATION: Primary | ICD-10-CM

## 2025-05-08 DIAGNOSIS — D25.0 INTRAMURAL AND SUBMUCOUS LEIOMYOMA OF UTERUS: ICD-10-CM

## 2025-05-08 RX ORDER — METHYLPHENIDATE HYDROCHLORIDE 36 MG/1
36 TABLET ORAL EVERY MORNING
COMMUNITY

## 2025-05-08 RX ORDER — FREMANEZUMAB-VFRM 225 MG/1.5ML
225 INJECTION SUBCUTANEOUS
COMMUNITY

## 2025-05-08 RX ORDER — FLUTICASONE PROPIONATE 44 UG/1
1 AEROSOL, METERED RESPIRATORY (INHALATION) AT BEDTIME
COMMUNITY

## 2025-05-08 RX ORDER — MOMETASONE FUROATE MONOHYDRATE 50 UG/1
1 SPRAY, METERED NASAL DAILY
COMMUNITY

## 2025-05-08 ASSESSMENT — LIFESTYLE VARIABLES: TOBACCO_USE: 0

## 2025-05-08 ASSESSMENT — ENCOUNTER SYMPTOMS
DYSRHYTHMIAS: 0
SEIZURES: 1

## 2025-05-08 ASSESSMENT — PAIN SCALES - GENERAL: PAINLEVEL_OUTOF10: NO PAIN (0)

## 2025-05-08 NOTE — H&P (VIEW-ONLY)
Pre-Operative H & P     CC:  Preoperative exam to assess for increased cardiopulmonary risk while undergoing surgery and anesthesia.    Date of Encounter: 5/8/2025  Primary Care Physician:  Trell Nuñez     Reason for visit:   Encounter Diagnoses   Name Primary?    Preop examination Yes    HSIL on Pap smear of cervix     Intramural and submucous leiomyoma of uterus        ALICIA Medellin is a 54 year old female who presents for pre-operative H & P in preparation for  Procedure Information       Case: [id number] Date/Time: 05/13/25 0800    Procedures:       CONE BIOPSY, CERVIX, USING LOOP ELECTROSURGICAL EXCISION PROCEDURE (LEEP) (Cervix)      Hysteroscopy, dilation and curettage with morcellator, myomectomy (Uterus)    Anesthesia type: Choice    Diagnosis:       Intramural and submucous leiomyoma of uterus [D25.1, D25.0]      HSIL on Pap smear of cervix [R87.613]    Pre-op diagnosis:       Intramural and submucous leiomyoma of uterus [D25.1, D25.0]      HSIL on Pap smear of cervix [R87.613]    Location: [place] [address] / [facility]    Providers: Yasmin Greene MD          History is obtained from the patient and chart review    Patient with history of NF1, and malignant neoplasm of upper-outer quadrant of left breast, nuclear grade 3 ER+ (15-20%) DCIS.  She is status post left breast lumpectomy with seed localization sentinel node biopsy on 2/15/2023, with bilateral breast reconstruction on 2/24/2023.  She continues on tamoxifen.     Most recently, she was evaluated by Dr. Greene for history of HSIL pap w/ negative colposcopic biopsies.  Patient's imaging was consistent with benign fibroids, including a 3 cm submucosal fibroid.  Surgical options were discussed, and she was counseled for above LEEP procedure, with attempt at hysteroscopic resection of submucosal fibroid.    Patient's history is otherwise significant for allergies, asthma,  ADHD, and migraine headaches.    For her history of NF1, she is followed by Dr. Nuñez.        Hx of abnormal bleeding or anti-platelet use: Denies    Menstrual history: Patient's last menstrual period was 05/06/2025.     Past Medical History  Past Medical History:   Diagnosis Date    ADD (attention deficit disorder)     diagnosed 1998     Allergies     had allergy testing as a child    Asthma     Breast cancer (H) 01/2023    HSIL on Pap smear of cervix     Intramural and submucous leiomyoma of uterus     Migraine     NF1     PONV (postoperative nausea and vomiting)     Seizures (H)     one seizure in 7th grade after fall, no recurrence       Past Surgical History  Past Surgical History:   Procedure Laterality Date    BREAST SURGERY Bilateral 02/24/2023    Breast reconstruction on the lt and Bilateral reduction    DENTAL SURGERY      INSERT INTRAUTERINE DEVICE      LUMPECTOMY BREAST, SEED LOCALIZATION, SENTINEL NODE Left 02/15/2023    Procedure: LUMPECTOMY, BREAST, WITH RADIOACTIVE SEED LOCALIZATION AND SENTINEL LYMPH NODE BIOPSY;  Surgeon: Noman Diggs MD;  Location: [facility] OR    CO LAP IUD REMOVAL         Prior to Admission Medications  Current Outpatient Medications   Medication Sig Dispense Refill    ammonium lactate (AMLACTIN) 12 % external cream Apply 1 Application topically      cetirizine (ZYRTEC) 10 MG tablet Take 5 mg by mouth at bedtime.  Half tablet daily.      cyclobenzaprine (FLEXERIL) 10 MG tablet Take 2 tablets by mouth every evening.      ferrous sulfate (SLO-FE) 142 (45 Fe) MG CR tablet Take 1 tablet (142 mg) by mouth daily 90 tablet 0    fluconazole (DIFLUCAN) 150 MG tablet One tablet now.  May repeat in 72 hours if needed. 2 tablet 0    fluticasone (FLOVENT HFA) 44 MCG/ACT inhaler Inhale 1 puff into the lungs at bedtime.      ketotifen (ZADITOR) 0.025 % ophthalmic solution Place 1 drop into both eyes as needed      magnesium oxide 200 MG TABS Take 200 mg by mouth daily      methylphenidate  (RITALIN) 20 MG tablet Take 20 mg by mouth every morning      methylphenidate HCl ER, OSM, (CONCERTA) 36 MG CR tablet Take 36 mg by mouth every morning.      mometasone (NASONEX) 50 MCG/ACT nasal spray Spray 1 spray into both nostrils daily.      MULTIVITAMINS OR Take by mouth daily (with lunch)      PROAIR  (90 BASE) MCG/ACT IN AERS Inhale into the lungs as needed      SODIUM FLUORIDE 5000 PPM 1.1 % PSTE dental paste Apply to affected area 2 times daily.      SUMAtriptan (IMITREX) 100 MG tablet Take 2 tablets by mouth at onset of headache for migraine.      SUMAtriptan (IMITREX) 20 MG/ACT nasal spray Spray 1 spray in nostril as needed.      tamoxifen (NOLVADEX) 10 MG tablet TAKE 1/2 TABLET BY MOUTH EVERY DAY (Patient taking differently: Take 5 mg by mouth every evening.) 45 tablet 3    tretinoin (RETIN-A) 0.025 % external cream Apply topically At Bedtime      UNABLE TO FIND Take 1 tablet by mouth daily (with lunch). MEDICATION NAME: [place] Tail      Vitamin D (Cholecalciferol) 50 MCG (2000 UT) CAPS Take 2 tablets by mouth daily (before lunch).      SHADY SOSLAURI subcutaneous Inject 225 mg subcutaneously every 30 days.         Allergies  Allergies   Allergen Reactions    Bee Pollen Anaphylaxis     Other reaction(s): anaphylaxis  BEE Venom      Dog Epithelium (Canis Lupus Familiaris) Difficulty breathing and Shortness Of Breath     Other reaction(s): difficulty breathing    Walnuts [Nuts] Hives    Doxycycline Rash    Sulfa Antibiotics Rash    Sulfamethoxazole-Trimethoprim      Other reaction(s): Unknown    Trimethoprim Rash    Wasp Venom      Other reaction(s): Unknown       Social History  Social History     Socioeconomic History    Marital status:      Spouse name: Esau    Number of children: 2    Years of education: Not on file    Highest education level: Not on file   Occupational History    Not on file   Tobacco Use    Smoking status: Never    Smokeless tobacco: Never    Tobacco comments:     no  smokers in the household   Substance and Sexual Activity    Alcohol use: Not Currently    Drug use: No    Sexual activity: Yes     Partners: Male     Birth control/protection: I.U.D.   Other Topics Concern    Parent/sibling w/ CABG, MI or angioplasty before 65F 55M? Yes   Social History Narrative    Not on file     Social Drivers of Health     Financial Resource Strain: Low Risk  (2/28/2025)    Financial Resource Strain     Within the past 12 months, have you or your family members you live with been unable to get utilities (heat, electricity) when it was really needed?: No   Food Insecurity: Low Risk  (2/28/2025)    Food Insecurity     Within the past 12 months, did you worry that your food would run out before you got money to buy more?: No     Within the past 12 months, did the food you bought just not last and you didn t have money to get more?: No   Transportation Needs: Low Risk  (2/28/2025)    Transportation Needs     Within the past 12 months, has lack of transportation kept you from medical appointments, getting your medicines, non-medical meetings or appointments, work, or from getting things that you need?: No   Physical Activity: Sufficiently Active (2/28/2025)    Exercise Vital Sign     Days of Exercise per Week: 7 days     Minutes of Exercise per Session: 50 min   Stress: Stress Concern Present (2/28/2025)    [place] [employer] - Occupational Stress Questionnaire     Feeling of Stress : To some extent   Social Connections: Unknown (2/28/2025)    Social Connection and Isolation Panel [NHANES]     Frequency of Communication with Friends and Family: Not on file     Frequency of Social Gatherings with Friends and Family: Twice a week     Attends Ep[employer] Services: Not on file     Active Member of Clubs or Organizations: Not on file     Attends Club or Organization Meetings: Not on file     Marital Status: Not on file   Interpersonal Safety: Low Risk  (10/18/2024)    Interpersonal  Safety     Do you feel physically and emotionally safe where you currently live?: Yes     Within the past 12 months, have you been hit, slapped, kicked or otherwise physically hurt by someone?: No     Within the past 12 months, have you been humiliated or emotionally abused in other ways by your partner or ex-partner?: No   Housing Stability: Low Risk  (2/28/2025)    Housing Stability     Do you have housing? : Yes     Are you worried about losing your housing?: No       Family History  Family History   Problem Relation Age of Onset    Gastrointestinal Disease Mother         irritable bowel    Skin Cancer Mother     Prostate Cancer Father         age 60    Cardiovascular Father         aneurysms    Thyroid Disease Sister         hypothyroid    Thyroid Disease Maternal Grandmother         hypothyroid    Osteoporosis Maternal Grandmother     Cancer Maternal Grandfather         non hodgkins lymphoma    Osteoporosis Paternal Grandmother     Cerebrovascular Disease Paternal Grandmother     Cardiovascular Paternal Grandmother     Venous thrombosis Paternal Grandmother     Prostate Cancer Paternal Grandfather     Allergies Daughter         food    Allergies Son         food    Breast Cancer Maternal Aunt     Colon Cancer Maternal Uncle     Breast Cancer Maternal Cousin         1st cousin    Anesthesia Reaction No family hx of        Review of Systems  The complete review of systems is negative other than noted in the HPI or here.   Anesthesia Evaluation   Pt has had prior anesthetic. Type: General and MAC.    History of anesthetic complications  -  and PONV.      ROS/MED HX  ENT/Pulmonary: Comment: History of multiple allergies, follows with allergist for this, asthma, and migraines    (+)                      asthma Last exacerbation: No recent exacerbation,  Treatment: Inhaler prn and Inhaled steroids,              (-) tobacco use and recent URI   Neurologic: Comment: NF 1, followed by Dr. Hernadez  ADHD    (+)       migraines, seizures, last seizure: 7th grade after a fall,                     (-) no CVA   Cardiovascular:     (+)  - -   -  - -                                 No previous cardiac testing  (-) taking anticoagulants/antiplatelets, BALLARD and arrhythmias   METS/Exercise Tolerance: >4 METS Comment: Vigorous exercise without exertional symptoms   Hematologic:    (-) history of blood clots and history of blood transfusion   Musculoskeletal:  - neg musculoskeletal ROS     GI/Hepatic: Comment: Occ use of TUMS      Renal/Genitourinary:  - neg Renal ROS     Endo:    (-) Type II DM   Psychiatric/Substance Use:  - neg psychiatric ROS     Infectious Disease:    (-) Recent Fever   Malignancy:   (+) Malignancy, History of Breast.Breast CA Remission status post Surgery.      Other:  - neg other ROS          Virtual visit -  No vitals were obtained    Physical Exam  Constitutional: Awake, alert, no apparent distress, and appears stated age.  HENT: Normocephalic  Respiratory: non labored breathing: no cough   Neurologic: Oriented to name, place and time.   Neuropsychiatric: Calm, cooperative. Normal affect.      Prior Labs/Diagnostic Studies   All labs and imaging personally reviewed  Lab Results   Component Value Date    WBC 6.1 02/03/2025    WBC 9.6 04/03/2009     Lab Results   Component Value Date    RBC 4.73 02/03/2025    RBC 4.82 04/03/2009     Lab Results   Component Value Date    HGB 13.1 02/03/2025    HGB 13.9 04/03/2009     Lab Results   Component Value Date    HCT 39.9 02/03/2025    HCT 41.3 04/03/2009     Lab Results   Component Value Date    MCV 84 02/03/2025    MCV 86 04/03/2009     Lab Results   Component Value Date    MCH 27.7 02/03/2025    MCH 28.8 04/03/2009     Lab Results   Component Value Date    MCHC 32.8 02/03/2025    MCHC 33.6 04/03/2009     Lab Results   Component Value Date    RDW 13.7 02/03/2025    RDW 12.7 04/03/2009     Lab Results   Component Value Date     02/03/2025     04/03/2009       Last Comprehensive Metabolic Panel:  Sodium   Date Value Ref Range Status   02/03/2025 143 135 - 145 mmol/L Final     Potassium   Date Value Ref Range Status   02/03/2025 3.7 3.4 - 5.3 mmol/L Final     Chloride   Date Value Ref Range Status   02/03/2025 107 98 - 107 mmol/L Final     Carbon Dioxide (CO2)   Date Value Ref Range Status   02/03/2025 27 22 - 29 mmol/L Final     Anion Gap   Date Value Ref Range Status   02/03/2025 9 7 - 15 mmol/L Final     Glucose   Date Value Ref Range Status   02/03/2025 88 70 - 99 mg/dL Final     Urea Nitrogen   Date Value Ref Range Status   02/03/2025 14.8 6.0 - 20.0 mg/dL Final     Creatinine   Date Value Ref Range Status   02/03/2025 0.66 0.51 - 0.95 mg/dL Final     GFR Estimate   Date Value Ref Range Status   02/03/2025 >90 >60 mL/min/1.73m2 Final     Comment:     eGFR calculated using 2021 CKD-EPI equation.     Calcium   Date Value Ref Range Status   02/03/2025 8.8 8.8 - 10.4 mg/dL Final     Bilirubin Total   Date Value Ref Range Status   02/03/2025 <0.2 <=1.2 mg/dL Final     Alkaline Phosphatase   Date Value Ref Range Status   02/03/2025 45 40 - 150 U/L Final     ALT   Date Value Ref Range Status   02/03/2025 14 0 - 50 U/L Final     AST   Date Value Ref Range Status   02/03/2025 20 0 - 45 U/L Final     EKG: Not indicated    Pelvis US 3/10/25    Impression: Uterus with 3 fibroids, 7.0, 3.3 and 2.5 cm as noted above.  The endometrium is thickened, especially at the fundus.  Right ovary is normal appearing, the left is not visualized.   Further imaging as clinically indicated.       The patient's records and results personally reviewed by this provider.     Outside records reviewed from: Care Everywhere      Assessment    Philippe Medellin is a 54 year old female seen as a PAC referral for risk assessment and optimization for anesthesia.    Plan/Recommendations  Pt will be optimized for the proposed procedure.  See below for details on the assessment, risk, and preoperative  recommendations    NEUROLOGY  - No history of TIA, CVA or seizure  Frequent migraine with associated nausea and vision changes. Taking Ajovy, last 5/7/25. PRN use of Imitrex and will hold for 24 hours prior to surgery. Light sensitivity is initial symptom.  Flexeril at bedtime    ADHD Will hold Concerta on DOS    Isolated seizure in 7th grade after fall.  No recurrence  -Post Op delirium risk factors:  No risk identified      ENT  - No current airway concerns.  Will need to be reassessed day of surgery.  Mallampati: Unable to assess  TM: Unable to assess    Anesthesia records available    CARDIAC  BP normal range.  Denies other cardiac history, symptoms, or meds.  Excellent exercise tolerance without exertional symptoms  - METS (Metabolic Equivalents)>4  Patient performs 4 or more METS exercise without symptoms             Total Score: 0      RCRI-Very low risk: Class 1 0.4% complication rate             Total Score: 0      PULMONARY  Multiple allergies, with some improvement over time with food allergies  Zyrtec at HS  Asthma, controlled.  Flovent used at bedtime.  As needed use of ProAir and will bring inhaler on day of surgery  Able to lie flat without difficulty  SUZAN Low Risk             Total Score: 2    SUZAN: Snores loudly    SUZAN: Over 50 ys old        - Tobacco History    History   Smoking Status    Never   Smokeless Tobacco    Never       GI: Occasional use of Tums for heartburn/reflux.  Will hold on day of surgery     PONV High Risk  Total Score: 4           1 AN PONV: Pt is Female    1 AN PONV: Patient is not a current smoker    1 AN PONV: Patient has history of PONV    1 AN PONV: Intended Post Op Opioids      PONV. Significant history and concern for patient. Reports was vomiting in PACU after breast surgery, and nauseated on way home. At time of breast reconstruction surgery, a scopolamine patch was used. Final decisions regarding prophylaxis by Anesthesia on DOS.    /RENAL  - Baseline Creatinine   "0.66    ENDOCRINE    - BMI: Estimated body mass index is 26.7 kg/m  as calculated from the following:    Height as of this encounter: 1.638 m (5' 4.5\").    Weight as of this encounter: 71.7 kg (158 lb).  Overweight (BMI 25.0-29.9)  - No history of Diabetes Mellitus    HEME  VTE High Risk 3%             Total Score: 9    VTE: Family Hx of VTE    VTE: Current cancer      Denies personal or family history of blood clots  Denies history of blood transfusion   Oral iron supplement    Oncology: History of breast cancer as above, s/p surgery.  Continues on tamoxifen at bedtime  At the time of this diagnosis, neurofibromatosis, type 1 was discovered.  Has been following with Dr. Savannah JIMENEZ  Patient is NOT Frail             Total Score: 0      ACCESS: Patient is protecting her left arm from blood pressures, blood draws and IVs.  She also notes that sometimes the automatic BP cuff is painful for her    Different anesthesia methods/types have been discussed with the patient, but they are aware that the final plan will be decided by the assigned anesthesia provider on the date of service.  Patient was discussed with Dr. Romero    The patient is optimized for their procedure. AVS with information on surgery time/arrival time, meds and NPO status given by nursing staff. No further diagnostic testing indicated.    Please refer to the physical examination documented by the anesthesiologist in the anesthesia record on the day of surgery.    Video-Visit Details    Type of service:  Video Visit    Provider received verbal consent for a Video Visit from the patient? Yes   Video Start Time: 12:00pm  Video End Time:12:22pm    Originating Location (pt. Location): Home    Distant Location (provider location):  Off-site  Mode of Communication:  Video Conference via [employer]  On the day of service:     Prep time: 14 minutes  Visit time: 22 minutes  Documentation time: 13 minutes  ------------------------------------------  Total time: 49 " minutes      JOVANI Ibanez CNS  Preoperative Assessment Center  [facility]  Clinic and Surgery Center  Phone: [phone]  Fax: [phone]

## 2025-05-08 NOTE — H&P
Pre-Operative H & P     CC:  Preoperative exam to assess for increased cardiopulmonary risk while undergoing surgery and anesthesia.    Date of Encounter: 5/8/2025  Primary Care Physician:  Trell Nuñez     Reason for visit:   Encounter Diagnoses   Name Primary?    Preop examination Yes    HSIL on Pap smear of cervix     Intramural and submucous leiomyoma of uterus        ALICIA Medellin is a 54 year old female who presents for pre-operative H & P in preparation for  Procedure Information       Case: [id number] Date/Time: 05/13/25 0800    Procedures:       CONE BIOPSY, CERVIX, USING LOOP ELECTROSURGICAL EXCISION PROCEDURE (LEEP) (Cervix)      Hysteroscopy, dilation and curettage with morcellator, myomectomy (Uterus)    Anesthesia type: Choice    Diagnosis:       Intramural and submucous leiomyoma of uterus [D25.1, D25.0]      HSIL on Pap smear of cervix [R87.613]    Pre-op diagnosis:       Intramural and submucous leiomyoma of uterus [D25.1, D25.0]      HSIL on Pap smear of cervix [R87.613]    Location: [place] [address] / [facility]    Providers: Yasmin Greene MD          History is obtained from the patient and chart review    Patient with history of NF1, and malignant neoplasm of upper-outer quadrant of left breast, nuclear grade 3 ER+ (15-20%) DCIS.  She is status post left breast lumpectomy with seed localization sentinel node biopsy on 2/15/2023, with bilateral breast reconstruction on 2/24/2023.  She continues on tamoxifen.     Most recently, she was evaluated by Dr. Greene for history of HSIL pap w/ negative colposcopic biopsies.  Patient's imaging was consistent with benign fibroids, including a 3 cm submucosal fibroid.  Surgical options were discussed, and she was counseled for above LEEP procedure, with attempt at hysteroscopic resection of submucosal fibroid.    Patient's history is otherwise significant for allergies, asthma,  ADHD, and migraine headaches.    For her history of NF1, she is followed by Dr. Nuñez.        Hx of abnormal bleeding or anti-platelet use: Denies    Menstrual history: Patient's last menstrual period was 05/06/2025.     Past Medical History  Past Medical History:   Diagnosis Date    ADD (attention deficit disorder)     diagnosed 1998     Allergies     had allergy testing as a child    Asthma     Breast cancer (H) 01/2023    HSIL on Pap smear of cervix     Intramural and submucous leiomyoma of uterus     Migraine     NF1     PONV (postoperative nausea and vomiting)     Seizures (H)     one seizure in 7th grade after fall, no recurrence       Past Surgical History  Past Surgical History:   Procedure Laterality Date    BREAST SURGERY Bilateral 02/24/2023    Breast reconstruction on the lt and Bilateral reduction    DENTAL SURGERY      INSERT INTRAUTERINE DEVICE      LUMPECTOMY BREAST, SEED LOCALIZATION, SENTINEL NODE Left 02/15/2023    Procedure: LUMPECTOMY, BREAST, WITH RADIOACTIVE SEED LOCALIZATION AND SENTINEL LYMPH NODE BIOPSY;  Surgeon: Noman Diggs MD;  Location: [facility] OR    CA LAP IUD REMOVAL         Prior to Admission Medications  Current Outpatient Medications   Medication Sig Dispense Refill    ammonium lactate (AMLACTIN) 12 % external cream Apply 1 Application topically      cetirizine (ZYRTEC) 10 MG tablet Take 5 mg by mouth at bedtime.  Half tablet daily.      cyclobenzaprine (FLEXERIL) 10 MG tablet Take 2 tablets by mouth every evening.      ferrous sulfate (SLO-FE) 142 (45 Fe) MG CR tablet Take 1 tablet (142 mg) by mouth daily 90 tablet 0    fluconazole (DIFLUCAN) 150 MG tablet One tablet now.  May repeat in 72 hours if needed. 2 tablet 0    fluticasone (FLOVENT HFA) 44 MCG/ACT inhaler Inhale 1 puff into the lungs at bedtime.      ketotifen (ZADITOR) 0.025 % ophthalmic solution Place 1 drop into both eyes as needed      magnesium oxide 200 MG TABS Take 200 mg by mouth daily      methylphenidate  (RITALIN) 20 MG tablet Take 20 mg by mouth every morning      methylphenidate HCl ER, OSM, (CONCERTA) 36 MG CR tablet Take 36 mg by mouth every morning.      mometasone (NASONEX) 50 MCG/ACT nasal spray Spray 1 spray into both nostrils daily.      MULTIVITAMINS OR Take by mouth daily (with lunch)      PROAIR  (90 BASE) MCG/ACT IN AERS Inhale into the lungs as needed      SODIUM FLUORIDE 5000 PPM 1.1 % PSTE dental paste Apply to affected area 2 times daily.      SUMAtriptan (IMITREX) 100 MG tablet Take 2 tablets by mouth at onset of headache for migraine.      SUMAtriptan (IMITREX) 20 MG/ACT nasal spray Spray 1 spray in nostril as needed.      tamoxifen (NOLVADEX) 10 MG tablet TAKE 1/2 TABLET BY MOUTH EVERY DAY (Patient taking differently: Take 5 mg by mouth every evening.) 45 tablet 3    tretinoin (RETIN-A) 0.025 % external cream Apply topically At Bedtime      UNABLE TO FIND Take 1 tablet by mouth daily (with lunch). MEDICATION NAME: Turkey Tail      Vitamin D (Cholecalciferol) 50 MCG (2000 UT) CAPS Take 2 tablets by mouth daily (before lunch).      SHADY SOSLAURI subcutaneous Inject 225 mg subcutaneously every 30 days.         Allergies  Allergies   Allergen Reactions    Bee Pollen Anaphylaxis     Other reaction(s): anaphylaxis  BEE Venom      Dog Epithelium (Canis Lupus Familiaris) Difficulty breathing and Shortness Of Breath     Other reaction(s): difficulty breathing    Walnuts [Nuts] Hives    Doxycycline Rash    Sulfa Antibiotics Rash    Sulfamethoxazole-Trimethoprim      Other reaction(s): Unknown    Trimethoprim Rash    Wasp Venom      Other reaction(s): Unknown       Social History  Social History     Socioeconomic History    Marital status:      Spouse name: Esau    Number of children: 2    Years of education: Not on file    Highest education level: Not on file   Occupational History    Not on file   Tobacco Use    Smoking status: Never    Smokeless tobacco: Never    Tobacco comments:     no  smokers in the household   Substance and Sexual Activity    Alcohol use: Not Currently    Drug use: No    Sexual activity: Yes     Partners: Male     Birth control/protection: I.U.D.   Other Topics Concern    Parent/sibling w/ CABG, MI or angioplasty before 65F 55M? Yes   Social History Narrative    Not on file     Social Drivers of Health     Financial Resource Strain: Low Risk  (2/28/2025)    Financial Resource Strain     Within the past 12 months, have you or your family members you live with been unable to get utilities (heat, electricity) when it was really needed?: No   Food Insecurity: Low Risk  (2/28/2025)    Food Insecurity     Within the past 12 months, did you worry that your food would run out before you got money to buy more?: No     Within the past 12 months, did the food you bought just not last and you didn t have money to get more?: No   Transportation Needs: Low Risk  (2/28/2025)    Transportation Needs     Within the past 12 months, has lack of transportation kept you from medical appointments, getting your medicines, non-medical meetings or appointments, work, or from getting things that you need?: No   Physical Activity: Sufficiently Active (2/28/2025)    Exercise Vital Sign     Days of Exercise per Week: 7 days     Minutes of Exercise per Session: 50 min   Stress: Stress Concern Present (2/28/2025)    [place] [employer] - Occupational Stress Questionnaire     Feeling of Stress : To some extent   Social Connections: Unknown (2/28/2025)    Social Connection and Isolation Panel [NHANES]     Frequency of Communication with Friends and Family: Not on file     Frequency of Social Gatherings with Friends and Family: Twice a week     Attends Latter-day Services: Not on file     Active Member of Clubs or Organizations: Not on file     Attends Club or Organization Meetings: Not on file     Marital Status: Not on file   Interpersonal Safety: Low Risk  (10/18/2024)    Interpersonal  Safety     Do you feel physically and emotionally safe where you currently live?: Yes     Within the past 12 months, have you been hit, slapped, kicked or otherwise physically hurt by someone?: No     Within the past 12 months, have you been humiliated or emotionally abused in other ways by your partner or ex-partner?: No   Housing Stability: Low Risk  (2/28/2025)    Housing Stability     Do you have housing? : Yes     Are you worried about losing your housing?: No       Family History  Family History   Problem Relation Age of Onset    Gastrointestinal Disease Mother         irritable bowel    Skin Cancer Mother     Prostate Cancer Father         age 60    Cardiovascular Father         aneurysms    Thyroid Disease Sister         hypothyroid    Thyroid Disease Maternal Grandmother         hypothyroid    Osteoporosis Maternal Grandmother     Cancer Maternal Grandfather         non hodgkins lymphoma    Osteoporosis Paternal Grandmother     Cerebrovascular Disease Paternal Grandmother     Cardiovascular Paternal Grandmother     Venous thrombosis Paternal Grandmother     Prostate Cancer Paternal Grandfather     Allergies Daughter         food    Allergies Son         food    Breast Cancer Maternal Aunt     Colon Cancer Maternal Uncle     Breast Cancer Maternal Cousin         1st cousin    Anesthesia Reaction No family hx of        Review of Systems  The complete review of systems is negative other than noted in the HPI or here.   Anesthesia Evaluation   Pt has had prior anesthetic. Type: General and MAC.    History of anesthetic complications  -  and PONV.      ROS/MED HX  ENT/Pulmonary: Comment: History of multiple allergies, follows with allergist for this, asthma, and migraines    (+)                      asthma Last exacerbation: No recent exacerbation,  Treatment: Inhaler prn and Inhaled steroids,              (-) tobacco use and recent URI   Neurologic: Comment: NF 1, followed by Dr. Hernadez  ADHD    (+)       migraines, seizures, last seizure: 7th grade after a fall,                     (-) no CVA   Cardiovascular:     (+)  - -   -  - -                                 No previous cardiac testing  (-) taking anticoagulants/antiplatelets, BALLARD and arrhythmias   METS/Exercise Tolerance: >4 METS Comment: Vigorous exercise without exertional symptoms   Hematologic:    (-) history of blood clots and history of blood transfusion   Musculoskeletal:  - neg musculoskeletal ROS     GI/Hepatic: Comment: Occ use of TUMS      Renal/Genitourinary:  - neg Renal ROS     Endo:    (-) Type II DM   Psychiatric/Substance Use:  - neg psychiatric ROS     Infectious Disease:    (-) Recent Fever   Malignancy:   (+) Malignancy, History of Breast.Breast CA Remission status post Surgery.      Other:  - neg other ROS          Virtual visit -  No vitals were obtained    Physical Exam  Constitutional: Awake, alert, no apparent distress, and appears stated age.  HENT: Normocephalic  Respiratory: non labored breathing: no cough   Neurologic: Oriented to name, place and time.   Neuropsychiatric: Calm, cooperative. Normal affect.      Prior Labs/Diagnostic Studies   All labs and imaging personally reviewed  Lab Results   Component Value Date    WBC 6.1 02/03/2025    WBC 9.6 04/03/2009     Lab Results   Component Value Date    RBC 4.73 02/03/2025    RBC 4.82 04/03/2009     Lab Results   Component Value Date    HGB 13.1 02/03/2025    HGB 13.9 04/03/2009     Lab Results   Component Value Date    HCT 39.9 02/03/2025    HCT 41.3 04/03/2009     Lab Results   Component Value Date    MCV 84 02/03/2025    MCV 86 04/03/2009     Lab Results   Component Value Date    MCH 27.7 02/03/2025    MCH 28.8 04/03/2009     Lab Results   Component Value Date    MCHC 32.8 02/03/2025    MCHC 33.6 04/03/2009     Lab Results   Component Value Date    RDW 13.7 02/03/2025    RDW 12.7 04/03/2009     Lab Results   Component Value Date     02/03/2025     04/03/2009       Last Comprehensive Metabolic Panel:  Sodium   Date Value Ref Range Status   02/03/2025 143 135 - 145 mmol/L Final     Potassium   Date Value Ref Range Status   02/03/2025 3.7 3.4 - 5.3 mmol/L Final     Chloride   Date Value Ref Range Status   02/03/2025 107 98 - 107 mmol/L Final     Carbon Dioxide (CO2)   Date Value Ref Range Status   02/03/2025 27 22 - 29 mmol/L Final     Anion Gap   Date Value Ref Range Status   02/03/2025 9 7 - 15 mmol/L Final     Glucose   Date Value Ref Range Status   02/03/2025 88 70 - 99 mg/dL Final     Urea Nitrogen   Date Value Ref Range Status   02/03/2025 14.8 6.0 - 20.0 mg/dL Final     Creatinine   Date Value Ref Range Status   02/03/2025 0.66 0.51 - 0.95 mg/dL Final     GFR Estimate   Date Value Ref Range Status   02/03/2025 >90 >60 mL/min/1.73m2 Final     Comment:     eGFR calculated using 2021 CKD-EPI equation.     Calcium   Date Value Ref Range Status   02/03/2025 8.8 8.8 - 10.4 mg/dL Final     Bilirubin Total   Date Value Ref Range Status   02/03/2025 <0.2 <=1.2 mg/dL Final     Alkaline Phosphatase   Date Value Ref Range Status   02/03/2025 45 40 - 150 U/L Final     ALT   Date Value Ref Range Status   02/03/2025 14 0 - 50 U/L Final     AST   Date Value Ref Range Status   02/03/2025 20 0 - 45 U/L Final     EKG: Not indicated    Pelvis US 3/10/25    Impression: Uterus with 3 fibroids, 7.0, 3.3 and 2.5 cm as noted above.  The endometrium is thickened, especially at the fundus.  Right ovary is normal appearing, the left is not visualized.   Further imaging as clinically indicated.       The patient's records and results personally reviewed by this provider.     Outside records reviewed from: Care Everywhere      Assessment    Philippe Medellin is a 54 year old female seen as a PAC referral for risk assessment and optimization for anesthesia.    Plan/Recommendations  Pt will be optimized for the proposed procedure.  See below for details on the assessment, risk, and preoperative  recommendations    NEUROLOGY  - No history of TIA, CVA or seizure  Frequent migraine with associated nausea and vision changes. Taking Ajovy, last 5/7/25. PRN use of Imitrex and will hold for 24 hours prior to surgery. Light sensitivity is initial symptom.  Flexeril at bedtime    ADHD Will hold Concerta on DOS    Isolated seizure in 7th grade after fall.  No recurrence  -Post Op delirium risk factors:  No risk identified      ENT  - No current airway concerns.  Will need to be reassessed day of surgery.  Mallampati: Unable to assess  TM: Unable to assess    Anesthesia records available    CARDIAC  BP normal range.  Denies other cardiac history, symptoms, or meds.  Excellent exercise tolerance without exertional symptoms  - METS (Metabolic Equivalents)>4  Patient performs 4 or more METS exercise without symptoms             Total Score: 0      RCRI-Very low risk: Class 1 0.4% complication rate             Total Score: 0      PULMONARY  Multiple allergies, with some improvement over time with food allergies  Zyrtec at HS  Asthma, controlled.  Flovent used at bedtime.  As needed use of ProAir and will bring inhaler on day of surgery  Able to lie flat without difficulty  SUZAN Low Risk             Total Score: 2    SUZAN: Snores loudly    SUZAN: Over 50 ys old        - Tobacco History    History   Smoking Status    Never   Smokeless Tobacco    Never       GI: Occasional use of Tums for heartburn/reflux.  Will hold on day of surgery     PONV High Risk  Total Score: 4           1 AN PONV: Pt is Female    1 AN PONV: Patient is not a current smoker    1 AN PONV: Patient has history of PONV    1 AN PONV: Intended Post Op Opioids      PONV. Significant history and concern for patient. Reports was vomiting in PACU after breast surgery, and nauseated on way home. At time of breast reconstruction surgery, a scopolamine patch was used. Final decisions regarding prophylaxis by Anesthesia on DOS.    /RENAL  - Baseline Creatinine   "0.66    ENDOCRINE    - BMI: Estimated body mass index is 26.7 kg/m  as calculated from the following:    Height as of this encounter: 1.638 m (5' 4.5\").    Weight as of this encounter: 71.7 kg (158 lb).  Overweight (BMI 25.0-29.9)  - No history of Diabetes Mellitus    HEME  VTE High Risk 3%             Total Score: 9    VTE: Family Hx of VTE    VTE: Current cancer      Denies personal or family history of blood clots  Denies history of blood transfusion   Oral iron supplement    Oncology: History of breast cancer as above, s/p surgery.  Continues on tamoxifen at bedtime  At the time of this diagnosis, neurofibromatosis, type 1 was discovered.  Has been following with Dr. Savannah JIMENEZ  Patient is NOT Frail             Total Score: 0      ACCESS: Patient is protecting her left arm from blood pressures, blood draws and IVs.  She also notes that sometimes the automatic BP cuff is painful for her    Different anesthesia methods/types have been discussed with the patient, but they are aware that the final plan will be decided by the assigned anesthesia provider on the date of service.  Patient was discussed with Dr. Romero    The patient is optimized for their procedure. AVS with information on surgery time/arrival time, meds and NPO status given by nursing staff. No further diagnostic testing indicated.    Please refer to the physical examination documented by the anesthesiologist in the anesthesia record on the day of surgery.    Video-Visit Details    Type of service:  Video Visit    Provider received verbal consent for a Video Visit from the patient? Yes   Video Start Time: 12:00pm  Video End Time:12:22pm    Originating Location (pt. Location): Home    Distant Location (provider location):  Off-site  Mode of Communication:  Video Conference via [employer]  On the day of service:     Prep time: 14 minutes  Visit time: 22 minutes  Documentation time: 13 minutes  ------------------------------------------  Total time: 49 " minutes      JOVANI Ibanez CNS  Preoperative Assessment Center  [facility]  Clinic and [facility] Center  Phone: [phone]  Fax: [phone]

## 2025-05-08 NOTE — LETTER
2025      Philippe Medellin  [address]  [facility] [address]      Dear Colleague,    Thank you for referring your patient, Philippe Medellin, to the [facility]. Please see a copy of my visit note below.    Virtual Visit Details    Type of service:  Video Visit     Originating Location (pt. Location): Home    Distant Location (provider location):  On-site  Platform used for Video Visit: [facility]        GYNECOLOGIC  ONCOLOGY CLINIC NOTE    Referring provider:    Marc Judd MD  [address] [facility] [address]  [place] [address]   RE: Philippe Medellin  : 1970  CLARIBEL: May 8, 2025      CC: T1 a NO MX invasive ductal carcinoma    HPI: Ms Philippe Medellin is a 54 year old female who presents consult regading planned treatment for abnormal cervical Pap smear.    She was previously seen in our clinic with plan for TLH in late , ultimately decided against this as her DUB improved.     She has since followed up with Dr. Yasmin Greene and discussed TLH, BS. In addition she is now scheduled for LEEP for pap with HSIL.     Reports that she is tolerating Tamoxifen.    Her sister had a hysterectomy, ended up having emergency surgery for diverticulitis and had a colostomy.    2/15/23   Surgery: Seed localized left lumpectomy, SNL left axillary   Path: Invasive ductal carcinoma grade 2, extensive DCIS, margins negative  ER (+60%), MD (+30%), HER2 negtive    23 Consultation with Medical Oncology: discussed adjuvant hormone therapy as ER is weak positive. Consideration of goserlin for ovarian suppression and aromatase inhibitor alternatively performing oophorectomy would remove need for medical hormonal suppression  Under consideration for adjuvant radiation therapy ( consult with Dr. Rodrigues pending)    23   Surgery: Left breast reduction and reconstruction, right breast reduction  Pathology: Benign breast tissue, negative for cancer or  atypia    Genetic Test Results  Invitae Breast Cancer STAT Panel, Common Hereditary Cancers Panel, Add-on Preliminary Evidence Genes for Breast and Gyn Cancers  No Pathogenic Variants were identified  Variant of Uncertain Significance, NF1 c.1586T>C, possibly mosaic- not considered a pathogenic variant    23 Pelvic US  Normal Bilateral ovaries, Uterus with fibroids, fundal at 6.8 4.6 cm and lower uterine segment at 2.4 x 2.3 cm, endometrial stripe at 10 mm    25 HSIL, hrHPV negative  3/6/25 Colposcopy with neg bx ( 4 random) and neg ECC, Endometrial Biopsy negative      OBGYN history and Health Maintenance:        Past Medical History:   Diagnosis Date     ADD (attention deficit disorder)     diagnosed       Allergies     had allergy testing as a child     Asthma      Breast cancer (H) 2023     HSIL on Pap smear of cervix      Intramural and submucous leiomyoma of uterus      Migraine        Past Surgical History:   Procedure Laterality Date     BREAST SURGERY Bilateral 2023    Breast reconstruction on the lt and Bilateral reduction     DENTAL SURGERY       INSERT INTRAUTERINE DEVICE       LUMPECTOMY BREAST, SEED LOCALIZATION, SENTINEL NODE Left 02/15/2023    Procedure: LUMPECTOMY, BREAST, WITH RADIOACTIVE SEED LOCALIZATION AND SENTINEL LYMPH NODE BIOPSY;  Surgeon: Noman Diggs MD;  Location: [facility] OR     [place] LAP IUD REMOVAL            Current Outpatient Medications   Medication Sig Dispense Refill     acetaminophen (TYLENOL) 325 MG tablet Take 2 tablets (650 mg) by mouth every 4 hours as needed for mild pain 50 tablet 0     acetaZOLAMIDE (DIAMOX) 125 MG tablet Take 1 tablet (125 mg) by mouth 2 times daily. Start one day prior to ascent, continue for 2-3 days at higher elevation - to prevent altitude illness 8 tablet 0     ammonium lactate (AMLACTIN) 12 % external cream Apply 1 Application topically       atovaquone-proguanil (MALARONE) 250-100 MG tablet Take 1 tablet by mouth daily.  Start 2 days before travel and continue 7 days after return. 25 tablet 0     cetirizine (ZYRTEC) 10 MG tablet 5 mg.  Half tablet daily.       cyclobenzaprine (FLEXERIL) 10 MG tablet Take 1 tablet by mouth every evening       ferrous sulfate (SLO-FE) 142 (45 Fe) MG CR tablet Take 1 tablet (142 mg) by mouth daily 90 tablet 0     fluconazole (DIFLUCAN) 150 MG tablet One tablet now.  May repeat in 72 hours if needed. 2 tablet 0     ketotifen (ZADITOR) 0.025 % ophthalmic solution Place 1 drop into both eyes as needed       magnesium oxide 200 MG TABS Take 200 mg by mouth daily       methylphenidate (RITALIN) 20 MG tablet Take 20 mg by mouth every morning       MULTIVITAMINS OR Take by mouth daily (with lunch)       PROAIR  (90 BASE) MCG/ACT IN AERS Inhale into the lungs as needed       SODIUM FLUORIDE 5000 PPM 1.1 % PSTE dental paste Apply to affected area 2 times daily.       SUMAtriptan (IMITREX) 100 MG tablet Take 2 tablets by mouth at onset of headache for migraine.       SUMAtriptan (IMITREX) 20 MG/ACT nasal spray Spray 1 spray in nostril as needed.       tamoxifen (NOLVADEX) 10 MG tablet TAKE 1/2 TABLET BY MOUTH EVERY DAY 45 tablet 3     tretinoin (RETIN-A) 0.025 % external cream Apply topically At Bedtime       UNABLE TO FIND daily (with lunch) MEDICATION NAME: Turkey Tail       Vitamin D (Cholecalciferol) 50 MCG (2000 UT) CAPS Take 2 tablets by mouth daily.       ZOLMitriptan (ZOMIG-ZMT) 5 MG ODT Take 5 mg by mouth as needed.           Allergies   Allergen Reactions     Bee Pollen Anaphylaxis     Other reaction(s): anaphylaxis  BEE Venom       Dog Epithelium (Canis Lupus Familiaris) Difficulty breathing and Shortness Of Breath     Other reaction(s): difficulty breathing     Walnuts [Nuts] Hives     Doxycycline Rash     Sulfa Antibiotics Rash     Sulfamethoxazole-Trimethoprim      Other reaction(s): Unknown     Trimethoprim Rash     Wasp Venom      Other reaction(s): Unknown       Social History:  Social  History     Tobacco Use     Smoking status: Never     Smokeless tobacco: Never     Tobacco comments:     no smokers in the household   Substance Use Topics     Alcohol use: Not Currently       Family History:   The patient's family history is notable for   Family History   Problem Relation Age of Onset     Gastrointestinal Disease Mother         irritable bowel     Skin Cancer Mother      Prostate Cancer Father         age 60     Cardiovascular Father         aneurysms     Thyroid Disease Sister         hypothyroid     Thyroid Disease Maternal Grandmother         hypothyroid     Osteoporosis Maternal Grandmother      Cancer Maternal Grandfather         non hodgkins lymphoma     Osteoporosis Paternal Grandmother      Cerebrovascular Disease Paternal Grandmother      Cardiovascular Paternal Grandmother      Venous thrombosis Paternal Grandmother      Prostate Cancer Paternal Grandfather      Allergies Daughter         food     Allergies Son         food     Breast Cancer Maternal Aunt      Colon Cancer Maternal Uncle      Breast Cancer Maternal Cousin         1st cousin     Anesthesia Reaction No family hx of          Physical Exam:   Virtual Visit    Constitutional: appears healthy and in no acute distress.  Neurological: alert and oriented x 3. normal gait, no tremor.      Assessment/Plan: Philippe Medellin is a 54 year old woman with a diagnosis of T1 a NO MX invasive ductal carcinoma, ER positive.     Pre-menopausal status, on Tamoxifen in adjuvant setting.    Scheduled for LEEP as part of work up for HSIL Pap smear with negative cervix biopsy    She is now considering moving forward to total hysterectomy and BSO for genetic testing showing NF1 variant with underlying DUB, recent endometrial biopsy was negative.    She will follow up after LEEP to further discuss indication and plan for total hysterectomy.      Kirstin Lion M.D., MPH,  F.A.C.O.G.  Division of Gynecologic Oncology  [facility]  [place]/[employer] [place]  [phone]      Time: total time spent today, 25, including preparation, review of outside records, face to face counseling, and documentation.         Again, thank you for allowing me to participate in the care of your patient.        Sincerely,        Kirstin Lion MD    Electronically signed

## 2025-05-08 NOTE — PROGRESS NOTES
Virtual Visit Details    Type of service:  Video Visit     Originating Location (pt. Location): Home    Distant Location (provider location):  On-site  Platform used for Video Visit: [facility]        GYNECOLOGIC  ONCOLOGY CLINIC NOTE    Referring provider:    Marc Judd MD  [address] [facility] [address]  [place] [address]   RE: Philippe Medellin  : 1970  CLARIBEL: May 8, 2025      CC: T1 a NO MX invasive ductal carcinoma    HPI: Ms Philippe Medellin is a 54 year old female who presents consult regading planned treatment for abnormal cervical Pap smear.    She was previously seen in our clinic with plan for TLH in late , ultimately decided against this as her DUB improved.     She has since followed up with Dr. Yasmin Greene and discussed TLH, BS. In addition she is now scheduled for LEEP for pap with HSIL.     Reports that she is tolerating Tamoxifen.    Her sister had a hysterectomy, ended up having emergency surgery for diverticulitis and had a colostomy.    2/15/23   Surgery: Seed localized left lumpectomy, SNL left axillary   Path: Invasive ductal carcinoma grade 2, extensive DCIS, margins negative  ER (+60%), ND (+30%), HER2 negtive    23 Consultation with Medical Oncology: discussed adjuvant hormone therapy as ER is weak positive. Consideration of goserlin for ovarian suppression and aromatase inhibitor alternatively performing oophorectomy would remove need for medical hormonal suppression  Under consideration for adjuvant radiation therapy ( consult with Dr. 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, possibly mosaic-  not considered a pathogenic variant    23 Pelvic US  Normal Bilateral ovaries, Uterus with fibroids, fundal at 6.8 4.6 cm and lower uterine segment at 2.4 x 2.3 cm, endometrial stripe at 10 mm    25 HSIL, hrHPV negative  3/6/25 Colposcopy with neg bx ( 4 random) and neg ECC, Endometrial Biopsy negative      OBGYN history and Health Maintenance:        Past Medical History:   Diagnosis Date    ADD (attention deficit disorder)     diagnosed      Allergies     had allergy testing as a child    Asthma     Breast cancer (H) 2023    HSIL on Pap smear of cervix     Intramural and submucous leiomyoma of uterus     Migraine        Past Surgical History:   Procedure Laterality Date    BREAST SURGERY Bilateral 2023    Breast reconstruction on the lt and Bilateral reduction    DENTAL SURGERY      INSERT INTRAUTERINE DEVICE      LUMPECTOMY BREAST, SEED LOCALIZATION, SENTINEL NODE Left 02/15/2023    Procedure: LUMPECTOMY, BREAST, WITH RADIOACTIVE SEED LOCALIZATION AND SENTINEL LYMPH NODE BIOPSY;  Surgeon: Noman Diggs MD;  Location: [facility] OR    [place] LAP IUD REMOVAL            Current Outpatient Medications   Medication Sig Dispense Refill    acetaminophen (TYLENOL) 325 MG tablet Take 2 tablets (650 mg) by mouth every 4 hours as needed for mild pain 50 tablet 0    acetaZOLAMIDE (DIAMOX) 125 MG tablet Take 1 tablet (125 mg) by mouth 2 times daily. Start one day prior to ascent, continue for 2-3 days at higher elevation - to prevent altitude illness 8 tablet 0    ammonium lactate (AMLACTIN) 12 % external cream Apply 1 Application topically      atovaquone-proguanil (MALARONE) 250-100 MG tablet Take 1 tablet by mouth daily. Start 2 days before travel and continue 7 days after return. 25 tablet 0    cetirizine (ZYRTEC) 10 MG tablet 5 mg.  Half tablet daily.      cyclobenzaprine (FLEXERIL) 10 MG tablet Take 1 tablet by mouth every evening      ferrous sulfate (SLO-FE) 142 (45 Fe) MG CR tablet Take 1 tablet  (142 mg) by mouth daily 90 tablet 0    fluconazole (DIFLUCAN) 150 MG tablet One tablet now.  May repeat in 72 hours if needed. 2 tablet 0    ketotifen (ZADITOR) 0.025 % ophthalmic solution Place 1 drop into both eyes as needed      magnesium oxide 200 MG TABS Take 200 mg by mouth daily      methylphenidate (RITALIN) 20 MG tablet Take 20 mg by mouth every morning      MULTIVITAMINS OR Take by mouth daily (with lunch)      PROAIR  (90 BASE) MCG/ACT IN AERS Inhale into the lungs as needed      SODIUM FLUORIDE 5000 PPM 1.1 % PSTE dental paste Apply to affected area 2 times daily.      SUMAtriptan (IMITREX) 100 MG tablet Take 2 tablets by mouth at onset of headache for migraine.      SUMAtriptan (IMITREX) 20 MG/ACT nasal spray Spray 1 spray in nostril as needed.      tamoxifen (NOLVADEX) 10 MG tablet TAKE 1/2 TABLET BY MOUTH EVERY DAY 45 tablet 3    tretinoin (RETIN-A) 0.025 % external cream Apply topically At Bedtime      UNABLE TO FIND daily (with lunch) MEDICATION NAME: Turkey Tail      Vitamin D (Cholecalciferol) 50 MCG (2000 UT) CAPS Take 2 tablets by mouth daily.      ZOLMitriptan (ZOMIG-ZMT) 5 MG ODT Take 5 mg by mouth as needed.           Allergies   Allergen Reactions    Bee Pollen Anaphylaxis     Other reaction(s): anaphylaxis  BEE Venom      Dog Epithelium (Canis Lupus Familiaris) Difficulty breathing and Shortness Of Breath     Other reaction(s): difficulty breathing    Walnuts [Nuts] Hives    Doxycycline Rash    Sulfa Antibiotics Rash    Sulfamethoxazole-Trimethoprim      Other reaction(s): Unknown    Trimethoprim Rash    Wasp Venom      Other reaction(s): Unknown       Social History:  Social History     Tobacco Use    Smoking status: Never    Smokeless tobacco: Never    Tobacco comments:     no smokers in the household   Substance Use Topics    Alcohol use: Not Currently       Family History:   The patient's family history is notable for   Family History   Problem Relation Age of Onset     Gastrointestinal Disease Mother         irritable bowel    Skin Cancer Mother     Prostate Cancer Father         age 60    Cardiovascular Father         aneurysms    Thyroid Disease Sister         hypothyroid    Thyroid Disease Maternal Grandmother         hypothyroid    Osteoporosis Maternal Grandmother     Cancer Maternal Grandfather         non hodgkins lymphoma    Osteoporosis Paternal Grandmother     Cerebrovascular Disease Paternal Grandmother     Cardiovascular Paternal Grandmother     Venous thrombosis Paternal Grandmother     Prostate Cancer Paternal Grandfather     Allergies Daughter         food    Allergies Son         food    Breast Cancer Maternal Aunt     Colon Cancer Maternal Uncle     Breast Cancer Maternal Cousin         1st cousin    Anesthesia Reaction No family hx of          Physical Exam:   Virtual Visit    Constitutional: appears healthy and in no acute distress.  Neurological: alert and oriented x 3. normal gait, no tremor.      Assessment/Plan: Philippe Medellin is a 54 year old woman with a diagnosis of T1 a NO MX invasive ductal carcinoma, ER positive.     Pre-menopausal status, on Tamoxifen in adjuvant setting.    Scheduled for LEEP as part of work up for HSIL Pap smear with negative cervix biopsy    She is now considering moving forward to total hysterectomy and BSO for genetic testing showing NF1 variant with underlying DUB, recent endometrial biopsy was negative.    She will follow up after LEEP to further discuss indication and plan for total hysterectomy.      Kirstin Lion M.D., MPH,  F.A.C.O.G.  Division of Gynecologic Oncology  [facility]/[employer] [place]  [phone]      Time: total time spent today, 25, including preparation, review of outside records, face to face counseling, and documentation.

## 2025-05-08 NOTE — PATIENT INSTRUCTIONS
Preparing for Your Surgery      Name:  Philippe Medellin   MRN:  [id number]   :  1970   Today's Date:  2025     The [place] [employer] [address] Construction Project                                Timeline 2025 -2025    This project will affect travel to the [facility], as well as the [facility].      Please check the [facility] [address] project website for the most up to date information and give yourself additional time to reach your destination.        Arriving for surgery:  Surgery date:  2025  Arrival time:  6:30 am    Please come to:     [facility] - [address] [address]     Parking is available in front of the [facility] building from 5:30AM to 8:00PM.  -  Proceed to the 5th floor to check into the Ambulatory Surgery Center.              >> There will be patient concierges on the 1st and 5th floor, for assistance or an escort, if you would like.              >> Please call [phone] with any questions.    What can I eat or drink?  -  You may eat and drink normally up to 8 hours prior to arrival time. (Until 10:30 pm the night before surgery)  -  You may have clear liquids until 2 hours prior to arrival time. (Until 4:30 am)    Examples of clear liquids:  Water  Clear broth  Juices (apple, white grape, white cranberry  and cider) without pulp  Noncarbonated, powder based beverages  (lemonade and Evan-Aid)  Sodas (Sprite, 7-Up, ginger ale and seltzer)  Coffee or tea (without milk or cream)  Gatorade    -  No Alcohol or cannabis products for at least 24 hours before surgery.     Which medicines can I take?    Hold Aspirin for 7 days before surgery.   Hold Multivitamins for 7 days before surgery.  Hold Supplements for 7 days before surgery.  Hold Ibuprofen (Advil, Motrin) for 3 day(s) before surgery--unless otherwise  directed by surgeon.  Hold Naproxen (Aleve) for 4 days before surgery.    -  DO NOT take these medications the day of surgery:  Ferrous Sulfate (Slo-Fe)  Magnesium Oxide  Methylphenidate (Ritalin)  Methylphenidate HCL ER (Concerta)  Sumatriptan (Imitrex)  Zolmitriptan (Zomig)    -  PLEASE TAKE these medications the day of surgery:  Proair (90 Base) inhaler - bring with you    How do I prepare myself?  - Please take 2 showers (one the night prior to surgery and one the morning of surgery) using Scrubcare or Hibiclens soap.    Use this soap only from the neck to your toes. Avoid genital area      Leave the soap on your skin for one minute--then rinse thoroughly.      You may use your own shampoo and conditioner. No other hair products.   - Please remove all jewelry and body piercings.  - No lotions, deodorants or fragrance.  - No makeup or fingernail polish.   - Bring your ID and insurance card.    -For patients being admitted to the [facility]  Family members are to take the patient belongings with them and place them in the lockers provided in the Family Lounge.  Please limit the items you bring to 1 bag as the lockers are small.      -If you use a CPAP machine, please bring the CPAP machine, tubing, and mask to hospital.    -If you have a Deep Brain Stimulator, Spinal Cord Stimulator, or any Neuro Stimulator device---you must bring the remote control to the hospital.      ALL PATIENTS GOING HOME THE SAME DAY OF SURGERY ARE REQUIRED TO HAVE A RESPONSIBLE ADULT TO DRIVE AND BE IN ATTENDANCE WITH THEM FOR 24 HOURS FOLLOWING SURGERY.    Covid testing policy as of 12/06/2022  Your surgeon will notify and schedule you for a COVID test if one is needed before surgery--please direct any questions or COVID symptoms to your surgeon      Questions or Concerns:    - For any questions regarding the day of surgery or your hospital stay, please contact the Pre Admission Nursing Office at [phone].       - If you have  health changes between today and your surgery, please call your surgeon.       - For questions after surgery, please call your surgeons office.           Current Visitor Guidelines    2 adult visitors for adult patients in the pre op area    If additional visitors come (beyond a patient care attendant or a group home caregiver), the additional visitors will be asked to wait in the main lobby of the hospital    Visiting hours: 8 a.m. to 8:30 p.m.    Patients confirmed or suspected to have symptoms of COVID 19 or flu:     No visitors allowed for adult patients.   Children (under age 18) can have 1 named visitor.     People who are sick or showing symptoms of COVID 19 or flu:    Are not allowed to visit patients--we can only make exceptions in special situations.       Please follow these guidelines for your visit:          Please maintain social distance          Masking is optional--however at times you may be asked to wear a mask for the safety of yourself and others     Clean your hands with alcohol hand . Do this when you arrive at and leave the building and patient room,    And again after you touch your mask or anything in the room.     Go directly to and from the room you are visiting.     Stay in the patient s room during your visit. Limit going to other places in the hospital as much as possible     Leave bags and jackets at home or in the car.     For everyone s health, please don t come and go during your visit. That includes for smoking   during your visit.

## 2025-05-08 NOTE — PROGRESS NOTES
Philippe is a 54 year old who is being evaluated via a billable video visit.    How would you like to obtain your AVS? MyChart  If the video visit is dropped, the invitation should be resent by: Text to cell phone: [phone]        Subjective   Philippe is a 54 year old, presenting for the following health issues:  Pre-Op Exam      HPI          Physical Exam

## 2025-05-09 ENCOUNTER — ANCILLARY PROCEDURE (OUTPATIENT)
Dept: MRI IMAGING | Facility: CLINIC | Age: 55
End: 2025-05-09
Attending: NURSE PRACTITIONER
Payer: MEDICAID

## 2025-05-09 DIAGNOSIS — C50.412 MALIGNANT NEOPLASM OF UPPER-OUTER QUADRANT OF LEFT BREAST IN FEMALE, ESTROGEN RECEPTOR POSITIVE (H): ICD-10-CM

## 2025-05-09 DIAGNOSIS — D05.12 DUCTAL CARCINOMA IN SITU (DCIS) OF LEFT BREAST: ICD-10-CM

## 2025-05-09 DIAGNOSIS — Z17.0 MALIGNANT NEOPLASM OF UPPER-OUTER QUADRANT OF LEFT BREAST IN FEMALE, ESTROGEN RECEPTOR POSITIVE (H): ICD-10-CM

## 2025-05-09 PROCEDURE — 77049 MRI BREAST C-+ W/CAD BI: CPT

## 2025-05-09 PROCEDURE — A9585 GADOBUTROL INJECTION: HCPCS | Mod: JZ

## 2025-05-09 RX ORDER — GADOBUTROL 604.72 MG/ML
7.5 INJECTION INTRAVENOUS ONCE
Status: COMPLETED | OUTPATIENT
Start: 2025-05-09 | End: 2025-05-09

## 2025-05-09 RX ORDER — GADOBUTROL 604.72 MG/ML
7.5 INJECTION INTRAVENOUS ONCE
OUTPATIENT
Start: 2025-05-09 | End: 2025-05-09

## 2025-05-09 RX ADMIN — GADOBUTROL 7.5 ML: 604.72 INJECTION INTRAVENOUS at 12:28

## 2025-05-09 NOTE — DISCHARGE INSTRUCTIONS
MRI Contrast Discharge Instructions    The IV contrast you received today will pass out of your body in your  urine. This will happen in the next 24 hours. You will not feel this process.  Your urine will not change color.    Drink at least 4 extra glasses of water or juice today (unless your doctor  has restricted your fluids). This reduces the stress on your kidneys.  You may take your regular medicines.    If you are on dialysis: It is best to have dialysis today.    If you have a reaction: Most reactions happen right away. If you have  any new symptoms after leaving the hospital (such as hives or swelling),  call your hospital at the correct number below. Or call your family doctor.  If you have breathing distress or wheezing, call 911.    Special instructions: ***    I have read and understand the above information.    Signature:______________________________________ Date:___________    Staff:__________________________________________ Date:___________     Time:__________    [place] [facility] Departments:    ___[place]: [phone]  ___[facility]: [phone]  ___[place]: [phone] ___[facility]: [phone]  ___[facility]: [phone]  ___[facility]: [phone]  ___[phone]  ___[facility]: [phone]  ___[phone]

## 2025-05-11 NOTE — PROGRESS NOTES
MEDICAL ONCOLOGY FOLLOW UP NOTE     Philippe Tom  Female, 52 year old, 1970  MRN:   [id number]        Dear Dr. Diggs,     Thank you for referring Philippe Tom regarding a newly diagnosed DCIS with a microinvasive component.     PROBLEM: Diagnosis of left sided DCIS with invasive component     HISTORY OF PRESENT ILLNESS: Ms. Tom is a 54 year old woman from [place], [place] with a recent diagnosis of left breast Nuclear Grade 3 ER+(15-20%) DCIS with less than 1 mm of invasive disease as part of workup following routine screening mammogram that detected calcifications. She has not undergone surgical management as of yet, and is establishing care at the [facility] with Medical Oncology, and will be seeing Dr. Noman Diggs for surgical consultation on 1/27/2023.      Her oncologic diagnosis came to light after routine screening mammogram on 12/27/2022 demonstrated indeterminate grouped versus clustered fine microcalcifcations at the 2 o'clock position of the left breast at mid depth. Of note, she does have a history of previous breast biopsies in the right breast on 12/10/2015 that detected fibroadenomas at the 8 o'clock position, 5 cm from the nipple measuring 11 x 6 x 10 mm, and at the 10 o'clock position, 7 cm from the nipple measuring 8 x 4 x 7 mm. Within the 8 o'clock position, rare calcifications were noted adjacent to the fibroadenoma. She also had a diagnostic mammogram for a palpable lump in the left breast on 5/21/2019 at the 2 o'clock position. Focused left breast ultrasound demonstrated an anechoic cyst at the 2 o'clock psition, 8 cm from the nipple measuring 1.7 x 1.2 x 1.5 cm, noted to be benign. An additional anechoic cyst was noted at 1:30 position, 7 cm from the nipple measurign up to 8 mm and routine yearly mammography was recommended.     She underwent stereotactic guided biopsy at [facility] on 1/10/2023 with pathology (Specimen -[id number])  demonstrating Nuclear Grade 3 ER+ (11-20%, weak) DCIS with central comedonecrosis, with less than 1 mm of invasive disease noted within the specimen.      She was seen by Dr. Kelly Garcia for surgical consultation on 2023 who discussed surgical options with the patient. Note unavailable in care everywhere    She saw Medical Genetics at [facility] on 2023, seeing Sasha Chacko. [facility]'s STAT breast panel was sent.      SUMMARY OF OUTSIDE STUDIES:  Patient had bilateral screening mammograms on 22 and indeterminate grouped versus clustered fine microcalcifications at the 2:00 position of the left breast, middle depth. Recommend correlation with a mediolateral view and spot magnification CC and MLO views of the left breast for further evaluation. Diagnostic imaging followed on  and clustered microcalcifications in the upper outer aspect of the left breast are suspicious of malignancy. Recommend stereotactically guided biopsy.      1/10/23 - Left breast, stereotactic needle core biopsy at 2:00:  Biopsy at [facility] [place] - pathology report needed.   Left breast, needle core biopsy at 2:00: Ductal carcinoma in situ with   calcifications and at least microinvasion.    Patient states this was left invasive ductal carcinoma,  ER+    <1mm of invasive -- micro  23 - She did meet with Karley Garcia - consult note needed  Genetic counseling consult scheduled for  at [facility] - consult note needed.        Breast Cancer Risk Factors:   Germline c.1586T>C([id number]). This mutation is listed in CLINVAR as likely pathogenic as of 25.     Breast biopsies: 2 breast biopsies on the right breast that demonstrated fibroadenomas  OCPs: duration of 10 years in college  Menarche: around 7th grade, estimated at 15 years old  Gestational history: 3 pregnancies, 2 live births at 30 years and 32 years, 1  at 20 years  Currently using Mirena IUD for significant  bleeding from fibroids. Estimated LMP is 2023  No first degree relatives with breast or ovarian cancer.  She does have a family history of a maternal great aunt and maternal first cousin with breast cancer.     Menstrual history: Menarche age 15.  First pregnancy age 20.  Ended in .  Pregnancies age 30 and 32 with live births.  She has fibroids has a Mirena IUD with light periods.  She did have a history of heavy menstrual bleeding related to the fibroids, before the Mirena IUD was placed.  No history of hormone replacement therapy.         PMH:   Attention deficit disorder  Allergies  Migraines  Fibroids  The patient does not have a history of low bone density, diabetes mellitus, hyperlipidemia, or hypertension     She has no history of breast surgery, breast cancer in the past, radiation therapy in the past, radiation exposure in the past, tumor of any kind, heart problems, heart attack, breathing problems, blood clots, seizures, arthritis, peptic ulcer disease, osteoporosis, bone fractures.  She is not currently participating in a clinical trial.     PSH:  Intrauterine device insertion     ALLERGY:  Bee pollen  Walnuts  Doxycycline  Sulfa  Wasps       Dog hair  - She follows with Dr. Rufus Cardenas at [facility]     FAMILY HISTORY:  Mother with skin cancer on the leg at 65 years  Father with prostate cancer at 62 years old, currently alive, no recurrence [id number]. [place] 3+4.  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 The last noted menstrual cycle completed on January 22, 2023.      GENETICS:  NF1 c.1586T>C (p.[id number]) possibly mosaic Uncertain Significance.  Please note that this genetics result is from peripheral blood and is germline.  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  Discussed with Dr. Fortino Nuñez.     She had [facility] testing for 57 breast cancer risk genes that included BRCA1, BRCA2, NF1, and others.  The testing was negative for all 57 genes but a variant of unknown certain significance was found for NF1.     PATHOLOGY:  A. LEFT BREAST, SEED-LOCALIZED LUMPECTOMY:  - INVASIVE DUCTAL CARCINOMA, Grover grade 2, measuring 2.5 mm in greatest extent.  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  - Margins are negative for invasive carcinoma; closest uninvolved anterior margin is 6 mm.  - Margins are negative for DCIS; closest uninvolved inferior and medial margins are 1.5 mm.  - AJCC pathologic staging is pT1a pN0(sn).  - Biopsy marker and biopsy site changes present.  - Fibrocystic change and columnar cell change with associated calcifications.  - Invasive carcinoma is ER (60%, weak) and VT (30%, moderate) positive, HER2 IHC is pending and the result will be reported in an addendum.     B. LEFT BREAST, NEW ANTERIOR MARGIN, EXCISION:  - Benign breast tissue with fibrocystic change, sclerosis adenosis and associated calcifications.  - Negative for atypia or malignancy.      C. LEFT BREAST, NEW LATERAL MARGIN, EXCISION:  - Benign breast tissue with fibrocystic change.  - Negative for atypia or malignancy.     D. LEFT BREAST, NEW MEDIAL MARGIN, EXCISION:  - Benign breast tissue with fibrocystic change, columnar cell change and associated calcifications.  - Negative for atypia or malignancy.     E. SENTINEL LYMPH NODE, LEFT AXILLA, BIOPSY #1:  - One lymph node, negative for malignancy (0/1).     F. SENTINEL LYMPH NODE, LEFT AXILLA, BIOPSY #2:  - One lymph node, negative for malignancy (0/1).     TREATMENT HISTORY:  A.  Diagnosis with [id number] invasive ductal cancer of the left breast.  - Invasive carcinoma is ER (60%, weak) and GA (30%, moderate) positive,  HER2 negative 1+  B.  Mirana IUD removed.  C.  Lumpectomy WOODY quadrant.   D.  Bilateral mammoplasty breast reduction.  Follow up with Dr. Arnold.  E.  Radiation  Treatment Summary:  Radiation Oncology - Course: 1         Protocol:   Treatment Site            Current Dose   Modality           From    To        Elapsed Days  Fx.  1 Left Breast     4,240 cGy       06 X                  4/03/2023        4/28/2023        25       16                                                Dose per Fraction: 265 cGy   Total Dose:      4240 cGy  F.  Hormonal therapy with tamoxifen 5 mg per day begun 5-2-23.  She does not want regular dosing of 20 mg per day.  There are issues with fibroids and heavy periods. She did have some difficulty tolerating 5 mg per day.   G.    A. Breast, left, reduction mammoplasty:  --Benign breast tissue with nonproliferative fibrocystic change, 249.7 grams.  --Negative for carcinoma or atypical breast proliferative disease.  B. Breast, right, reduction mammoplasty:  --Benign breast tissue with fibrocystic change including usual ductal hyperplasia, 299.2 grams.  --Negative for carcinoma or atypical breast proliferative disease.         INTERVAL HISTORY:    Philippe  affirmed that she prefers tamoxifen 5 mg daily because of  "concerns about side effects from this medication and we will stay at 5 mg/day.  She continues to do well with minimal side effects.  She was seen today with her  Esau Medellin.    She continues to have periods and is premenopausal at this time.  She has talked with Dr. Lion about possible oophorectomy.  She does have some discomfort in her hands with some stiffness related to the tamoxifen.   She denies fatigue, depression, anxiety.     She did have a LEEP procedure this morning.  She did discuss with Dr. Lion the possibility of CLEM/BSO.    Her breast MRI performed 5/9/2025 was BI-RADS 2.    She does have significant migraines and had 10 migraines in the last month.     REVIEW OF SYSTEMS: A 10 point review of systems was negative.     PHYSICAL EXAM:  VITALS:  /69   Pulse 78   Temp 97  F (36.1  C) (Temporal)   Resp 16   Ht 1.638 m (5' 4.49\")   Wt 72.1 kg (159 lb)   LMP 05/06/2025   SpO2 98%   BMI 26.88 kg/m    General: She appears generally well.  Oropharynx: Good dentition no lesions lymph:  No cervical supraclavicular subclavicular or axillary lymphadenopathy  Breast exam: She has no breast complaints and breast exam was deferred at her request.  Lungs: Clear to percussion auscultation  Heart: Regular rate and rhythm S1-S2.  Abdomen: Soft nontender without hepatosplenomegaly.  Extremities: Without edema.  Mood and affect were normal.     LABS: CBC and CMP were within normal limits except for calcium of 8.3 and a glucose of 105.    IMAGING:  Breast MRI May 7, 2025.    Breast composition: Almost entirely fat     Background parenchymal enhancement 1st post C image: Mild     Findings: There are changes of breast conservation therapy on the  left. No suspicious finding is identified in either breast. There is  no lymphadenopathy.                                                                   IMPRESSION: BI-RADS CATEGORY: 2 - Benign.     RECOMMENDED FOLLOW-UP: Continued age and risk based " screening     JOSELINE CONROY MD         PATHOLOGY:  Cervical biopsy: High-grade squamous intraepithelial lesion (HSIL) encompassing mod/severe dysplasia, CIS, CIN2, CIN3   .        ASSESSMENT AND PLAN:   Philippe Tom is a 54 year old pre-menopausal woman with a recent diagnosis of a left breast  nuclear grade 3 DCIS with a 2.5 mm invasive component.  [id number] ER+ HER2-.  ECOG 0.   She underwent left upper outer quadrant lumpectomy and then had bilateral breast reduction. Philippe is seen today approximately 4 months after the completion of radiation.  There are pigmentation changes and skin thickening of the left breast. Radiation to left breast.  Completed April 28.    Screening adjuvant hormonal therapy. T1a invasive compoinent.  She has tolerated tamoxifen 5 mg/day with some hot flashes and disruption of sleep but otherwise has been tolerating it reasonably well.  Although 20 mg of tamoxifen is sweats recommended she wants to stay with 5 mg/day because its better tolerated.  The plan is to continue tamoxifen for total of 10 years.  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  This mutation is listed in CLINVAR as likely pathogenic as of 5-13-25.  This being said she has hide breast density and has other features that supply intensive surveillance.  She had concerns about the germline NF1 mutation affecting the peripheral blood.  Notably she is BRCA1  and 2 mutation negative.  She wanted to know whether the NF1 mutation is germline or mosaic and Dr. Fortino Nuñez thinks that the mutation is germline.    Intensive surveillance because of the pathogenic NF1 mutation.  I discussed that we discussed that it makes sense to do intensive surveillance for breast cancer with mammography and MRI each performed yearly and spaced 6 months apart.  Her MRI this   LEEP procedure today for high grade cervical  neoplasia.  She had concerns about the germline  Fibroids.  She has less vaginal bleeding and none between periods.  This problem appears to be resolving.  Colonoscopy was performed at [facility].  I asked her to forward the colonoscopy report to us.  Consideration of hysterectomy and oophorectomy.  With fibroids hysterectomy would be reasonable.  As far as oophorectomy is concerned is not clear that NF1 is a risk factor for ovarian cancer.  In terms of breast cancer control 1 could certainly undergo oophorectomy and then begin aromatase inhibitor.  If she has an oophorectomy she could take an aromatase inhibitor which is more effective than tamoxifen but this could be considered an extensive hormonal maneuver where the tamoxifen alone may suffice.  The 20-year risk of distant recurrence with 5 years of tamoxifen is about 13% from the Foster overview.  Tamoxifen could reduce the risk of recurrence by about one third and aromatase inhibitor by about a half.  Recommendations of exercise of 150 minutes/week based on the lace and pathway study.  Recommendation of a Mediterranean style diet low in saturated fat but not low in fat with more fruits and vegetables and less red meat.   Recommendation for bone health includes vitamin D3 2000 international units daily and calcium 1 g/day either by diet or reading labels.  We also recommend bone strengthening exercises including stretch band hand weights and yoga.  Migraines, associated with menses.    Colonoscopy at [facility] in April. One polyp  5 years follow up. Sister had 3 polyps.   1 study indicates unfavorable features in NF1 associated breast cancer.  This could make an argument for a total of 10 years of adjuvant hormonal therapy.  Anneliese BASHIR, Marla RDZ, Tessa S, Edgardo MALAVE, Tremaine YING, Annette P, Hung P, Angelica R, Yvonne O, Rosanna MALAVE, Deniz S, Deniz J. 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 Br J Cancer. 2017 Jan 17;116(2):211-217.  If Philippe notices any new lumps or bumps, she is encouraged to contact the clinic.  Scheduled for LEEP as part of work up for HSIL Pap smear with negative cervix biopsy.  She is now considering moving forward to total hysterectomy and BSO for genetic testing showing NF1 variant with underlying DUB, recent endometrial biopsy was negative.  She will follow up after LEEP to further discuss indication and plan for total hysterectomy.  She also notes concern about her risk of uterine and ovarian cancers. Philippe notes a leiomyoma. She has also been having breakthrough bleeding between periods. In a recent pap smear, H cells were revealed in her cervix, but a biopsy afterwards came back negative. Philippe was previously a candidate for a hysterectomy, but decided against the procedure after reading more about it and noting lessened thyroid symptoms. We assured her that her leiomyoma does not increase her risk for uterine and ovarian cancers.   Follow up.  Follow-up with Gillian in August 7.   Return to see me December 10 with CBC, CMP, mammogram.      Thank you for allowing us to participate in this patient's care.     Sincerely,      Marc Judd MD  Professor  [facility]  [phone]        I spent 45 minutes with the patient more than 50% of which was in counseling and coordination of care.

## 2025-05-13 ENCOUNTER — RESULTS FOLLOW-UP (OUTPATIENT)
Dept: ONCOLOGY | Facility: CLINIC | Age: 55
End: 2025-05-13

## 2025-05-13 ENCOUNTER — ONCOLOGY VISIT (OUTPATIENT)
Dept: ONCOLOGY | Facility: CLINIC | Age: 55
End: 2025-05-13
Attending: NURSE PRACTITIONER
Payer: MEDICAID

## 2025-05-13 ENCOUNTER — HOSPITAL ENCOUNTER (OUTPATIENT)
Facility: AMBULATORY SURGERY CENTER | Age: 55
Discharge: HOME | End: 2025-05-13
Attending: STUDENT IN AN ORGANIZED HEALTH CARE EDUCATION/TRAINING PROGRAM | Admitting: STUDENT IN AN ORGANIZED HEALTH CARE EDUCATION/TRAINING PROGRAM
Payer: MEDICAID

## 2025-05-13 ENCOUNTER — ANESTHESIA (OUTPATIENT)
Dept: SURGERY | Facility: AMBULATORY SURGERY CENTER | Age: 55
End: 2025-05-13
Payer: MEDICAID

## 2025-05-13 ENCOUNTER — APPOINTMENT (OUTPATIENT)
Dept: LAB | Facility: CLINIC | Age: 55
End: 2025-05-13
Attending: INTERNAL MEDICINE
Payer: MEDICAID

## 2025-05-13 VITALS
HEART RATE: 78 BPM | RESPIRATION RATE: 16 BRPM | TEMPERATURE: 97 F | BODY MASS INDEX: 27.14 KG/M2 | SYSTOLIC BLOOD PRESSURE: 108 MMHG | HEIGHT: 64 IN | WEIGHT: 159 LBS | DIASTOLIC BLOOD PRESSURE: 69 MMHG | OXYGEN SATURATION: 98 %

## 2025-05-13 VITALS
RESPIRATION RATE: 16 BRPM | BODY MASS INDEX: 27.14 KG/M2 | DIASTOLIC BLOOD PRESSURE: 69 MMHG | TEMPERATURE: 97 F | WEIGHT: 159 LBS | SYSTOLIC BLOOD PRESSURE: 108 MMHG | OXYGEN SATURATION: 98 % | HEART RATE: 78 BPM | HEIGHT: 64 IN

## 2025-05-13 DIAGNOSIS — Z17.0 MALIGNANT NEOPLASM OF UPPER-OUTER QUADRANT OF LEFT BREAST IN FEMALE, ESTROGEN RECEPTOR POSITIVE (H): Primary | ICD-10-CM

## 2025-05-13 DIAGNOSIS — C50.412 MALIGNANT NEOPLASM OF UPPER-OUTER QUADRANT OF LEFT BREAST IN FEMALE, ESTROGEN RECEPTOR POSITIVE (H): Primary | ICD-10-CM

## 2025-05-13 DIAGNOSIS — Z00.00 HEALTH CARE MAINTENANCE: ICD-10-CM

## 2025-05-13 DIAGNOSIS — R87.613 HSIL ON PAP SMEAR OF CERVIX: ICD-10-CM

## 2025-05-13 DIAGNOSIS — D25.1 INTRAMURAL AND SUBMUCOUS LEIOMYOMA OF UTERUS: ICD-10-CM

## 2025-05-13 DIAGNOSIS — R87.613 HSIL (HIGH GRADE SQUAMOUS INTRAEPITHELIAL LESION) ON PAP SMEAR OF CERVIX: Primary | ICD-10-CM

## 2025-05-13 DIAGNOSIS — Z15.89 MONOALLELIC MUTATION OF NF1 GENE: ICD-10-CM

## 2025-05-13 DIAGNOSIS — D25.0 INTRAMURAL AND SUBMUCOUS LEIOMYOMA OF UTERUS: ICD-10-CM

## 2025-05-13 LAB
ALBUMIN SERPL BCG-MCNC: 3.9 G/DL (ref 3.5–5.2)
ALP SERPL-CCNC: 40 U/L (ref 40–150)
ALT SERPL W P-5'-P-CCNC: 14 U/L (ref 0–50)
ANION GAP SERPL CALCULATED.3IONS-SCNC: 11 MMOL/L (ref 7–15)
AST SERPL W P-5'-P-CCNC: 21 U/L (ref 0–45)
BASOPHILS # BLD AUTO: 0 10E3/UL (ref 0–0.2)
BASOPHILS NFR BLD AUTO: 0 %
BILIRUB SERPL-MCNC: 0.2 MG/DL
BUN SERPL-MCNC: 16.1 MG/DL (ref 6–20)
CALCIUM SERPL-MCNC: 8.3 MG/DL (ref 8.8–10.4)
CHLORIDE SERPL-SCNC: 107 MMOL/L (ref 98–107)
CHOLEST SERPL-MCNC: 151 MG/DL
CREAT SERPL-MCNC: 0.59 MG/DL (ref 0.51–0.95)
EGFRCR SERPLBLD CKD-EPI 2021: >90 ML/MIN/1.73M2
EOSINOPHIL # BLD AUTO: 0.1 10E3/UL (ref 0–0.7)
EOSINOPHIL NFR BLD AUTO: 2 %
ERYTHROCYTE [DISTWIDTH] IN BLOOD BY AUTOMATED COUNT: 13.5 % (ref 10–15)
FASTING STATUS PATIENT QL REPORTED: YES
GLUCOSE SERPL-MCNC: 105 MG/DL (ref 70–99)
HCO3 SERPL-SCNC: 23 MMOL/L (ref 22–29)
HCT VFR BLD AUTO: 38.6 % (ref 35–47)
HDLC SERPL-MCNC: 50 MG/DL
HGB BLD-MCNC: 12.5 G/DL (ref 11.7–15.7)
IMM GRANULOCYTES # BLD: 0 10E3/UL
IMM GRANULOCYTES NFR BLD: 0 %
LDLC SERPL CALC-MCNC: 88 MG/DL
LYMPHOCYTES # BLD AUTO: 0.7 10E3/UL (ref 0.8–5.3)
LYMPHOCYTES NFR BLD AUTO: 14 %
MCH RBC QN AUTO: 27.7 PG (ref 26.5–33)
MCHC RBC AUTO-ENTMCNC: 32.4 G/DL (ref 31.5–36.5)
MCV RBC AUTO: 86 FL (ref 78–100)
MONOCYTES # BLD AUTO: 0.2 10E3/UL (ref 0–1.3)
MONOCYTES NFR BLD AUTO: 3 %
NEUTROPHILS # BLD AUTO: 4.2 10E3/UL (ref 1.6–8.3)
NEUTROPHILS NFR BLD AUTO: 81 %
NONHDLC SERPL-MCNC: 101 MG/DL
NRBC # BLD AUTO: 0 10E3/UL
NRBC BLD AUTO-RTO: 0 /100
PERFORMING LABORATORY: NORMAL
PLATELET # BLD AUTO: 184 10E3/UL (ref 150–450)
POTASSIUM SERPL-SCNC: 3.9 MMOL/L (ref 3.4–5.3)
PROT SERPL-MCNC: 6.6 G/DL (ref 6.4–8.3)
RBC # BLD AUTO: 4.51 10E6/UL (ref 3.8–5.2)
SODIUM SERPL-SCNC: 141 MMOL/L (ref 135–145)
SPECIMEN STATUS: NORMAL
TEST NAME: NORMAL
TRIGL SERPL-MCNC: 63 MG/DL
WBC # BLD AUTO: 5.2 10E3/UL (ref 4–11)

## 2025-05-13 PROCEDURE — 57522 CONIZATION OF CERVIX: CPT | Mod: GC | Performed by: STUDENT IN AN ORGANIZED HEALTH CARE EDUCATION/TRAINING PROGRAM

## 2025-05-13 PROCEDURE — 85025 COMPLETE CBC W/AUTO DIFF WBC: CPT | Performed by: INTERNAL MEDICINE

## 2025-05-13 PROCEDURE — 88307 TISSUE EXAM BY PATHOLOGIST: CPT | Mod: 26 | Performed by: PATHOLOGY

## 2025-05-13 PROCEDURE — 88305 TISSUE EXAM BY PATHOLOGIST: CPT | Mod: TC | Performed by: STUDENT IN AN ORGANIZED HEALTH CARE EDUCATION/TRAINING PROGRAM

## 2025-05-13 PROCEDURE — 36415 COLL VENOUS BLD VENIPUNCTURE: CPT | Performed by: INTERNAL MEDICINE

## 2025-05-13 PROCEDURE — 82565 ASSAY OF CREATININE: CPT | Performed by: INTERNAL MEDICINE

## 2025-05-13 PROCEDURE — 88305 TISSUE EXAM BY PATHOLOGIST: CPT | Mod: 26 | Performed by: PATHOLOGY

## 2025-05-13 PROCEDURE — 82465 ASSAY BLD/SERUM CHOLESTEROL: CPT | Performed by: INTERNAL MEDICINE

## 2025-05-13 PROCEDURE — 57522 CONIZATION OF CERVIX: CPT

## 2025-05-13 RX ORDER — NALOXONE HYDROCHLORIDE 0.4 MG/ML
0.1 INJECTION, SOLUTION INTRAMUSCULAR; INTRAVENOUS; SUBCUTANEOUS
Status: DISCONTINUED | OUTPATIENT
Start: 2025-05-13 | End: 2025-05-13 | Stop reason: HOSPADM

## 2025-05-13 RX ORDER — LIDOCAINE 40 MG/G
CREAM TOPICAL
Status: DISCONTINUED | OUTPATIENT
Start: 2025-05-13 | End: 2025-05-13 | Stop reason: HOSPADM

## 2025-05-13 RX ORDER — ONDANSETRON 2 MG/ML
4 INJECTION INTRAMUSCULAR; INTRAVENOUS EVERY 30 MIN PRN
Status: DISCONTINUED | OUTPATIENT
Start: 2025-05-13 | End: 2025-05-13 | Stop reason: HOSPADM

## 2025-05-13 RX ORDER — ONDANSETRON 4 MG/1
4 TABLET, ORALLY DISINTEGRATING ORAL EVERY 30 MIN PRN
Status: DISCONTINUED | OUTPATIENT
Start: 2025-05-13 | End: 2025-05-14 | Stop reason: HOSPADM

## 2025-05-13 RX ORDER — IBUPROFEN 200 MG
800 TABLET ORAL ONCE
Status: DISCONTINUED | OUTPATIENT
Start: 2025-05-13 | End: 2025-05-14 | Stop reason: HOSPADM

## 2025-05-13 RX ORDER — ACETAMINOPHEN 325 MG/1
975 TABLET ORAL ONCE
Status: DISCONTINUED | OUTPATIENT
Start: 2025-05-13 | End: 2025-05-14 | Stop reason: HOSPADM

## 2025-05-13 RX ORDER — FENTANYL CITRATE 50 UG/ML
INJECTION, SOLUTION INTRAMUSCULAR; INTRAVENOUS PRN
Status: DISCONTINUED | OUTPATIENT
Start: 2025-05-13 | End: 2025-05-13

## 2025-05-13 RX ORDER — LIDOCAINE HYDROCHLORIDE AND EPINEPHRINE 10; 10 MG/ML; UG/ML
INJECTION, SOLUTION INFILTRATION; PERINEURAL PRN
Status: DISCONTINUED | OUTPATIENT
Start: 2025-05-13 | End: 2025-05-13 | Stop reason: HOSPADM

## 2025-05-13 RX ORDER — SODIUM CHLORIDE, SODIUM LACTATE, POTASSIUM CHLORIDE, CALCIUM CHLORIDE 600; 310; 30; 20 MG/100ML; MG/100ML; MG/100ML; MG/100ML
INJECTION, SOLUTION INTRAVENOUS CONTINUOUS
Status: DISCONTINUED | OUTPATIENT
Start: 2025-05-13 | End: 2025-05-13 | Stop reason: HOSPADM

## 2025-05-13 RX ORDER — ONDANSETRON 4 MG/1
4 TABLET, ORALLY DISINTEGRATING ORAL EVERY 30 MIN PRN
Status: DISCONTINUED | OUTPATIENT
Start: 2025-05-13 | End: 2025-05-13 | Stop reason: HOSPADM

## 2025-05-13 RX ORDER — HYDROMORPHONE HYDROCHLORIDE 1 MG/ML
0.2 INJECTION, SOLUTION INTRAMUSCULAR; INTRAVENOUS; SUBCUTANEOUS EVERY 5 MIN PRN
Status: DISCONTINUED | OUTPATIENT
Start: 2025-05-13 | End: 2025-05-13 | Stop reason: HOSPADM

## 2025-05-13 RX ORDER — ACETIC ACID 5 %
LIQUID (ML) MISCELLANEOUS PRN
Status: DISCONTINUED | OUTPATIENT
Start: 2025-05-13 | End: 2025-05-13 | Stop reason: HOSPADM

## 2025-05-13 RX ORDER — DEXAMETHASONE SODIUM PHOSPHATE 10 MG/ML
4 INJECTION, SOLUTION INTRAMUSCULAR; INTRAVENOUS
Status: DISCONTINUED | OUTPATIENT
Start: 2025-05-13 | End: 2025-05-13 | Stop reason: HOSPADM

## 2025-05-13 RX ORDER — IBUPROFEN 800 MG/1
800 TABLET, FILM COATED ORAL EVERY 6 HOURS PRN
COMMUNITY
Start: 2025-05-13

## 2025-05-13 RX ORDER — KETOROLAC TROMETHAMINE 30 MG/ML
INJECTION, SOLUTION INTRAMUSCULAR; INTRAVENOUS PRN
Status: DISCONTINUED | OUTPATIENT
Start: 2025-05-13 | End: 2025-05-13

## 2025-05-13 RX ORDER — NALOXONE HYDROCHLORIDE 0.4 MG/ML
0.1 INJECTION, SOLUTION INTRAMUSCULAR; INTRAVENOUS; SUBCUTANEOUS
Status: DISCONTINUED | OUTPATIENT
Start: 2025-05-13 | End: 2025-05-14 | Stop reason: HOSPADM

## 2025-05-13 RX ORDER — FENTANYL CITRATE 50 UG/ML
25 INJECTION, SOLUTION INTRAMUSCULAR; INTRAVENOUS EVERY 5 MIN PRN
Status: DISCONTINUED | OUTPATIENT
Start: 2025-05-13 | End: 2025-05-13 | Stop reason: HOSPADM

## 2025-05-13 RX ORDER — DEXAMETHASONE SODIUM PHOSPHATE 10 MG/ML
4 INJECTION, SOLUTION INTRAMUSCULAR; INTRAVENOUS
Status: DISCONTINUED | OUTPATIENT
Start: 2025-05-13 | End: 2025-05-14 | Stop reason: HOSPADM

## 2025-05-13 RX ORDER — HYDROMORPHONE HYDROCHLORIDE 1 MG/ML
0.4 INJECTION, SOLUTION INTRAMUSCULAR; INTRAVENOUS; SUBCUTANEOUS EVERY 5 MIN PRN
Status: DISCONTINUED | OUTPATIENT
Start: 2025-05-13 | End: 2025-05-13 | Stop reason: HOSPADM

## 2025-05-13 RX ORDER — IODINE AND POTASSIUM IODIDE 50; 100 MG/ML; MG/ML
LIQUID ORAL PRN
Status: DISCONTINUED | OUTPATIENT
Start: 2025-05-13 | End: 2025-05-13 | Stop reason: HOSPADM

## 2025-05-13 RX ORDER — ACETAMINOPHEN 325 MG/1
975 TABLET ORAL EVERY 6 HOURS PRN
COMMUNITY
Start: 2025-05-13

## 2025-05-13 RX ORDER — ONDANSETRON 2 MG/ML
4 INJECTION INTRAMUSCULAR; INTRAVENOUS EVERY 30 MIN PRN
Status: DISCONTINUED | OUTPATIENT
Start: 2025-05-13 | End: 2025-05-14 | Stop reason: HOSPADM

## 2025-05-13 RX ORDER — ONDANSETRON 2 MG/ML
INJECTION INTRAMUSCULAR; INTRAVENOUS PRN
Status: DISCONTINUED | OUTPATIENT
Start: 2025-05-13 | End: 2025-05-13

## 2025-05-13 RX ORDER — FENTANYL CITRATE 50 UG/ML
50 INJECTION, SOLUTION INTRAMUSCULAR; INTRAVENOUS EVERY 5 MIN PRN
Status: DISCONTINUED | OUTPATIENT
Start: 2025-05-13 | End: 2025-05-13 | Stop reason: HOSPADM

## 2025-05-13 RX ORDER — FERRIC SUBSULFATE 0.21 G/G
LIQUID TOPICAL PRN
Status: DISCONTINUED | OUTPATIENT
Start: 2025-05-13 | End: 2025-05-13 | Stop reason: HOSPADM

## 2025-05-13 RX ORDER — ACETAMINOPHEN 325 MG/1
975 TABLET ORAL ONCE
Status: COMPLETED | OUTPATIENT
Start: 2025-05-13 | End: 2025-05-13

## 2025-05-13 RX ORDER — DEXAMETHASONE SODIUM PHOSPHATE 4 MG/ML
INJECTION, SOLUTION INTRA-ARTICULAR; INTRALESIONAL; INTRAMUSCULAR; INTRAVENOUS; SOFT TISSUE PRN
Status: DISCONTINUED | OUTPATIENT
Start: 2025-05-13 | End: 2025-05-13

## 2025-05-13 RX ORDER — LIDOCAINE HYDROCHLORIDE 20 MG/ML
INJECTION, SOLUTION INFILTRATION; PERINEURAL PRN
Status: DISCONTINUED | OUTPATIENT
Start: 2025-05-13 | End: 2025-05-13

## 2025-05-13 RX ORDER — OXYCODONE HYDROCHLORIDE 5 MG/1
10 TABLET ORAL
Status: DISCONTINUED | OUTPATIENT
Start: 2025-05-13 | End: 2025-05-14 | Stop reason: HOSPADM

## 2025-05-13 RX ORDER — OXYCODONE HYDROCHLORIDE 5 MG/1
5 TABLET ORAL
Status: DISCONTINUED | OUTPATIENT
Start: 2025-05-13 | End: 2025-05-14 | Stop reason: HOSPADM

## 2025-05-13 RX ORDER — PROPOFOL 10 MG/ML
INJECTION, EMULSION INTRAVENOUS CONTINUOUS PRN
Status: DISCONTINUED | OUTPATIENT
Start: 2025-05-13 | End: 2025-05-13

## 2025-05-13 RX ORDER — PROPOFOL 10 MG/ML
INJECTION, EMULSION INTRAVENOUS PRN
Status: DISCONTINUED | OUTPATIENT
Start: 2025-05-13 | End: 2025-05-13

## 2025-05-13 RX ADMIN — SODIUM CHLORIDE, SODIUM LACTATE, POTASSIUM CHLORIDE, CALCIUM CHLORIDE: 600; 310; 30; 20 INJECTION, SOLUTION INTRAVENOUS at 07:25

## 2025-05-13 RX ADMIN — PROPOFOL 50 MG: 10 INJECTION, EMULSION INTRAVENOUS at 07:55

## 2025-05-13 RX ADMIN — LIDOCAINE HYDROCHLORIDE 40 MG: 20 INJECTION, SOLUTION INFILTRATION; PERINEURAL at 07:53

## 2025-05-13 RX ADMIN — ACETAMINOPHEN 975 MG: 325 TABLET ORAL at 07:24

## 2025-05-13 RX ADMIN — KETOROLAC TROMETHAMINE 15 MG: 30 INJECTION, SOLUTION INTRAMUSCULAR; INTRAVENOUS at 08:29

## 2025-05-13 RX ADMIN — DEXAMETHASONE SODIUM PHOSPHATE 4 MG: 4 INJECTION, SOLUTION INTRA-ARTICULAR; INTRALESIONAL; INTRAMUSCULAR; INTRAVENOUS; SOFT TISSUE at 08:04

## 2025-05-13 RX ADMIN — FENTANYL CITRATE 50 MCG: 50 INJECTION, SOLUTION INTRAMUSCULAR; INTRAVENOUS at 07:55

## 2025-05-13 RX ADMIN — PROPOFOL 150 MCG/KG/MIN: 10 INJECTION, EMULSION INTRAVENOUS at 07:55

## 2025-05-13 RX ADMIN — ONDANSETRON 4 MG: 2 INJECTION INTRAMUSCULAR; INTRAVENOUS at 07:51

## 2025-05-13 ASSESSMENT — PAIN SCALES - GENERAL: PAINLEVEL_OUTOF10: NO PAIN (0)

## 2025-05-13 NOTE — DISCHARGE INSTRUCTIONS
"[facility]  Home Care Following Anesthesia  For 24 hours after surgery:  Get plenty of rest.  A responsible adult must stay with you for at least 24 hours after you leave the surgery center.  Do not drive or use heavy equipment.  If you have weakness or tingling, don't drive or use heavy equipment until this feeling goes away.   Do not drink alcohol.   Avoid strenuous or risky activities.  Ask for help when climbing stairs.  You may feel lightheaded.  IF so, sit for a few minutes before standing.  Have someone help you get up.   If you have nausea (feel sick to your stomach): Drink only clear liquids such as apple juice, ginger ale, broth or 7-Up.  Rest may also help.  Be sure to drink enough fluids.  Move to a regular diet as you feel able.   You may have a slight fever.  Call the doctor if your fever is over 100 F (37.7 C) (taken under the tongue) or lasts longer than 24 hours.  You may have a dry mouth, a sore throat, muscle aches or trouble sleeping. These should go away after 24 hours.  Do not make important or legal decisions.   It is recommended to avoid smoking.        Today you received a Marcaine or bupivacaine block to numb the nerves near your surgery site.  This is a block using local anesthetic or \"numbing\" medication injected around the nerves to anesthetize or \"numb\" the area supplied by those nerves.  This block is injected into the muscle layer near your surgical site.  The medication may numb the location where you had surgery for 6-18 hours, but may last up to 24 hours.  If your surgical site is an arm or leg you should be careful with your affected limb, since it is possible to injure your limb without being aware of it due to the numbing.  Until full feeling returns, you should guard against bumping or hitting your limb, and avoid extreme hot or cold temperatures on the skin.  As the block wears off, the feeling will return as a tingling or prickly " sensation near your surgical site.  You will experience more discomfort from your incision as the feeling returns.  You may want to take a pain pill (a narcotic or Tylenol if this was prescribed by your surgeon) when you start to experience mild pain before the pain beccomes more severe.  If your pain medications do not control your pain you should notifiy your surgeon.    Tips for taking pain medications  To get the best pain relief possible, remember these points:  Take pain medications as directed, before pain becomes severe.  Pain medication can upset your stomach: taking it with food may help.  Constipation is a common side effect of pain medication. Drink plenty of  fluids.  Eat foods high in fiber. Take a stool softener if recommended by your doctor or pharmacist.  Do not drink alcohol, drive or operate machinery while taking pain medications.  Ask about other ways to control pain, such as with heat, ice or relaxation.    Tylenol/Acetaminophen Consumption    If you feel your pain relief is insufficient, you may take Tylenol/Acetaminophen in addition to your narcotic pain medication.   Be careful not to exceed 4,000 mg of Tylenol/Acetaminophen in a 24 hour period from all sources.  If you are taking extra strength Tylenol/acetaminophen (500 mg), the maximum dose is 8 tablets in 24 hours.  If you are taking regular strength acetaminophen (325 mg), the maximum dose is 12 tablets in 24 hours.    Call a doctor for any of the following:  Signs of infection (fever, growing tenderness at the surgery site, a large amount of drainage or bleeding, severe pain, foul-smelling drainage, redness, swelling).  It has been over 8 to 10 hours since surgery and you are still not able to urinate (pass water).  Headache for over 24 hours.  Signs of Covid-19 infection (temperature over 100 degrees, shortness of breath, cough, loss of taste/smell, generalized body aches, persistent headache, chills, sore throat,  nausea/vomiting/diarrhea)    Your doctor is:  Dr. Yasmin Greene, OB/GYN: [phone]                 After hours and weekends call the hospital @ [phone] and ask for the resident on call for:  OB/GYN  For emergency care, call the:  [place]:  [phone] (TTY for hearing impaired: [phone])

## 2025-05-13 NOTE — ANESTHESIA POSTPROCEDURE EVALUATION
Patient: Philippe Medellin    Procedure: Procedure(s):  CONE BIOPSY, CERVIX, USING LOOP ELECTROSURGICAL EXCISION PROCEDURE (LEEP)       Anesthesia Type:  MAC    Note:  Disposition: Outpatient   Postop Pain Control: Uneventful            Sign Out: Well controlled pain   PONV: No   Neuro/Psych: Uneventful            Sign Out: Acceptable/Baseline neuro status   Airway/Respiratory: Uneventful            Sign Out: Acceptable/Baseline resp. status   CV/Hemodynamics: Uneventful            Sign Out: Acceptable CV status   Other NRE: NONE   DID A NON-ROUTINE EVENT OCCUR? No           Last vitals:  Vitals Value Taken Time   /69 05/13/25 08:55   Temp 36.1  C (97  F) 05/13/25 08:55   Pulse 78 05/13/25 08:55   Resp 16 05/13/25 08:55   SpO2 99 % 05/13/25 08:57   Vitals shown include unfiled device data.    Electronically Signed By: Mark Anthony Casey MD  May 13, 2025  10:27 AM

## 2025-05-13 NOTE — NURSING NOTE
"Oncology Rooming Note    May 13, 2025 11:18 AM   Philippe Medellin is a 54 year old female who presents for:    Chief Complaint   Patient presents with    Blood Draw     Labs drawn via PIV    Oncology Clinic Visit     Malignant neoplasm of upper-outer quadrant of left breast in female, estrogen receptor positive      Initial Vitals: /69   Pulse 78   Temp 97  F (36.1  C) (Temporal)   Resp 16   Ht 1.638 m (5' 4.49\")   Wt 72.1 kg (159 lb)   LMP 05/06/2025   SpO2 98%   BMI 26.88 kg/m   Estimated body mass index is 26.88 kg/m  as calculated from the following:    Height as of this encounter: 1.638 m (5' 4.49\").    Weight as of this encounter: 72.1 kg (159 lb). Body surface area is 1.81 meters squared.  No Pain (0) Comment: Data Unavailable   Patient's last menstrual period was 05/06/2025.  Allergies reviewed: Yes  Medications reviewed: Yes    Medications: Medication refills not needed today.  Pharmacy name entered into EPIC:    [facility] [place], [place] - [address] [address] [place] [address] - [address] [place] [address] [id number]    Frailty Screening:   Is the patient here for a new oncology consult visit in cancer care? 2. No    PHQ9:  Did this patient require a PHQ9?: No      Clinical concerns: Pt would like to know if they should continue taking Tamoxifen after they have a hysterectomy. Pt would like to also know the rate of increased chances for ovarian cancer with the use of tamoxifen.       Soledad Osorio              "

## 2025-05-13 NOTE — PROGRESS NOTES
Pt had lab appointment after surgery on the 2nd floor. Called to verify w/ 2nd floor lab that it was ok to send her down to draw the labs off and they can remove IV after completed. They confirmed that they would. MD notified and agreed to plan.

## 2025-05-13 NOTE — ANESTHESIA PREPROCEDURE EVALUATION
Anesthesia Pre-Procedure Evaluation    Patient: Philippe Medellin   MRN: [id number] : 1970          Procedure : Procedure(s):  CONE BIOPSY, CERVIX, USING LOOP ELECTROSURGICAL EXCISION PROCEDURE (LEEP)         Past Medical History:   Diagnosis Date    ADD (attention deficit disorder)     diagnosed      Allergies     had allergy testing as a child    Asthma     Breast cancer (H) 2023    HSIL on Pap smear of cervix     Intramural and submucous leiomyoma of uterus     Migraine     NF1     PONV (postoperative nausea and vomiting)     Seizures (H)     one seizure in 7th grade after fall, no recurrence      Past Surgical History:   Procedure Laterality Date    BREAST SURGERY Bilateral 2023    Breast reconstruction on the lt and Bilateral reduction    DENTAL SURGERY      INSERT INTRAUTERINE DEVICE      LUMPECTOMY BREAST, SEED LOCALIZATION, SENTINEL NODE Left 02/15/2023    Procedure: LUMPECTOMY, BREAST, WITH RADIOACTIVE SEED LOCALIZATION AND SENTINEL LYMPH NODE BIOPSY;  Surgeon: Noman Diggs MD;  Location: [facility] OR    MA LAP IUD REMOVAL        Allergies   Allergen Reactions    Bee Pollen Anaphylaxis     Other reaction(s): anaphylaxis  BEE Venom      Dog Epithelium (Canis Lupus Familiaris) Difficulty breathing and Shortness Of Breath     Other reaction(s): difficulty breathing    Walnuts [Nuts] Hives    Doxycycline Rash    Sulfa Antibiotics Rash    Sulfamethoxazole-Trimethoprim      Other reaction(s): Unknown    Trimethoprim Rash    Wasp Venom      Other reaction(s): Unknown      Social History     Tobacco Use    Smoking status: Never    Smokeless tobacco: Never    Tobacco comments:     no smokers in the household   Substance Use Topics    Alcohol use: Not Currently      Wt Readings from Last 1 Encounters:   25 72.1 kg (159 lb)        Anesthesia Evaluation            ROS/MED HX  ENT/Pulmonary:  - neg pulmonary ROS   (+)                      asthma                  Neurologic:  - neg neurologic  "ROS     Cardiovascular:  - neg cardiovascular ROS     METS/Exercise Tolerance:     Hematologic:  - neg hematologic  ROS     Musculoskeletal:  - neg musculoskeletal ROS     GI/Hepatic:  - neg GI/hepatic ROS     Renal/Genitourinary:  - neg Renal ROS     Endo:  - neg endo ROS     Psychiatric/Substance Use:  - neg psychiatric ROS     Infectious Disease:  - neg infectious disease ROS     Malignancy:  - neg malignancy ROS (+) Malignancy, History of Breast.Breast CA Remission status post.      Other:  - neg other ROS            Physical Exam  Airway  Mallampati: II  TM distance: >3 FB  Neck ROM: full  Mouth opening: >= 4 cm    Cardiovascular   Rhythm: regular  Rate: normal rate     Dental   (+) Completely normal teeth      Pulmonary Breath sounds clear to auscultation        Neurological   She appears awake, alert and oriented x3.    Other Findings       OUTSIDE LABS:  CBC:   Lab Results   Component Value Date    WBC 6.1 02/03/2025    WBC 4.8 11/19/2024    HGB 13.1 02/03/2025    HGB 13.0 11/19/2024    HCT 39.9 02/03/2025    HCT 40.8 11/19/2024     02/03/2025     11/19/2024     BMP:   Lab Results   Component Value Date     02/03/2025     11/19/2024    POTASSIUM 3.7 02/03/2025    POTASSIUM 3.8 11/19/2024    CHLORIDE 107 02/03/2025    CHLORIDE 107 11/19/2024    CO2 27 02/03/2025    CO2 26 11/19/2024    BUN 14.8 02/03/2025    BUN 13.1 11/19/2024    CR 0.66 02/03/2025    CR 0.67 11/19/2024    GLC 88 02/03/2025    GLC 87 11/19/2024     COAGS: No results found for: \"PTT\", \"INR\", \"FIBR\"  POC:   Lab Results   Component Value Date    HCG Negative 04/03/2009     HEPATIC:   Lab Results   Component Value Date    ALBUMIN 4.0 02/03/2025    PROTTOTAL 7.0 02/03/2025    ALT 14 02/03/2025    AST 20 02/03/2025    ALKPHOS 45 02/03/2025    BILITOTAL <0.2 02/03/2025     OTHER:   Lab Results   Component Value Date    NATHANAEL 8.8 02/03/2025    TSH 2.03 02/10/2023       Anesthesia Plan    ASA Status:  2      NPO Status: NPO " "Appropriate   Anesthesia Type: MAC.   Induction: intravenous.        Consents    Anesthesia Plan(s) and associated risks, benefits, and realistic alternatives discussed. Questions answered and patient/representative(s) expressed understanding.     - Discussed:     - Discussed with:  Patient       Blood Consent:      - Discussed with: not discussed.     Postoperative Care            Comments:                   Mark Anthony Casey MD    I have reviewed the pertinent notes and labs in the chart from the past 30 days and (re)examined the patient.  Any updates or changes from those notes are reflected in this note.    Clinically Significant Risk Factors Present on Admission                             # Overweight: Estimated body mass index is 26.88 kg/m  as calculated from the following:    Height as of this encounter: 1.638 m (5' 4.49\").    Weight as of this encounter: 72.1 kg (159 lb).                    "

## 2025-05-13 NOTE — ANESTHESIA CARE TRANSFER NOTE
Patient: Philippe Medellin    Procedure: Procedure(s):  CONE BIOPSY, CERVIX, USING LOOP ELECTROSURGICAL EXCISION PROCEDURE (LEEP)       Diagnosis: Intramural and submucous leiomyoma of uterus [D25.1, D25.0]  HSIL on Pap smear of cervix [R87.613]  Diagnosis Additional Information: No value filed.    Anesthesia Type:   MAC     Note:    Oropharynx: oropharynx clear of all foreign objects and spontaneously breathing  Level of Consciousness: drowsy  Oxygen Supplementation: room air  Level of Supplemental Oxygen (L/min / FiO2): 0  Independent Airway: airway patency satisfactory and stable  Dentition: dentition unchanged  Vital Signs Stable: post-procedure vital signs reviewed and stable  Report to RN Given: handoff report given  Patient transferred to: Phase II  Comments: Uneventful transport   Report to RN - Artemio Fountain  Pt comfortable  Exchanging well; color natl  Pt responds appropriately to command; sleepy; resting  IV patent  Lips/teeth/dentition as preop status  Questions answered      Handoff Report: Identifed the Patient, Identified the Reponsible Provider, Reviewed the pertinent medical history, Discussed the surgical course, Reviewed Intra-OP anesthesia mangement and issues during anesthesia, Set expectations for post-procedure period and Allowed opportunity for questions and acknowledgement of understanding      Vitals:  Vitals Value Taken Time   /72 05/13/25 08:38   Temp 36.1  C (97  F) 05/13/25 08:38   Pulse 74 05/13/25 08:38   Resp 16 05/13/25 08:38   SpO2 98 % 05/13/25 08:43   Vitals shown include unfiled device data.    Electronically Signed By: JOVANI RIVAS CRNA  May 13, 2025  8:45 AM

## 2025-05-13 NOTE — NURSING NOTE
Chief Complaint   Patient presents with    Blood Draw     Labs drawn via PIV     Labs drawn from PIV Line flushed with saline and dc'd. Pt checked in for appointment(s).     Deedee VELÁSQUEZ RN PHN BSN  BMT/Oncology Lab

## 2025-05-13 NOTE — INTERVAL H&P NOTE
"I have reviewed the surgical (or preoperative) H&P that is linked to this encounter, and examined the patient. There are no significant changes    Clinical Conditions Present on Arrival:  Clinically Significant Risk Factors Present on Admission                           # Overweight: Estimated body mass index is 26.88 kg/m  as calculated from the following:    Height as of this encounter: 1.638 m (5' 4.49\").    Weight as of this encounter: 72.1 kg (159 lb).           "

## 2025-05-13 NOTE — OP NOTE
[facility]  Operative Note    Patient: Philippe Medellin  : 1970  MRN: [id number]    Date of Service: 2025    Pre-operative diagnosis:  1. Pap test HSIL, colposcopy w/ negative biopsies    Post-operative diagnosis:  1. Same as above    Procedure:   1. LEEP    Surgeon: Yasmin Greene MD  Assistants: Jessy Malloy DO, PGY1    Anesthesia: Local with MAC    EBL: 5 mL  Urine: Voided prior to OR  Fluids: 650 crystalloid    Specimens: LEEP specimen with stitch at 12 o'clock, 9 o'clock LEEP specimen with stitch at 9 o'clock, ECC    Complications: none    Findings: Normal appearing cervix, SCJ partially visualized. Aceto-white changes noted at 9 o'clock, 11 o'clock, and 2 o'clock. Lugol's with small non-staining area at 2 o'clock. White loop used to remove LEEP specimen. Specimen not deep enough at 9 o'clock and so additional specimen taken with green loop at 6 o'clock. ECC obtained. Entire LEEP bed and periphery cauterized. Monsel's solution placed over LEEP bed. Excellent hemostasis noted.    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 She had a papon 2025 significant for HSIL, hrHPV negative and a colposcopy with neg biopsies and neg ECC. Per ASCCP guidelines and shared decision making, patient elected for diagnostic LEEP. Discussed risks, benefits, and alternatives to the procedure including risk of infection, bleeding, recurrence. The patient's questions were answered, understanding confirmed, and the patient signed written informed consent.    Technique: The patient was taken to the OR where she was placed in the dorsal lithotomy position with feet in yellow fin stirrups. MAC anesthesia was administered. Patient safety time out was then performed. EUA was performed. The patient was prepped and draped in the usual sterile fashion. A speculum was placed in the vagina and the cervix  visualized. The cervix was bathed with acetic acid for 1 minute and above findings were noted. Lugol's solution was applied to the vagina and cervix, and uptake was noted in the vaginal mucosa. Above findings were noted. 1% lidocaine with epinephrine was injected intracervically at 12, 3, 6, and 9 o'clock. The white loop was used to excise the abnormal portion of the cervix. A small area at 9 o'clock was not deep enough and so an additional pass was made with the green loop. The LEEP bed and entire circumference of the LEEP bed were cauterized thoroughly with good hemostasis. Monsel's solution was placed over the LEEP bed. Endocervical curettage was performed.     I was present and scrubbed for the entirety of the procedure. The patient tolerated the procedure well, was extubated in the OR, and transferred to the PACU in stable condition.    Yasmin Greene MD  [facility], Ob/Gyn  05/13/2025 8:36 AM

## 2025-05-13 NOTE — LETTER
5/13/2025      Philippe Medellin  [address]  [facility] [address]      Dear Colleague,    Thank you for referring your patient, Philippe Medellin, to the [facility]. Please see a copy of my visit note below.    MEDICAL ONCOLOGY FOLLOW UP NOTE     Philippe Tom  Female, 52 year old, 1970  MRN:   [id number]        Dear Dr. Diggs,     Thank you for referring Philippe Tom regarding a newly diagnosed DCIS with a microinvasive component.     PROBLEM: Diagnosis of left sided DCIS with invasive component     HISTORY OF PRESENT ILLNESS: Ms. Tom is a 54 year old woman from [place], [place] with a recent diagnosis of left breast Nuclear Grade 3 ER+(15-20%) DCIS with less than 1 mm of invasive disease as part of workup following routine screening mammogram that detected calcifications. She has not undergone surgical management as of yet, and is establishing care at the [facility] with Medical Oncology, and will be seeing Dr. Noman Diggs for surgical consultation on 1/27/2023.      Her oncologic diagnosis came to light after routine screening mammogram on 12/27/2022 demonstrated indeterminate grouped versus clustered fine microcalcifcations at the 2 o'clock position of the left breast at mid depth. Of note, she does have a history of previous breast biopsies in the right breast on 12/10/2015 that detected fibroadenomas at the 8 o'clock position, 5 cm from the nipple measuring 11 x 6 x 10 mm, and at the 10 o'clock position, 7 cm from the nipple measuring 8 x 4 x 7 mm. Within the 8 o'clock position, rare calcifications were noted adjacent to the fibroadenoma. She also had a diagnostic mammogram for a palpable lump in the left breast on 5/21/2019 at the 2 o'clock position. Focused left breast ultrasound demonstrated an anechoic cyst at the 2 o'clock psition, 8 cm from the nipple measuring 1.7 x 1.2 x 1.5 cm, noted to be benign. An additional anechoic  cyst was noted at 1:30 position, 7 cm from the nipple measurign up to 8 mm and routine yearly mammography was recommended.     She underwent stereotactic guided biopsy at [facility] on 1/10/2023 with pathology (Specimen -[id number]) demonstrating Nuclear Grade 3 ER+ (11-20%, weak) DCIS with central comedonecrosis, with less than 1 mm of invasive disease noted within the specimen.      She was seen by Dr. Kelly Garcia for surgical consultation on 1/19/2023 who discussed surgical options with the patient. Note unavailable in care everywhere    She saw Medical Genetics at [facility] on 1/25/2023, seeing S[facility]      SUMMARY OF OUTSIDE STUDIES:  Patient had bilateral screening mammograms on 12/27/22 and indeterminate grouped versus clustered fine microcalcifications at the 2:00 position of the left breast, middle depth. Recommend correlation with a mediolateral view and spot magnification CC and MLO views of the left breast for further evaluation. Diagnostic imaging followed on 12/30 and clustered microcalcifications in the upper outer aspect of the left breast are suspicious of malignancy. Recommend stereotactically guided biopsy.      1/10/23 - Left breast, stereotactic needle core biopsy at 2:00:  Biopsy at [facility] - pathology report needed.   Left breast, needle core biopsy at 2:00: Ductal carcinoma in situ with   calcifications and at least microinvasion.    Patient states this was left invasive ductal carcinoma,  ER+    <1mm of invasive -- micro  1/19/23 - She did meet with Karley Garcia - consult note needed  Genetic counseling consult scheduled for 1/25 at [facility] - consult note needed.        Breast Cancer Risk Factors:   Germline c.1586T>C([id number]). This mutation is listed in CLINVAR as likely pathogenic as of 5-13-25.     Breast biopsies: 2 breast biopsies on the right breast that  demonstrated fibroadenomas  OCPs: duration of 10 years in college  Menarche: around 7th grade, estimated at 15 years old  Gestational history: 3 pregnancies, 2 live births at 30 years and 32 years, 1  at 20 years  Currently using Mirena IUD for significant bleeding from fibroids. Estimated LMP is 2023  No first degree relatives with breast or ovarian cancer.  She does have a family history of a maternal great aunt and maternal first cousin with breast cancer.     Menstrual history: Menarche age 15.  First pregnancy age 20.  Ended in .  Pregnancies age 30 and 32 with live births.  She has fibroids has a Mirena IUD with light periods.  She did have a history of heavy menstrual bleeding related to the fibroids, before the Mirena IUD was placed.  No history of hormone replacement therapy.         PMH:   Attention deficit disorder  Allergies  Migraines  Fibroids  The patient does not have a history of low bone density, diabetes mellitus, hyperlipidemia, or hypertension     She has no history of breast surgery, breast cancer in the past, radiation therapy in the past, radiation exposure in the past, tumor of any kind, heart problems, heart attack, breathing problems, blood clots, seizures, arthritis, peptic ulcer disease, osteoporosis, bone fractures.  She is not currently participating in a clinical trial.     PSH:  Intrauterine device insertion     ALLERGY:  Bee pollen  Walnuts  Doxycycline  Sulfa  Wasps       Dog hair  - She follows with Dr. Rufus Cardenas at [facility]     FAMILY HISTORY:  Mother with skin cancer on the leg at 65 years  Father with prostate cancer at 62 years old, currently alive, no recurrence [id number]. Lilia 3+4.  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  She has two adult children, 20 and 22 years old  Occasional alcohol use  Never smoker  Does not use any other illicits     ECOG PERFORMANCE STATUS: 0     HISTORY OF RADIATION: No  IMPLANTED CARDIAC DEVICE: No  PREGNANCY RISK: She does currently still have regular menstrual periods, but is using a Mirena IUD and has light periods. The last noted menstrual cycle completed on January 22, 2023.      GENETICS:  NF1 c.1586T>C ([id number]) possibly mosaic Uncertain Significance.  Please note that this genetics result is from peripheral blood and is germline.  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  Discussed with Dr. Fortino Nuñez.     She had [facility] testing for 57 breast cancer risk genes that included BRCA1, BRCA2, NF1, and others.  The testing was negative for all 57 genes but a variant of unknown certain significance was found for NF1.     PATHOLOGY:  A. LEFT BREAST, SEED-LOCALIZED LUMPECTOMY:  - INVASIVE DUCTAL CARCINOMA, Grover grade 2, measuring 2.5 mm in greatest extent.  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  - Margins are negative for invasive carcinoma; closest uninvolved anterior margin is 6 mm.  - Margins are negative for DCIS; closest uninvolved inferior and medial margins are 1.5 mm.  - AJCC pathologic staging is pT1a pN0(sn).  - Biopsy marker and biopsy site changes present.  - Fibrocystic change and columnar cell change with associated calcifications.  - Invasive carcinoma is ER (60%, weak) and SD (30%, moderate)  positive, HER2 IHC is pending and the result will be reported in an addendum.     B. LEFT BREAST, NEW ANTERIOR MARGIN, EXCISION:  - Benign breast tissue with fibrocystic change, sclerosis adenosis and associated calcifications.  - Negative for atypia or malignancy.     C. LEFT BREAST, NEW LATERAL MARGIN, EXCISION:  - Benign breast tissue with fibrocystic change.  - Negative for atypia or malignancy.     D. LEFT BREAST, NEW MEDIAL MARGIN, EXCISION:  - Benign breast tissue with fibrocystic change, columnar cell change and associated calcifications.  - Negative for atypia or malignancy.     E. SENTINEL LYMPH NODE, LEFT AXILLA, BIOPSY #1:  - One lymph node, negative for malignancy (0/1).     F. SENTINEL LYMPH NODE, LEFT AXILLA, BIOPSY #2:  - One lymph node, negative for malignancy (0/1).     TREATMENT HISTORY:  A.  Diagnosis with [id number] invasive ductal cancer of the left breast.  - Invasive carcinoma is ER (60%, weak) and OR (30%, moderate) positive,  HER2 negative 1+  B.  Mirana IUD removed.  C.  Lumpectomy WOODY quadrant.   D.  Bilateral mammoplasty breast reduction.  Follow up with Dr. Arnold.  E.  Radiation  Treatment Summary:  Radiation Oncology - Course: 1         Protocol:   Treatment Site            Current Dose   Modality           From    To        Elapsed Days  Fx.  1 Left Breast     4,240 cGy       06 X                  4/03/2023        4/28/2023        25       16                                                Dose per Fraction: 265 cGy   Total Dose:      4240 cGy  F.  Hormonal therapy with tamoxifen 5 mg per day begun 5-2-23.  She does not want regular dosing of 20 mg per day.  There are issues with fibroids and heavy periods. She did have some difficulty tolerating 5 mg per day.   G.    A. Breast, left, reduction mammoplasty:  --Benign breast tissue with nonproliferative fibrocystic change, 249.7 grams.  --Negative for carcinoma or atypical breast proliferative disease.  B. Breast, right, reduction  "mammoplasty:  --Benign breast tissue with fibrocystic change including usual ductal hyperplasia, 299.2 grams.  --Negative for carcinoma or atypical breast proliferative disease.         INTERVAL HISTORY:    Philippe  affirmed that she prefers tamoxifen 5 mg daily because of concerns about side effects from this medication and we will stay at 5 mg/day.  She continues to do well with minimal side effects.  She was seen today with her  Esau Medellin.    She continues to have periods and is premenopausal at this time.  She has talked with Dr. Lion about possible oophorectomy.  She does have some discomfort in her hands with some stiffness related to the tamoxifen.   She denies fatigue, depression, anxiety.     She did have a LEEP procedure this morning.  She did discuss with Dr. Lion the possibility of CLEM/BSO.    Her breast MRI performed 5/9/2025 was BI-RADS 2.    She does have significant migraines and had 10 migraines in the last month.     REVIEW OF SYSTEMS: A 10 point review of systems was negative.     PHYSICAL EXAM:  VITALS:  /69   Pulse 78   Temp 97  F (36.1  C) (Temporal)   Resp 16   Ht 1.638 m (5' 4.49\")   Wt 72.1 kg (159 lb)   LMP 05/06/2025   SpO2 98%   BMI 26.88 kg/m    General: She appears generally well.  Oropharynx: Good dentition no lesions lymph:  No cervical supraclavicular subclavicular or axillary lymphadenopathy  Breast exam: She has no breast complaints and breast exam was deferred at her request.  Lungs: Clear to percussion auscultation  Heart: Regular rate and rhythm S1-S2.  Abdomen: Soft nontender without hepatosplenomegaly.  Extremities: Without edema.  Mood and affect were normal.     LABS: CBC and CMP were within normal limits except for calcium of 8.3 and a glucose of 105.    IMAGING:  Breast MRI May 7, 2025.    Breast composition: Almost entirely fat     Background parenchymal enhancement 1st post C image: Mild     Findings: There are changes of breast conservation " therapy on the  left. No suspicious finding is identified in either breast. There is  no lymphadenopathy.                                                                   IMPRESSION: BI-RADS CATEGORY: 2 - Benign.     RECOMMENDED FOLLOW-UP: Continued age and risk based screening     JOSELINE CONROY MD         PATHOLOGY:  Cervical biopsy: High-grade squamous intraepithelial lesion (HSIL) encompassing mod/severe dysplasia, CIS, CIN2, CIN3   .        ASSESSMENT AND PLAN:   Philippe Tom is a 54 year old pre-menopausal woman with a recent diagnosis of a left breast  nuclear grade 3 DCIS with a 2.5 mm invasive component.  [id number] ER+ HER2-.  ECOG 0.   She underwent left upper outer quadrant lumpectomy and then had bilateral breast reduction. Philippe is seen today approximately 4 months after the completion of radiation.  There are pigmentation changes and skin thickening of the left breast. Radiation to left breast.  Completed April 28.    Screening adjuvant hormonal therapy. T1a invasive compoinent.  She has tolerated tamoxifen 5 mg/day with some hot flashes and disruption of sleep but otherwise has been tolerating it reasonably well.  Although 20 mg of tamoxifen is sweats recommended she wants to stay with 5 mg/day because its better tolerated.  The plan is to continue tamoxifen for total of 10 years.  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  This mutation is listed in CLINVAR as likely pathogenic as of 5-13-25.  This being said she has hide breast density and has other features that supply intensive surveillance.  She had concerns about the germline NF1 mutation affecting the peripheral blood.  Notably she is BRCA1  and 2 mutation negative.  She wanted to know whether the NF1 mutation is germline or mosaic and Dr. Fortino Nuñez thinks that the mutation is germline.    Intensive surveillance because  of the pathogenic NF1 mutation.  I discussed that we discussed that it makes sense to do intensive surveillance for breast cancer with mammography and MRI each performed yearly and spaced 6 months apart.  Her MRI this   LEEP procedure today for high grade cervical neoplasia.  She had concerns about the germline  Fibroids.  She has less vaginal bleeding and none between periods.  This problem appears to be resolving.  Colonoscopy was performed at [facility].  I asked her to forward the colonoscopy report to us.  Consideration of hysterectomy and oophorectomy.  With fibroids hysterectomy would be reasonable.  As far as oophorectomy is concerned is not clear that NF1 is a risk factor for ovarian cancer.  In terms of breast cancer control 1 could certainly undergo oophorectomy and then begin aromatase inhibitor.  If she has an oophorectomy she could take an aromatase inhibitor which is more effective than tamoxifen but this could be considered an extensive hormonal maneuver where the tamoxifen alone may suffice.  The 20-year risk of distant recurrence with 5 years of tamoxifen is about 13% from the [place] overview.  Tamoxifen could reduce the risk of recurrence by about one third and aromatase inhibitor by about a half.  Recommendations of exercise of 150 minutes/week based on the lace and pathway study.  Recommendation of a Mediterranean style diet low in saturated fat but not low in fat with more fruits and vegetables and less red meat.   Recommendation for bone health includes vitamin D3 2000 international units daily and calcium 1 g/day either by diet or reading labels.  We also recommend bone strengthening exercises including stretch band hand weights and yoga.  Migraines, associated with menses.    Colonoscopy at [facility] in April. One polyp  5 years follow up. Sister had 3 polyps.   1 study indicates unfavorable features in NF1 associated breast cancer.  This could make an argument for a total of 10  years of adjuvant hormonal therapy.  Anneliese E, Marla RA, Tessa S, Edgardo M, Tremaine J, Annette P, Hung P, Angelica R, Yvonne O, Rosanna M, Deniz S, Deniz J. 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 Br J Cancer. 2017 Jan 17;116(2):211-217.  If Philippe notices any new lumps or bumps, she is encouraged to contact the clinic.  Scheduled for LEEP as part of work up for HSIL Pap smear with negative cervix biopsy.  She is now considering moving forward to total hysterectomy and BSO for genetic testing showing NF1 variant with underlying DUB, recent endometrial biopsy was negative.  She will follow up after LEEP to further discuss indication and plan for total hysterectomy.  She also notes concern about her risk of uterine and ovarian cancers. Philippe notes a leiomyoma. She has also been having breakthrough bleeding between periods. In a recent pap smear, H cells were revealed in her cervix, but a biopsy afterwards came back negative. Philippe was previously a candidate for a hysterectomy, but decided against the procedure after reading more about it and noting lessened thyroid symptoms. We assured her that her leiomyoma does not increase her risk for uterine and ovarian cancers.   Follow up.  Follow-up with Gillian in August 7.   Return to see me December 10 with CBC, CMP, mammogram.      Thank you for allowing us to participate in this patient's care.     Sincerely,      Marc Judd MD  Professor  [facility]  [phone]        I spent 45 minutes with the patient more than 50% of which was in counseling and coordination of care.             Again, thank you for allowing me to participate in the care of your patient.        Sincerely,        Marc Judd MD    Electronically signed

## 2025-05-14 ENCOUNTER — RESULTS FOLLOW-UP (OUTPATIENT)
Dept: FAMILY MEDICINE | Facility: CLINIC | Age: 55
End: 2025-05-14

## 2025-05-19 ENCOUNTER — RESULTS FOLLOW-UP (OUTPATIENT)
Dept: OBGYN | Facility: CLINIC | Age: 55
End: 2025-05-19

## 2025-05-19 DIAGNOSIS — C50.412 MALIGNANT NEOPLASM OF UPPER-OUTER QUADRANT OF LEFT BREAST IN FEMALE, ESTROGEN RECEPTOR POSITIVE (H): Primary | ICD-10-CM

## 2025-05-19 DIAGNOSIS — Z17.0 MALIGNANT NEOPLASM OF UPPER-OUTER QUADRANT OF LEFT BREAST IN FEMALE, ESTROGEN RECEPTOR POSITIVE (H): Primary | ICD-10-CM

## 2025-06-15 ENCOUNTER — TELEPHONE (OUTPATIENT)
Dept: ONCOLOGY | Facility: CLINIC | Age: 55
End: 2025-06-15
Payer: MEDICAID

## 2025-06-16 ENCOUNTER — TELEPHONE (OUTPATIENT)
Dept: ONCOLOGY | Facility: CLINIC | Age: 55
End: 2025-06-16
Payer: MEDICAID

## 2025-06-16 NOTE — TELEPHONE ENCOUNTER
Philippe provided information on her father's cancer diagnoses.    He had prostate cancer 20 years ago, and now he has high-grade pleomorphic spindle cell sarcoma, most consistent with undifferentiated pleomorphic sarcoma, FNCLCC grade 3.  The tumor was just inferior to his left knee, measured 6 x 3.9 cm.

## 2025-06-16 NOTE — TELEPHONE ENCOUNTER
Medical Opinion forms received via [employer] from patient.      Forms to be completed and put in folder for provider to approve.    Fax #:  [phone]  Case number: [id number]    Christina Jay

## 2025-06-16 NOTE — TELEPHONE ENCOUNTER
Medical Opinion forms filled out and put in providers folder for review and signature.      Christina Jay

## 2025-06-18 DIAGNOSIS — Z17.0 MALIGNANT NEOPLASM OF UPPER-OUTER QUADRANT OF LEFT BREAST IN FEMALE, ESTROGEN RECEPTOR POSITIVE (H): Primary | ICD-10-CM

## 2025-06-18 DIAGNOSIS — C50.412 MALIGNANT NEOPLASM OF UPPER-OUTER QUADRANT OF LEFT BREAST IN FEMALE, ESTROGEN RECEPTOR POSITIVE (H): Primary | ICD-10-CM

## 2025-06-18 DIAGNOSIS — I89.0 LYMPHEDEMA: ICD-10-CM

## 2025-06-19 NOTE — TELEPHONE ENCOUNTER
Medical Opinion paperwork completed, checked for accuracy, signed and faxed to [employer] @ [phone]. A copy was made, sent to scanning and original emailed to [email or web address].    Successful transmission verified in Right Fax.      Aleja Jay

## 2025-07-01 LAB
SCANNED LAB RESULT: ABNORMAL
TEST NAME: ABNORMAL

## 2025-07-17 DIAGNOSIS — N93.8 DUB (DYSFUNCTIONAL UTERINE BLEEDING): Primary | ICD-10-CM

## 2025-07-17 RX ORDER — PHENAZOPYRIDINE HYDROCHLORIDE 100 MG/1
200 TABLET, FILM COATED ORAL ONCE
OUTPATIENT
Start: 2025-07-17 | End: 2025-07-17

## 2025-07-17 RX ORDER — HEPARIN SODIUM 10000 [USP'U]/ML
5000 INJECTION, SOLUTION INTRAVENOUS; SUBCUTANEOUS
OUTPATIENT
Start: 2025-07-17

## 2025-07-17 RX ORDER — METRONIDAZOLE 500 MG/100ML
500 INJECTION, SOLUTION INTRAVENOUS
OUTPATIENT
Start: 2025-07-17

## 2025-07-21 ENCOUNTER — TELEPHONE (OUTPATIENT)
Dept: ONCOLOGY | Facility: CLINIC | Age: 55
End: 2025-07-21
Payer: MEDICAID

## 2025-07-21 NOTE — TELEPHONE ENCOUNTER
Called Philippe regarding scheduling surgery with Dr. Lion.     Unable to leave voicemail as voicemail box is not yet set up.     Tamiko Rodriguez on 7/21/2025 at 9:02 AM

## 2025-07-22 NOTE — TELEPHONE ENCOUNTER
Incoming call to schedule surgery with Dr. Lion    Spoke with: Philippe (patient)    Date of Surgery: 11/14/2025 - patient wanted late October or later    Arrival time Discussed with Patient:  No    Location of surgery: [facility] [facility]     Pre-Op H&P: Patient instructed to schedule with PCP - Dr. Nuñez - within thirty days of surgery.     Post-Op Appts: 12/4 at 12:30 PM    Imaging: n/a    Discussed with patient pre-op RN will call 2-3 days prior to surgery with arrival time and instructions:  Yes     Packet sent out: Yes Given in clinic     Surgical Soap: Received via clinic    Informed patient that they will need an adult  to bring patient home from surgery: Yes  : Unknown     Additional Comments: None      All patients questions were answered and patient was instructed to review surgical packet and call back with any questions or concerns.       Tamiko Rodriguez on 7/22/2025 at 12:42 PM

## 2025-08-11 ENCOUNTER — TELEPHONE (OUTPATIENT)
Dept: PEDIATRIC HEMATOLOGY/ONCOLOGY | Facility: CLINIC | Age: 55
End: 2025-08-11
Payer: MEDICAID

## 2025-08-18 ENCOUNTER — ONCOLOGY VISIT (OUTPATIENT)
Dept: ONCOLOGY | Facility: CLINIC | Age: 55
End: 2025-08-18
Attending: NURSE PRACTITIONER
Payer: MEDICAID

## 2025-08-18 VITALS
DIASTOLIC BLOOD PRESSURE: 86 MMHG | OXYGEN SATURATION: 97 % | TEMPERATURE: 98.9 F | RESPIRATION RATE: 16 BRPM | BODY MASS INDEX: 27.31 KG/M2 | HEART RATE: 97 BPM | HEIGHT: 64 IN | WEIGHT: 160 LBS | SYSTOLIC BLOOD PRESSURE: 135 MMHG

## 2025-08-18 DIAGNOSIS — Z17.0 MALIGNANT NEOPLASM OF UPPER-OUTER QUADRANT OF LEFT BREAST IN FEMALE, ESTROGEN RECEPTOR POSITIVE (H): ICD-10-CM

## 2025-08-18 DIAGNOSIS — E56.9 VITAMIN DEFICIENCY: ICD-10-CM

## 2025-08-18 DIAGNOSIS — C50.412 MALIGNANT NEOPLASM OF UPPER-OUTER QUADRANT OF LEFT BREAST IN FEMALE, ESTROGEN RECEPTOR POSITIVE (H): ICD-10-CM

## 2025-08-18 LAB
ALBUMIN SERPL BCG-MCNC: 3.9 G/DL (ref 3.5–5.2)
ALP SERPL-CCNC: 45 U/L (ref 40–150)
ALT SERPL W P-5'-P-CCNC: 11 U/L (ref 0–50)
ANION GAP SERPL CALCULATED.3IONS-SCNC: 11 MMOL/L (ref 7–15)
AST SERPL W P-5'-P-CCNC: 18 U/L (ref 0–45)
BASOPHILS # BLD AUTO: <0.03 10E3/UL (ref 0–0.2)
BASOPHILS NFR BLD AUTO: 0.4 %
BILIRUB SERPL-MCNC: 0.2 MG/DL
BUN SERPL-MCNC: 12.5 MG/DL (ref 6–20)
CALCIUM SERPL-MCNC: 8.8 MG/DL (ref 8.8–10.4)
CHLORIDE SERPL-SCNC: 105 MMOL/L (ref 98–107)
CREAT SERPL-MCNC: 0.66 MG/DL (ref 0.51–0.95)
EGFRCR SERPLBLD CKD-EPI 2021: >90 ML/MIN/1.73M2
EOSINOPHIL # BLD AUTO: 0.06 10E3/UL (ref 0–0.7)
EOSINOPHIL NFR BLD AUTO: 1.3 %
ERYTHROCYTE [DISTWIDTH] IN BLOOD BY AUTOMATED COUNT: 13.7 % (ref 10–15)
GLUCOSE SERPL-MCNC: 100 MG/DL (ref 70–99)
HCO3 SERPL-SCNC: 26 MMOL/L (ref 22–29)
HCT VFR BLD AUTO: 40.9 % (ref 35–47)
HGB BLD-MCNC: 13.2 G/DL (ref 11.7–15.7)
IMM GRANULOCYTES # BLD: <0.03 10E3/UL
IMM GRANULOCYTES NFR BLD: 0.2 %
LYMPHOCYTES # BLD AUTO: 1.21 10E3/UL (ref 0.8–5.3)
LYMPHOCYTES NFR BLD AUTO: 25.6 %
MCH RBC QN AUTO: 27.4 PG (ref 26.5–33)
MCHC RBC AUTO-ENTMCNC: 32.3 G/DL (ref 31.5–36.5)
MCV RBC AUTO: 85 FL (ref 78–100)
MONOCYTES # BLD AUTO: 0.42 10E3/UL (ref 0–1.3)
MONOCYTES NFR BLD AUTO: 8.9 %
NEUTROPHILS # BLD AUTO: 3.01 10E3/UL (ref 1.6–8.3)
NEUTROPHILS NFR BLD AUTO: 63.6 %
NRBC # BLD AUTO: <0.03 10E3/UL
NRBC BLD AUTO-RTO: 0 /100
PLATELET # BLD AUTO: 231 10E3/UL (ref 150–450)
POTASSIUM SERPL-SCNC: 3.6 MMOL/L (ref 3.4–5.3)
PROT SERPL-MCNC: 7 G/DL (ref 6.4–8.3)
RBC # BLD AUTO: 4.81 10E6/UL (ref 3.8–5.2)
SODIUM SERPL-SCNC: 142 MMOL/L (ref 135–145)
VIT D+METAB SERPL-MCNC: 50 NG/ML (ref 20–50)
WBC # BLD AUTO: 4.73 10E3/UL (ref 4–11)

## 2025-08-18 PROCEDURE — 84155 ASSAY OF PROTEIN SERUM: CPT | Performed by: NURSE PRACTITIONER

## 2025-08-18 PROCEDURE — 36415 COLL VENOUS BLD VENIPUNCTURE: CPT | Performed by: NURSE PRACTITIONER

## 2025-08-18 PROCEDURE — 85025 COMPLETE CBC W/AUTO DIFF WBC: CPT | Performed by: NURSE PRACTITIONER

## 2025-08-18 PROCEDURE — 82306 VITAMIN D 25 HYDROXY: CPT | Performed by: NURSE PRACTITIONER

## 2025-08-18 ASSESSMENT — PAIN SCALES - GENERAL: PAINLEVEL_OUTOF10: NO PAIN (0)

## (undated) DEVICE — GLOVE BIOGEL PI MICRO INDICATOR UNDERGLOVE SZ 6.0 48960

## (undated) DEVICE — TUBING SUCTION 12"X1/4" N612

## (undated) DEVICE — PREP CHLORAPREP 26ML TINTED HI-LITE ORANGE 930815

## (undated) DEVICE — KIT PROCEDURE SPY-PHI SGL HH9001

## (undated) DEVICE — PACK ASC GYN MINOR P15GMUMA

## (undated) DEVICE — CLIP HORIZON MED BLUE 002200

## (undated) DEVICE — ESU GROUND PAD ADULT W/CORD E7507

## (undated) DEVICE — DRSG TELFA 3X8" 1238

## (undated) DEVICE — DRAPE SHEET MED 44X70" 9355

## (undated) DEVICE — Device

## (undated) DEVICE — SOL WATER IRRIG 1000ML BOTTLE 2F7114

## (undated) DEVICE — COVER NEOPROBE SOFTFLEX 5X96" W/BANDS 20-PC596

## (undated) DEVICE — MARKER MARGIN MARKER STD 6 COLOR SGL USE MMS6

## (undated) DEVICE — SOL WATER IRRIG 500ML BOTTLE 2F7113

## (undated) DEVICE — SU VICRYL 3-0 SH 27" J316H

## (undated) DEVICE — ESU PENCIL SMOKE EVAC W/ROCKER SWITCH 0703-047-000

## (undated) DEVICE — SUCTION MANIFOLD NEPTUNE 2 SYS 4 PORT 0702-020-000

## (undated) DEVICE — DECANTER VIAL 2006S

## (undated) DEVICE — SU PDS II 4-0 FS-2 27" Z422H

## (undated) DEVICE — SET BREAST BIOPSY LOCALIZER 20 PROBE 8MM PENCIL 09-0006

## (undated) DEVICE — SYR 10ML FINGER CONTROL W/O NDL 309695

## (undated) DEVICE — ESU ELEC LEEP LOOP 20X12MM WHITE DLP-W11

## (undated) DEVICE — LINEN TOWEL PACK X6 WHITE 5487

## (undated) DEVICE — GLOVE BIOGEL PI MICRO SZ 6.0 48560

## (undated) DEVICE — ESU ELEC LEEP LOOP 15X12MM GREEN DLP-M11

## (undated) DEVICE — SU SILK 2-0 SH 30" K833H

## (undated) DEVICE — CLIP HORIZON SM RED WIDE SLOT 001201

## (undated) DEVICE — LINEN TOWEL PACK X5 5464

## (undated) DEVICE — DRAPE UNDER BUTTOCK 8483

## (undated) DEVICE — SU SILK 3-0 SH 30" K832H

## (undated) DEVICE — DRAPE SHEET REV FOLD 3/4 9349

## (undated) DEVICE — DRAPE U SPLIT 74X120" 29440

## (undated) DEVICE — ESU ELEC BLADE 2.75" COATED/INSULATED E1455

## (undated) DEVICE — DRAPE IOBAN INCISE 23X17" 6650EZ

## (undated) DEVICE — SPECIMEN CONTAINER 60MLW/10% FORMALIN 59601

## (undated) DEVICE — ESU ELEC LEEP BALL 5MM

## (undated) DEVICE — NDL SPINAL 22GA 3.5" QUINCKE 405181

## (undated) DEVICE — SWAB PROCTO 16" 2/PK 32-046

## (undated) RX ORDER — FENTANYL CITRATE 50 UG/ML
INJECTION, SOLUTION INTRAMUSCULAR; INTRAVENOUS
Status: DISPENSED
Start: 2023-02-15

## (undated) RX ORDER — ACETAMINOPHEN 325 MG/1
TABLET ORAL
Status: DISPENSED
Start: 2023-02-15

## (undated) RX ORDER — OXYCODONE HYDROCHLORIDE 5 MG/1
TABLET ORAL
Status: DISPENSED
Start: 2023-02-15

## (undated) RX ORDER — KETOROLAC TROMETHAMINE 30 MG/ML
INJECTION, SOLUTION INTRAMUSCULAR; INTRAVENOUS
Status: DISPENSED
Start: 2023-02-15

## (undated) RX ORDER — DEXAMETHASONE SODIUM PHOSPHATE 4 MG/ML
INJECTION, SOLUTION INTRA-ARTICULAR; INTRALESIONAL; INTRAMUSCULAR; INTRAVENOUS; SOFT TISSUE
Status: DISPENSED
Start: 2025-05-13

## (undated) RX ORDER — ONDANSETRON 2 MG/ML
INJECTION INTRAMUSCULAR; INTRAVENOUS
Status: DISPENSED
Start: 2023-02-15

## (undated) RX ORDER — ACETAMINOPHEN 325 MG/1
TABLET ORAL
Status: DISPENSED
Start: 2025-05-13

## (undated) RX ORDER — CEFAZOLIN SODIUM 1 G/3ML
INJECTION, POWDER, FOR SOLUTION INTRAMUSCULAR; INTRAVENOUS
Status: DISPENSED
Start: 2023-02-15

## (undated) RX ORDER — KETOROLAC TROMETHAMINE 30 MG/ML
INJECTION, SOLUTION INTRAMUSCULAR; INTRAVENOUS
Status: DISPENSED
Start: 2025-05-13

## (undated) RX ORDER — IBUPROFEN 200 MG
TABLET ORAL
Status: DISPENSED
Start: 2025-03-06

## (undated) RX ORDER — ACETIC ACID 5 %
LIQUID (ML) MISCELLANEOUS
Status: DISPENSED
Start: 2025-05-13

## (undated) RX ORDER — DEXAMETHASONE SODIUM PHOSPHATE 4 MG/ML
INJECTION, SOLUTION INTRA-ARTICULAR; INTRALESIONAL; INTRAMUSCULAR; INTRAVENOUS; SOFT TISSUE
Status: DISPENSED
Start: 2023-02-15

## (undated) RX ORDER — LIDOCAINE HYDROCHLORIDE AND EPINEPHRINE 10; 10 MG/ML; UG/ML
INJECTION, SOLUTION INFILTRATION; PERINEURAL
Status: DISPENSED
Start: 2025-05-13

## (undated) RX ORDER — PROPOFOL 10 MG/ML
INJECTION, EMULSION INTRAVENOUS
Status: DISPENSED
Start: 2023-02-15

## (undated) RX ORDER — BUPIVACAINE HYDROCHLORIDE AND EPINEPHRINE 2.5; 5 MG/ML; UG/ML
INJECTION, SOLUTION EPIDURAL; INFILTRATION; INTRACAUDAL; PERINEURAL
Status: DISPENSED
Start: 2023-02-15

## (undated) RX ORDER — LIDOCAINE HYDROCHLORIDE 10 MG/ML
INJECTION, SOLUTION EPIDURAL; INFILTRATION; INTRACAUDAL; PERINEURAL
Status: DISPENSED
Start: 2025-05-13

## (undated) RX ORDER — FERRIC SUBSULFATE 0.21 G/G
LIQUID TOPICAL
Status: DISPENSED
Start: 2025-05-13

## (undated) RX ORDER — IODINE AND POTASSIUM IODIDE 50; 100 MG/ML; MG/ML
LIQUID ORAL
Status: DISPENSED
Start: 2025-05-13

## (undated) RX ORDER — ONDANSETRON 2 MG/ML
INJECTION INTRAMUSCULAR; INTRAVENOUS
Status: DISPENSED
Start: 2025-05-13

## (undated) RX ORDER — FENTANYL CITRATE-0.9 % NACL/PF 10 MCG/ML
PLASTIC BAG, INJECTION (ML) INTRAVENOUS
Status: DISPENSED
Start: 2023-02-15

## (undated) RX ORDER — FENTANYL CITRATE 50 UG/ML
INJECTION, SOLUTION INTRAMUSCULAR; INTRAVENOUS
Status: DISPENSED
Start: 2025-05-13

## (undated) RX ORDER — PROPOFOL 10 MG/ML
INJECTION, EMULSION INTRAVENOUS
Status: DISPENSED
Start: 2025-05-13